# Patient Record
Sex: MALE | Race: WHITE | NOT HISPANIC OR LATINO | Employment: FULL TIME | URBAN - METROPOLITAN AREA
[De-identification: names, ages, dates, MRNs, and addresses within clinical notes are randomized per-mention and may not be internally consistent; named-entity substitution may affect disease eponyms.]

---

## 2018-01-01 ENCOUNTER — APPOINTMENT (OUTPATIENT)
Dept: GENERAL RADIOLOGY | Facility: HOSPITAL | Age: 63
End: 2018-01-01

## 2018-01-01 ENCOUNTER — APPOINTMENT (OUTPATIENT)
Dept: CT IMAGING | Facility: HOSPITAL | Age: 63
End: 2018-01-01

## 2018-01-01 ENCOUNTER — OFFICE VISIT (OUTPATIENT)
Dept: ONCOLOGY | Facility: CLINIC | Age: 63
End: 2018-01-01

## 2018-01-01 ENCOUNTER — APPOINTMENT (OUTPATIENT)
Dept: NUCLEAR MEDICINE | Facility: HOSPITAL | Age: 63
End: 2018-01-01

## 2018-01-01 ENCOUNTER — TELEPHONE (OUTPATIENT)
Dept: ONCOLOGY | Facility: HOSPITAL | Age: 63
End: 2018-01-01

## 2018-01-01 ENCOUNTER — DOCUMENTATION (OUTPATIENT)
Dept: OTHER | Facility: HOSPITAL | Age: 63
End: 2018-01-01

## 2018-01-01 ENCOUNTER — LAB (OUTPATIENT)
Dept: LAB | Facility: HOSPITAL | Age: 63
End: 2018-01-01

## 2018-01-01 ENCOUNTER — TELEPHONE (OUTPATIENT)
Dept: ONCOLOGY | Facility: CLINIC | Age: 63
End: 2018-01-01

## 2018-01-01 ENCOUNTER — HOSPITAL ENCOUNTER (EMERGENCY)
Facility: HOSPITAL | Age: 63
Discharge: HOME | End: 2018-09-21
Attending: EMERGENCY MEDICINE | Admitting: EMERGENCY MEDICINE

## 2018-01-01 ENCOUNTER — APPOINTMENT (OUTPATIENT)
Dept: LAB | Facility: HOSPITAL | Age: 63
End: 2018-01-01

## 2018-01-01 ENCOUNTER — DOCUMENTATION (OUTPATIENT)
Dept: ONCOLOGY | Facility: CLINIC | Age: 63
End: 2018-01-01

## 2018-01-01 ENCOUNTER — DOCUMENTATION (OUTPATIENT)
Dept: RADIATION ONCOLOGY | Facility: HOSPITAL | Age: 63
End: 2018-01-01

## 2018-01-01 ENCOUNTER — NURSE TRIAGE (OUTPATIENT)
Dept: CALL CENTER | Facility: HOSPITAL | Age: 63
End: 2018-01-01

## 2018-01-01 ENCOUNTER — TELEPHONE (OUTPATIENT)
Dept: RADIATION ONCOLOGY | Facility: HOSPITAL | Age: 63
End: 2018-01-01

## 2018-01-01 ENCOUNTER — APPOINTMENT (OUTPATIENT)
Dept: ONCOLOGY | Facility: HOSPITAL | Age: 63
End: 2018-01-01

## 2018-01-01 ENCOUNTER — HOSPITAL ENCOUNTER (EMERGENCY)
Facility: HOSPITAL | Age: 63
Discharge: HOME | End: 2018-09-23
Attending: EMERGENCY MEDICINE | Admitting: EMERGENCY MEDICINE

## 2018-01-01 ENCOUNTER — HOSPITAL ENCOUNTER (INPATIENT)
Facility: HOSPITAL | Age: 63
LOS: 7 days | Discharge: HOSPICE | End: 2018-09-15
Attending: EMERGENCY MEDICINE | Admitting: INTERNAL MEDICINE

## 2018-01-01 ENCOUNTER — TELEPHONE (OUTPATIENT)
Dept: OTHER | Facility: HOSPITAL | Age: 63
End: 2018-01-01

## 2018-01-01 ENCOUNTER — DOCUMENTATION (OUTPATIENT)
Dept: ONCOLOGY | Facility: HOSPITAL | Age: 63
End: 2018-01-01

## 2018-01-01 ENCOUNTER — READMISSION MANAGEMENT (OUTPATIENT)
Dept: CALL CENTER | Facility: HOSPITAL | Age: 63
End: 2018-01-01

## 2018-01-01 ENCOUNTER — APPOINTMENT (OUTPATIENT)
Dept: GENERAL RADIOLOGY | Facility: HOSPITAL | Age: 63
End: 2018-01-01
Attending: RADIOLOGY

## 2018-01-01 ENCOUNTER — RADIATION ONCOLOGY WEEKLY ASSESSMENT (OUTPATIENT)
Dept: RADIATION ONCOLOGY | Facility: HOSPITAL | Age: 63
End: 2018-01-01

## 2018-01-01 ENCOUNTER — APPOINTMENT (OUTPATIENT)
Dept: RESPIRATORY THERAPY | Facility: HOSPITAL | Age: 63
End: 2018-01-01
Attending: INTERNAL MEDICINE

## 2018-01-01 ENCOUNTER — HOSPITAL ENCOUNTER (INPATIENT)
Facility: HOSPITAL | Age: 63
LOS: 4 days | End: 2018-10-03
Attending: EMERGENCY MEDICINE | Admitting: INTERNAL MEDICINE

## 2018-01-01 ENCOUNTER — APPOINTMENT (OUTPATIENT)
Dept: RADIATION ONCOLOGY | Facility: HOSPITAL | Age: 63
End: 2018-01-01

## 2018-01-01 ENCOUNTER — APPOINTMENT (OUTPATIENT)
Dept: ONCOLOGY | Facility: CLINIC | Age: 63
End: 2018-01-01

## 2018-01-01 ENCOUNTER — APPOINTMENT (OUTPATIENT)
Dept: CARDIOLOGY | Facility: HOSPITAL | Age: 63
End: 2018-01-01
Attending: INTERNAL MEDICINE

## 2018-01-01 ENCOUNTER — HOSPITAL ENCOUNTER (INPATIENT)
Facility: HOSPITAL | Age: 63
LOS: 1 days | Discharge: HOME | End: 2018-09-01
Attending: EMERGENCY MEDICINE | Admitting: INTERNAL MEDICINE

## 2018-01-01 ENCOUNTER — APPOINTMENT (OUTPATIENT)
Dept: MRI IMAGING | Facility: HOSPITAL | Age: 63
End: 2018-01-01

## 2018-01-01 VITALS — WEIGHT: 143.96 LBS | BODY MASS INDEX: 23.24 KG/M2

## 2018-01-01 VITALS
DIASTOLIC BLOOD PRESSURE: 70 MMHG | HEIGHT: 66 IN | TEMPERATURE: 98.6 F | BODY MASS INDEX: 21.34 KG/M2 | SYSTOLIC BLOOD PRESSURE: 112 MMHG | WEIGHT: 132.8 LBS | HEART RATE: 106 BPM | OXYGEN SATURATION: 98 % | RESPIRATION RATE: 16 BRPM

## 2018-01-01 VITALS
HEIGHT: 66 IN | WEIGHT: 124 LBS | HEART RATE: 59 BPM | BODY MASS INDEX: 19.93 KG/M2 | TEMPERATURE: 98.7 F | SYSTOLIC BLOOD PRESSURE: 100 MMHG | RESPIRATION RATE: 16 BRPM | DIASTOLIC BLOOD PRESSURE: 60 MMHG | OXYGEN SATURATION: 97 %

## 2018-01-01 VITALS
RESPIRATION RATE: 16 BRPM | DIASTOLIC BLOOD PRESSURE: 60 MMHG | HEART RATE: 101 BPM | BODY MASS INDEX: 21.21 KG/M2 | TEMPERATURE: 97.1 F | HEIGHT: 66 IN | SYSTOLIC BLOOD PRESSURE: 101 MMHG | OXYGEN SATURATION: 97 % | WEIGHT: 132 LBS

## 2018-01-01 VITALS
DIASTOLIC BLOOD PRESSURE: 84 MMHG | WEIGHT: 135 LBS | SYSTOLIC BLOOD PRESSURE: 105 MMHG | TEMPERATURE: 97.4 F | HEIGHT: 67 IN | BODY MASS INDEX: 21.19 KG/M2 | RESPIRATION RATE: 18 BRPM | OXYGEN SATURATION: 97 % | HEART RATE: 84 BPM

## 2018-01-01 VITALS
DIASTOLIC BLOOD PRESSURE: 79 MMHG | HEART RATE: 100 BPM | SYSTOLIC BLOOD PRESSURE: 118 MMHG | TEMPERATURE: 98.6 F | RESPIRATION RATE: 16 BRPM | WEIGHT: 123.4 LBS | HEIGHT: 63 IN | OXYGEN SATURATION: 93 % | BODY MASS INDEX: 21.86 KG/M2

## 2018-01-01 VITALS
WEIGHT: 143.96 LBS | HEIGHT: 66 IN | TEMPERATURE: 97.8 F | SYSTOLIC BLOOD PRESSURE: 139 MMHG | BODY MASS INDEX: 23.14 KG/M2 | RESPIRATION RATE: 18 BRPM | DIASTOLIC BLOOD PRESSURE: 97 MMHG | HEART RATE: 71 BPM | OXYGEN SATURATION: 97 %

## 2018-01-01 VITALS
RESPIRATION RATE: 16 BRPM | HEART RATE: 71 BPM | TEMPERATURE: 100 F | DIASTOLIC BLOOD PRESSURE: 67 MMHG | HEIGHT: 66 IN | SYSTOLIC BLOOD PRESSURE: 107 MMHG | BODY MASS INDEX: 22.31 KG/M2 | OXYGEN SATURATION: 95 % | WEIGHT: 138.8 LBS

## 2018-01-01 DIAGNOSIS — C79.31 SECONDARY CANCER OF BRAIN (HCC): ICD-10-CM

## 2018-01-01 DIAGNOSIS — M25.552 LEFT HIP PAIN: Primary | ICD-10-CM

## 2018-01-01 DIAGNOSIS — I48.3 TYPICAL ATRIAL FLUTTER (HCC): Primary | ICD-10-CM

## 2018-01-01 DIAGNOSIS — C79.51 METASTASIS TO BONE (HCC): ICD-10-CM

## 2018-01-01 DIAGNOSIS — R91.1 LUNG NODULE: Primary | ICD-10-CM

## 2018-01-01 DIAGNOSIS — J95.811 POSTPROCEDURAL PNEUMOTHORAX: ICD-10-CM

## 2018-01-01 DIAGNOSIS — C34.91 NON-SMALL CELL CANCER OF RIGHT LUNG (HCC): Primary | ICD-10-CM

## 2018-01-01 DIAGNOSIS — R31.9 HEMATURIA, UNSPECIFIED TYPE: Primary | ICD-10-CM

## 2018-01-01 DIAGNOSIS — C79.51 LUNG CANCER METASTATIC TO BONE (HCC): Primary | ICD-10-CM

## 2018-01-01 DIAGNOSIS — C79.51 METASTASIS TO BONE (HCC): Primary | ICD-10-CM

## 2018-01-01 DIAGNOSIS — S22.32XA CLOSED FRACTURE OF ONE RIB OF LEFT SIDE, INITIAL ENCOUNTER: ICD-10-CM

## 2018-01-01 DIAGNOSIS — C34.91 NON-SMALL CELL CANCER OF RIGHT LUNG (HCC): ICD-10-CM

## 2018-01-01 DIAGNOSIS — K59.03 DRUG-INDUCED CONSTIPATION: Primary | ICD-10-CM

## 2018-01-01 DIAGNOSIS — J93.9 PNEUMOTHORAX ON RIGHT: Primary | ICD-10-CM

## 2018-01-01 DIAGNOSIS — C34.90 LUNG CANCER METASTATIC TO BONE (HCC): Primary | ICD-10-CM

## 2018-01-01 DIAGNOSIS — I95.9 HYPOTENSION, UNSPECIFIED HYPOTENSION TYPE: ICD-10-CM

## 2018-01-01 DIAGNOSIS — I48.91 ATRIAL FIBRILLATION WITH RVR (HCC): ICD-10-CM

## 2018-01-01 DIAGNOSIS — R10.84 GENERALIZED ABDOMINAL PAIN: Primary | ICD-10-CM

## 2018-01-01 DIAGNOSIS — S22.39XA CLOSED FRACTURE OF ONE RIB, UNSPECIFIED LATERALITY, INITIAL ENCOUNTER: ICD-10-CM

## 2018-01-01 DIAGNOSIS — R93.5 ABNORMAL CT OF THE ABDOMEN: ICD-10-CM

## 2018-01-01 DIAGNOSIS — R91.1 LUNG NODULE: ICD-10-CM

## 2018-01-01 DIAGNOSIS — R91.8 LUNG MASS: ICD-10-CM

## 2018-01-01 LAB
ABO GROUP BLD: NORMAL
ALBUMIN SERPL-MCNC: 2.5 G/DL (ref 3.5–5.2)
ALBUMIN SERPL-MCNC: 2.9 G/DL (ref 3.5–5.2)
ALBUMIN SERPL-MCNC: 3.5 G/DL (ref 3.5–5.2)
ALBUMIN SERPL-MCNC: 3.6 G/DL (ref 3.5–5.2)
ALBUMIN SERPL-MCNC: 4 G/DL (ref 3.5–5.2)
ALBUMIN SERPL-MCNC: 4.2 G/DL (ref 3.5–5.2)
ALBUMIN/GLOB SERPL: 1 G/DL
ALBUMIN/GLOB SERPL: 1 G/DL
ALBUMIN/GLOB SERPL: 1 G/DL (ref 1.1–2.4)
ALBUMIN/GLOB SERPL: 1.1 G/DL
ALP SERPL-CCNC: 106 U/L (ref 39–117)
ALP SERPL-CCNC: 107 U/L (ref 38–116)
ALP SERPL-CCNC: 112 U/L (ref 39–117)
ALP SERPL-CCNC: 113 U/L (ref 39–117)
ALP SERPL-CCNC: 116 U/L (ref 39–117)
ALP SERPL-CCNC: 72 U/L (ref 39–117)
ALT SERPL W P-5'-P-CCNC: 14 U/L (ref 1–41)
ALT SERPL W P-5'-P-CCNC: 19 U/L (ref 1–41)
ALT SERPL W P-5'-P-CCNC: 24 U/L (ref 1–41)
ALT SERPL W P-5'-P-CCNC: 25 U/L (ref 0–41)
ALT SERPL W P-5'-P-CCNC: 25 U/L (ref 1–41)
ALT SERPL W P-5'-P-CCNC: 8 U/L (ref 1–41)
AMPHET+METHAMPHET UR QL: NEGATIVE
ANION GAP SERPL CALCULATED.3IONS-SCNC: 11.5 MMOL/L
ANION GAP SERPL CALCULATED.3IONS-SCNC: 11.9 MMOL/L
ANION GAP SERPL CALCULATED.3IONS-SCNC: 12.1 MMOL/L
ANION GAP SERPL CALCULATED.3IONS-SCNC: 12.4 MMOL/L
ANION GAP SERPL CALCULATED.3IONS-SCNC: 12.4 MMOL/L
ANION GAP SERPL CALCULATED.3IONS-SCNC: 13.2 MMOL/L
ANION GAP SERPL CALCULATED.3IONS-SCNC: 13.4 MMOL/L
ANION GAP SERPL CALCULATED.3IONS-SCNC: 13.6 MMOL/L
ANION GAP SERPL CALCULATED.3IONS-SCNC: 14.4 MMOL/L
ANION GAP SERPL CALCULATED.3IONS-SCNC: 14.9 MMOL/L
ANION GAP SERPL CALCULATED.3IONS-SCNC: 15.2 MMOL/L
ANION GAP SERPL CALCULATED.3IONS-SCNC: 15.8 MMOL/L
ANION GAP SERPL CALCULATED.3IONS-SCNC: 16.3 MMOL/L
ANION GAP SERPL CALCULATED.3IONS-SCNC: 16.5 MMOL/L
ANION GAP SERPL CALCULATED.3IONS-SCNC: 16.9 MMOL/L
ANION GAP SERPL CALCULATED.3IONS-SCNC: 9.3 MMOL/L
APTT PPP: 24.9 SECONDS (ref 22.7–35.4)
APTT PPP: 31.1 SECONDS (ref 22.7–35.4)
APTT PPP: 33.5 SECONDS (ref 22.7–35.4)
AST SERPL-CCNC: 13 U/L (ref 1–40)
AST SERPL-CCNC: 14 U/L (ref 1–40)
AST SERPL-CCNC: 15 U/L (ref 0–40)
AST SERPL-CCNC: 17 U/L (ref 1–40)
AST SERPL-CCNC: 18 U/L (ref 1–40)
AST SERPL-CCNC: 20 U/L (ref 1–40)
BACTERIA UR QL AUTO: ABNORMAL /HPF
BACTERIA UR QL AUTO: ABNORMAL /HPF
BARBITURATES UR QL SCN: NEGATIVE
BASOPHILS # BLD AUTO: 0 10*3/MM3 (ref 0–0.2)
BASOPHILS # BLD AUTO: 0.01 10*3/MM3 (ref 0–0.1)
BASOPHILS # BLD AUTO: 0.01 10*3/MM3 (ref 0–0.2)
BASOPHILS # BLD AUTO: 0.02 10*3/MM3 (ref 0–0.2)
BASOPHILS # BLD AUTO: 0.03 10*3/MM3 (ref 0–0.2)
BASOPHILS # BLD AUTO: 0.03 10*3/MM3 (ref 0–0.2)
BASOPHILS # BLD AUTO: 0.09 10*3/MM3 (ref 0–0.1)
BASOPHILS NFR BLD AUTO: 0 % (ref 0–1.1)
BASOPHILS NFR BLD AUTO: 0 % (ref 0–1.5)
BASOPHILS NFR BLD AUTO: 0.1 % (ref 0–1.5)
BASOPHILS NFR BLD AUTO: 0.2 % (ref 0–1.5)
BASOPHILS NFR BLD AUTO: 0.3 % (ref 0–1.1)
BENZODIAZ UR QL SCN: NEGATIVE
BH CV ECHO MEAS - ACS: 2.4 CM
BH CV ECHO MEAS - AO MAX PG (FULL): 1.1 MMHG
BH CV ECHO MEAS - AO MAX PG: 3.6 MMHG
BH CV ECHO MEAS - AO MEAN PG (FULL): 1 MMHG
BH CV ECHO MEAS - AO MEAN PG: 2 MMHG
BH CV ECHO MEAS - AO ROOT AREA (BSA CORRECTED): 2
BH CV ECHO MEAS - AO ROOT AREA: 9.1 CM^2
BH CV ECHO MEAS - AO ROOT DIAM: 3.4 CM
BH CV ECHO MEAS - AO V2 MAX: 94.7 CM/SEC
BH CV ECHO MEAS - AO V2 MEAN: 61.7 CM/SEC
BH CV ECHO MEAS - AO V2 VTI: 16.1 CM
BH CV ECHO MEAS - AVA(I,A): 2.5 CM^2
BH CV ECHO MEAS - AVA(I,D): 2.5 CM^2
BH CV ECHO MEAS - AVA(V,A): 2.6 CM^2
BH CV ECHO MEAS - AVA(V,D): 2.6 CM^2
BH CV ECHO MEAS - BSA(HAYCOCK): 1.7 M^2
BH CV ECHO MEAS - BSA: 1.7 M^2
BH CV ECHO MEAS - BZI_BMI: 23.1 KILOGRAMS/M^2
BH CV ECHO MEAS - BZI_METRIC_HEIGHT: 167.6 CM
BH CV ECHO MEAS - BZI_METRIC_WEIGHT: 64.9 KG
BH CV ECHO MEAS - EDV(CUBED): 117.6 ML
BH CV ECHO MEAS - EDV(MOD-SP2): 59 ML
BH CV ECHO MEAS - EDV(MOD-SP4): 62 ML
BH CV ECHO MEAS - EDV(TEICH): 112.8 ML
BH CV ECHO MEAS - EF(CUBED): 63.6 %
BH CV ECHO MEAS - EF(MOD-BP): 64 %
BH CV ECHO MEAS - EF(MOD-SP2): 62.7 %
BH CV ECHO MEAS - EF(MOD-SP4): 61.3 %
BH CV ECHO MEAS - EF(TEICH): 54.9 %
BH CV ECHO MEAS - ESV(CUBED): 42.9 ML
BH CV ECHO MEAS - ESV(MOD-SP2): 22 ML
BH CV ECHO MEAS - ESV(MOD-SP4): 24 ML
BH CV ECHO MEAS - ESV(TEICH): 50.9 ML
BH CV ECHO MEAS - FS: 28.6 %
BH CV ECHO MEAS - IVS/LVPW: 1
BH CV ECHO MEAS - IVSD: 0.8 CM
BH CV ECHO MEAS - LAT PEAK E' VEL: 9 CM/SEC
BH CV ECHO MEAS - LV DIASTOLIC VOL/BSA (35-75): 35.7 ML/M^2
BH CV ECHO MEAS - LV MASS(C)D: 131.2 GRAMS
BH CV ECHO MEAS - LV MASS(C)DI: 75.6 GRAMS/M^2
BH CV ECHO MEAS - LV MAX PG: 2.5 MMHG
BH CV ECHO MEAS - LV MEAN PG: 1 MMHG
BH CV ECHO MEAS - LV SYSTOLIC VOL/BSA (12-30): 13.8 ML/M^2
BH CV ECHO MEAS - LV V1 MAX: 79.2 CM/SEC
BH CV ECHO MEAS - LV V1 MEAN: 49.9 CM/SEC
BH CV ECHO MEAS - LV V1 VTI: 13 CM
BH CV ECHO MEAS - LVIDD: 4.9 CM
BH CV ECHO MEAS - LVIDS: 3.5 CM
BH CV ECHO MEAS - LVLD AP2: 8.7 CM
BH CV ECHO MEAS - LVLD AP4: 7.7 CM
BH CV ECHO MEAS - LVLS AP2: 7.3 CM
BH CV ECHO MEAS - LVLS AP4: 6.9 CM
BH CV ECHO MEAS - LVOT AREA (M): 3.1 CM^2
BH CV ECHO MEAS - LVOT AREA: 3.1 CM^2
BH CV ECHO MEAS - LVOT DIAM: 2 CM
BH CV ECHO MEAS - LVPWD: 0.8 CM
BH CV ECHO MEAS - MED PEAK E' VEL: 9 CM/SEC
BH CV ECHO MEAS - MV A DUR: 0.14 SEC
BH CV ECHO MEAS - MV A MAX VEL: 75.6 CM/SEC
BH CV ECHO MEAS - MV DEC SLOPE: 226.5 CM/SEC^2
BH CV ECHO MEAS - MV DEC TIME: 0.26 SEC
BH CV ECHO MEAS - MV E MAX VEL: 55.3 CM/SEC
BH CV ECHO MEAS - MV E/A: 0.73
BH CV ECHO MEAS - MV MAX PG: 4.1 MMHG
BH CV ECHO MEAS - MV MEAN PG: 2 MMHG
BH CV ECHO MEAS - MV P1/2T MAX VEL: 98.5 CM/SEC
BH CV ECHO MEAS - MV P1/2T: 127.4 MSEC
BH CV ECHO MEAS - MV V2 MAX: 101 CM/SEC
BH CV ECHO MEAS - MV V2 MEAN: 63.8 CM/SEC
BH CV ECHO MEAS - MV V2 VTI: 30.4 CM
BH CV ECHO MEAS - MVA P1/2T LCG: 2.2 CM^2
BH CV ECHO MEAS - MVA(P1/2T): 1.7 CM^2
BH CV ECHO MEAS - MVA(VTI): 1.3 CM^2
BH CV ECHO MEAS - PA ACC TIME: 0.1 SEC
BH CV ECHO MEAS - PA MAX PG (FULL): 2.6 MMHG
BH CV ECHO MEAS - PA MAX PG: 4.8 MMHG
BH CV ECHO MEAS - PA PR(ACCEL): 34.5 MMHG
BH CV ECHO MEAS - PA V2 MAX: 109 CM/SEC
BH CV ECHO MEAS - RAP SYSTOLE: 3 MMHG
BH CV ECHO MEAS - RV MAX PG: 2.1 MMHG
BH CV ECHO MEAS - RV MEAN PG: 1 MMHG
BH CV ECHO MEAS - RV V1 MAX: 72.5 CM/SEC
BH CV ECHO MEAS - RV V1 MEAN: 47.2 CM/SEC
BH CV ECHO MEAS - RV V1 VTI: 12.4 CM
BH CV ECHO MEAS - RVSP: 35 MMHG
BH CV ECHO MEAS - SI(AO): 84.3 ML/M^2
BH CV ECHO MEAS - SI(CUBED): 43.1 ML/M^2
BH CV ECHO MEAS - SI(LVOT): 23.5 ML/M^2
BH CV ECHO MEAS - SI(MOD-SP2): 21.3 ML/M^2
BH CV ECHO MEAS - SI(MOD-SP4): 21.9 ML/M^2
BH CV ECHO MEAS - SI(TEICH): 35.7 ML/M^2
BH CV ECHO MEAS - SV(AO): 146.2 ML
BH CV ECHO MEAS - SV(CUBED): 74.8 ML
BH CV ECHO MEAS - SV(LVOT): 40.8 ML
BH CV ECHO MEAS - SV(MOD-SP2): 37 ML
BH CV ECHO MEAS - SV(MOD-SP4): 38 ML
BH CV ECHO MEAS - SV(TEICH): 61.9 ML
BH CV ECHO MEAS - TAPSE (>1.6): 2.5 CM2
BH CV ECHO MEAS - TR MAX VEL: 281 CM/SEC
BH CV ECHO MEASUREMENTS AVERAGE E/E' RATIO: 6.14
BH CV VAS BP RIGHT ARM: NORMAL MMHG
BH CV XLRA - RV BASE: 2.5 CM
BH CV XLRA - TDI S': 10 CM/SEC
BILIRUB SERPL-MCNC: 0.5 MG/DL (ref 0.1–1.2)
BILIRUB SERPL-MCNC: 0.7 MG/DL (ref 0.1–1.2)
BILIRUB SERPL-MCNC: 0.9 MG/DL (ref 0.1–1.2)
BILIRUB SERPL-MCNC: 1.7 MG/DL (ref 0.1–1.2)
BILIRUB UR QL STRIP: NEGATIVE
BILIRUB UR QL STRIP: NEGATIVE
BLD GP AB SCN SERPL QL: NEGATIVE
BUN BLD-MCNC: 20 MG/DL (ref 8–23)
BUN BLD-MCNC: 22 MG/DL (ref 8–23)
BUN BLD-MCNC: 23 MG/DL (ref 8–23)
BUN BLD-MCNC: 24 MG/DL (ref 8–23)
BUN BLD-MCNC: 26 MG/DL (ref 8–23)
BUN BLD-MCNC: 27 MG/DL (ref 8–23)
BUN BLD-MCNC: 27 MG/DL (ref 8–23)
BUN BLD-MCNC: 31 MG/DL (ref 8–23)
BUN BLD-MCNC: 31 MG/DL (ref 8–23)
BUN BLD-MCNC: 35 MG/DL (ref 6–20)
BUN BLD-MCNC: 37 MG/DL (ref 8–23)
BUN BLD-MCNC: 39 MG/DL (ref 8–23)
BUN/CREAT SERPL: 21.1 (ref 7–25)
BUN/CREAT SERPL: 26.1 (ref 7–25)
BUN/CREAT SERPL: 26.2 (ref 7–25)
BUN/CREAT SERPL: 29.7 (ref 7–25)
BUN/CREAT SERPL: 30.7 (ref 7–25)
BUN/CREAT SERPL: 31.4 (ref 7–25)
BUN/CREAT SERPL: 31.9 (ref 7–25)
BUN/CREAT SERPL: 35.2 (ref 7–25)
BUN/CREAT SERPL: 36.1 (ref 7–25)
BUN/CREAT SERPL: 36.4 (ref 7–25)
BUN/CREAT SERPL: 37.9 (ref 7–25)
BUN/CREAT SERPL: 45.6 (ref 7–25)
BUN/CREAT SERPL: 48.8 (ref 7–25)
BUN/CREAT SERPL: 56.9 (ref 7–25)
BUN/CREAT SERPL: 57.5 (ref 7–25)
BUN/CREAT SERPL: 59.3 (ref 7.3–30)
CALCIUM SPEC-SCNC: 10.1 MG/DL (ref 8.6–10.5)
CALCIUM SPEC-SCNC: 10.2 MG/DL (ref 8.6–10.5)
CALCIUM SPEC-SCNC: 7.8 MG/DL (ref 8.6–10.5)
CALCIUM SPEC-SCNC: 8.2 MG/DL (ref 8.6–10.5)
CALCIUM SPEC-SCNC: 8.3 MG/DL (ref 8.6–10.5)
CALCIUM SPEC-SCNC: 8.5 MG/DL (ref 8.6–10.5)
CALCIUM SPEC-SCNC: 8.6 MG/DL (ref 8.6–10.5)
CALCIUM SPEC-SCNC: 8.7 MG/DL (ref 8.6–10.5)
CALCIUM SPEC-SCNC: 8.8 MG/DL (ref 8.5–10.2)
CALCIUM SPEC-SCNC: 8.9 MG/DL (ref 8.6–10.5)
CALCIUM SPEC-SCNC: 9.2 MG/DL (ref 8.6–10.5)
CALCIUM SPEC-SCNC: 9.6 MG/DL (ref 8.6–10.5)
CALCIUM SPEC-SCNC: 9.7 MG/DL (ref 8.6–10.5)
CANNABINOIDS SERPL QL: NEGATIVE
CHLORIDE SERPL-SCNC: 101 MMOL/L (ref 98–107)
CHLORIDE SERPL-SCNC: 103 MMOL/L (ref 98–107)
CHLORIDE SERPL-SCNC: 103 MMOL/L (ref 98–107)
CHLORIDE SERPL-SCNC: 91 MMOL/L (ref 98–107)
CHLORIDE SERPL-SCNC: 92 MMOL/L (ref 98–107)
CHLORIDE SERPL-SCNC: 93 MMOL/L (ref 98–107)
CHLORIDE SERPL-SCNC: 95 MMOL/L (ref 98–107)
CHLORIDE SERPL-SCNC: 96 MMOL/L (ref 98–107)
CHLORIDE SERPL-SCNC: 97 MMOL/L (ref 98–107)
CHLORIDE SERPL-SCNC: 97 MMOL/L (ref 98–107)
CHLORIDE SERPL-SCNC: 99 MMOL/L (ref 98–107)
CLARITY UR: ABNORMAL
CLARITY UR: CLEAR
CO2 SERPL-SCNC: 19.8 MMOL/L (ref 22–29)
CO2 SERPL-SCNC: 20.8 MMOL/L (ref 22–29)
CO2 SERPL-SCNC: 20.9 MMOL/L (ref 22–29)
CO2 SERPL-SCNC: 23.4 MMOL/L (ref 22–29)
CO2 SERPL-SCNC: 23.6 MMOL/L (ref 22–29)
CO2 SERPL-SCNC: 23.7 MMOL/L (ref 22–29)
CO2 SERPL-SCNC: 24.1 MMOL/L (ref 22–29)
CO2 SERPL-SCNC: 24.5 MMOL/L (ref 22–29)
CO2 SERPL-SCNC: 25.1 MMOL/L (ref 22–29)
CO2 SERPL-SCNC: 25.5 MMOL/L (ref 22–29)
CO2 SERPL-SCNC: 26.1 MMOL/L (ref 22–29)
CO2 SERPL-SCNC: 26.6 MMOL/L (ref 22–29)
CO2 SERPL-SCNC: 26.6 MMOL/L (ref 22–29)
CO2 SERPL-SCNC: 26.7 MMOL/L (ref 22–29)
CO2 SERPL-SCNC: 27.2 MMOL/L (ref 22–29)
CO2 SERPL-SCNC: 27.6 MMOL/L (ref 22–29)
COCAINE UR QL: NEGATIVE
COLOR UR: ABNORMAL
COLOR UR: ABNORMAL
CREAT BLD-MCNC: 0.4 MG/DL (ref 0.76–1.27)
CREAT BLD-MCNC: 0.55 MG/DL (ref 0.76–1.27)
CREAT BLD-MCNC: 0.58 MG/DL (ref 0.76–1.27)
CREAT BLD-MCNC: 0.59 MG/DL (ref 0.7–1.3)
CREAT BLD-MCNC: 0.65 MG/DL (ref 0.76–1.27)
CREAT BLD-MCNC: 0.68 MG/DL (ref 0.76–1.27)
CREAT BLD-MCNC: 0.7 MG/DL (ref 0.76–1.27)
CREAT BLD-MCNC: 0.72 MG/DL (ref 0.76–1.27)
CREAT BLD-MCNC: 0.72 MG/DL (ref 0.76–1.27)
CREAT BLD-MCNC: 0.8 MG/DL (ref 0.76–1.27)
CREAT BLD-MCNC: 0.84 MG/DL (ref 0.76–1.27)
CREAT BLD-MCNC: 0.88 MG/DL (ref 0.76–1.27)
CREAT BLD-MCNC: 0.91 MG/DL (ref 0.76–1.27)
CREAT BLD-MCNC: 1.14 MG/DL (ref 0.76–1.27)
CYTO UR: NORMAL
D-LACTATE SERPL-SCNC: 0.7 MMOL/L (ref 0.5–2)
D-LACTATE SERPL-SCNC: 2.2 MMOL/L (ref 0.5–2)
DEPRECATED RDW RBC AUTO: 37.5 FL (ref 37–49)
DEPRECATED RDW RBC AUTO: 38 FL (ref 37–49)
DEPRECATED RDW RBC AUTO: 38.9 FL (ref 37–54)
DEPRECATED RDW RBC AUTO: 39.4 FL (ref 37–54)
DEPRECATED RDW RBC AUTO: 39.5 FL (ref 37–54)
DEPRECATED RDW RBC AUTO: 39.5 FL (ref 37–54)
DEPRECATED RDW RBC AUTO: 39.6 FL (ref 37–54)
DEPRECATED RDW RBC AUTO: 39.8 FL (ref 37–54)
DEPRECATED RDW RBC AUTO: 39.8 FL (ref 37–54)
DEPRECATED RDW RBC AUTO: 40.1 FL (ref 37–54)
DEPRECATED RDW RBC AUTO: 40.6 FL (ref 37–54)
DEPRECATED RDW RBC AUTO: 41.2 FL (ref 37–54)
DEPRECATED RDW RBC AUTO: 41.2 FL (ref 37–54)
DEPRECATED RDW RBC AUTO: 41.9 FL (ref 37–54)
DEPRECATED RDW RBC AUTO: 42.6 FL (ref 37–54)
DEPRECATED RDW RBC AUTO: 44.8 FL (ref 37–54)
EOSINOPHIL # BLD AUTO: 0 10*3/MM3 (ref 0–0.7)
EOSINOPHIL # BLD AUTO: 0.01 10*3/MM3 (ref 0–0.36)
EOSINOPHIL # BLD AUTO: 0.01 10*3/MM3 (ref 0–0.7)
EOSINOPHIL # BLD AUTO: 0.02 10*3/MM3 (ref 0–0.7)
EOSINOPHIL # BLD AUTO: 0.02 10*3/MM3 (ref 0–0.7)
EOSINOPHIL # BLD AUTO: 0.03 10*3/MM3 (ref 0–0.7)
EOSINOPHIL # BLD AUTO: 0.04 10*3/MM3 (ref 0–0.7)
EOSINOPHIL # BLD AUTO: 0.14 10*3/MM3 (ref 0–0.36)
EOSINOPHIL NFR BLD AUTO: 0 % (ref 0.3–6.2)
EOSINOPHIL NFR BLD AUTO: 0 % (ref 1–5)
EOSINOPHIL NFR BLD AUTO: 0.1 % (ref 0.3–6.2)
EOSINOPHIL NFR BLD AUTO: 0.1 % (ref 0.3–6.2)
EOSINOPHIL NFR BLD AUTO: 0.2 % (ref 0.3–6.2)
EOSINOPHIL NFR BLD AUTO: 0.2 % (ref 0.3–6.2)
EOSINOPHIL NFR BLD AUTO: 0.4 % (ref 0.3–6.2)
EOSINOPHIL NFR BLD AUTO: 0.5 % (ref 1–5)
ERYTHROCYTE [DISTWIDTH] IN BLOOD BY AUTOMATED COUNT: 11.6 % (ref 11.5–14.5)
ERYTHROCYTE [DISTWIDTH] IN BLOOD BY AUTOMATED COUNT: 11.6 % (ref 11.5–14.5)
ERYTHROCYTE [DISTWIDTH] IN BLOOD BY AUTOMATED COUNT: 11.7 % (ref 11.5–14.5)
ERYTHROCYTE [DISTWIDTH] IN BLOOD BY AUTOMATED COUNT: 11.7 % (ref 11.7–14.5)
ERYTHROCYTE [DISTWIDTH] IN BLOOD BY AUTOMATED COUNT: 11.8 % (ref 11.5–14.5)
ERYTHROCYTE [DISTWIDTH] IN BLOOD BY AUTOMATED COUNT: 11.8 % (ref 11.5–14.5)
ERYTHROCYTE [DISTWIDTH] IN BLOOD BY AUTOMATED COUNT: 11.9 % (ref 11.5–14.5)
ERYTHROCYTE [DISTWIDTH] IN BLOOD BY AUTOMATED COUNT: 11.9 % (ref 11.5–14.5)
ERYTHROCYTE [DISTWIDTH] IN BLOOD BY AUTOMATED COUNT: 12 % (ref 11.5–14.5)
ERYTHROCYTE [DISTWIDTH] IN BLOOD BY AUTOMATED COUNT: 12 % (ref 11.7–14.5)
ERYTHROCYTE [DISTWIDTH] IN BLOOD BY AUTOMATED COUNT: 12.3 % (ref 11.5–14.5)
ERYTHROCYTE [DISTWIDTH] IN BLOOD BY AUTOMATED COUNT: 12.7 % (ref 11.5–14.5)
ERYTHROCYTE [DISTWIDTH] IN BLOOD BY AUTOMATED COUNT: 13 % (ref 11.5–14.5)
ERYTHROCYTE [DISTWIDTH] IN BLOOD BY AUTOMATED COUNT: 13.3 % (ref 11.5–14.5)
ETHANOL BLD-MCNC: <10 MG/DL (ref 0–10)
ETHANOL UR QL: <0.01 %
FERRITIN SERPL-MCNC: 430.7 NG/ML (ref 30–400)
FOLATE SERPL-MCNC: 7.34 NG/ML (ref 4.78–24.2)
GFR SERPL CREATININE-BSD FRML MDRD: 110 ML/MIN/1.73
GFR SERPL CREATININE-BSD FRML MDRD: 110 ML/MIN/1.73
GFR SERPL CREATININE-BSD FRML MDRD: 114 ML/MIN/1.73
GFR SERPL CREATININE-BSD FRML MDRD: 118 ML/MIN/1.73
GFR SERPL CREATININE-BSD FRML MDRD: 124 ML/MIN/1.73
GFR SERPL CREATININE-BSD FRML MDRD: 139 ML/MIN/1.73
GFR SERPL CREATININE-BSD FRML MDRD: 142 ML/MIN/1.73
GFR SERPL CREATININE-BSD FRML MDRD: 150 ML/MIN/1.73
GFR SERPL CREATININE-BSD FRML MDRD: 65 ML/MIN/1.73
GFR SERPL CREATININE-BSD FRML MDRD: 84 ML/MIN/1.73
GFR SERPL CREATININE-BSD FRML MDRD: 87 ML/MIN/1.73
GFR SERPL CREATININE-BSD FRML MDRD: 92 ML/MIN/1.73
GFR SERPL CREATININE-BSD FRML MDRD: 98 ML/MIN/1.73
GFR SERPL CREATININE-BSD FRML MDRD: >150 ML/MIN/1.73
GLOBULIN UR ELPH-MCNC: 2.6 GM/DL
GLOBULIN UR ELPH-MCNC: 2.8 GM/DL
GLOBULIN UR ELPH-MCNC: 3.3 GM/DL
GLOBULIN UR ELPH-MCNC: 3.4 GM/DL (ref 1.8–3.5)
GLOBULIN UR ELPH-MCNC: 3.7 GM/DL
GLOBULIN UR ELPH-MCNC: 3.9 GM/DL
GLUCOSE BLD-MCNC: 103 MG/DL (ref 65–99)
GLUCOSE BLD-MCNC: 106 MG/DL (ref 65–99)
GLUCOSE BLD-MCNC: 107 MG/DL (ref 65–99)
GLUCOSE BLD-MCNC: 107 MG/DL (ref 65–99)
GLUCOSE BLD-MCNC: 110 MG/DL (ref 65–99)
GLUCOSE BLD-MCNC: 120 MG/DL (ref 65–99)
GLUCOSE BLD-MCNC: 121 MG/DL (ref 65–99)
GLUCOSE BLD-MCNC: 122 MG/DL (ref 65–99)
GLUCOSE BLD-MCNC: 131 MG/DL (ref 65–99)
GLUCOSE BLD-MCNC: 131 MG/DL (ref 74–124)
GLUCOSE BLD-MCNC: 134 MG/DL (ref 65–99)
GLUCOSE BLD-MCNC: 135 MG/DL (ref 65–99)
GLUCOSE BLD-MCNC: 137 MG/DL (ref 65–99)
GLUCOSE BLD-MCNC: 144 MG/DL (ref 65–99)
GLUCOSE BLD-MCNC: 148 MG/DL (ref 65–99)
GLUCOSE BLD-MCNC: 92 MG/DL (ref 65–99)
GLUCOSE BLDC GLUCOMTR-MCNC: 128 MG/DL (ref 70–130)
GLUCOSE BLDC GLUCOMTR-MCNC: 132 MG/DL (ref 70–130)
GLUCOSE BLDC GLUCOMTR-MCNC: 134 MG/DL (ref 70–130)
GLUCOSE BLDC GLUCOMTR-MCNC: 134 MG/DL (ref 70–130)
GLUCOSE BLDC GLUCOMTR-MCNC: 136 MG/DL (ref 70–130)
GLUCOSE BLDC GLUCOMTR-MCNC: 139 MG/DL (ref 70–130)
GLUCOSE BLDC GLUCOMTR-MCNC: 141 MG/DL (ref 70–130)
GLUCOSE BLDC GLUCOMTR-MCNC: 142 MG/DL (ref 70–130)
GLUCOSE BLDC GLUCOMTR-MCNC: 142 MG/DL (ref 70–130)
GLUCOSE BLDC GLUCOMTR-MCNC: 143 MG/DL (ref 70–130)
GLUCOSE BLDC GLUCOMTR-MCNC: 144 MG/DL (ref 70–130)
GLUCOSE BLDC GLUCOMTR-MCNC: 146 MG/DL (ref 70–130)
GLUCOSE BLDC GLUCOMTR-MCNC: 152 MG/DL (ref 70–130)
GLUCOSE BLDC GLUCOMTR-MCNC: 158 MG/DL (ref 70–130)
GLUCOSE BLDC GLUCOMTR-MCNC: 161 MG/DL (ref 70–130)
GLUCOSE BLDC GLUCOMTR-MCNC: 172 MG/DL (ref 70–130)
GLUCOSE BLDC GLUCOMTR-MCNC: 195 MG/DL (ref 70–130)
GLUCOSE UR STRIP-MCNC: NEGATIVE MG/DL
GLUCOSE UR STRIP-MCNC: NEGATIVE MG/DL
HCT VFR BLD AUTO: 37.7 % (ref 40.4–52.2)
HCT VFR BLD AUTO: 37.8 % (ref 40.4–52.2)
HCT VFR BLD AUTO: 37.8 % (ref 40.4–52.2)
HCT VFR BLD AUTO: 38.3 % (ref 40.4–52.2)
HCT VFR BLD AUTO: 40.1 % (ref 40.4–52.2)
HCT VFR BLD AUTO: 40.3 % (ref 40.4–52.2)
HCT VFR BLD AUTO: 40.4 % (ref 40.4–52.2)
HCT VFR BLD AUTO: 40.5 % (ref 40.4–52.2)
HCT VFR BLD AUTO: 41.1 % (ref 40.4–52.2)
HCT VFR BLD AUTO: 41.2 % (ref 40.4–52.2)
HCT VFR BLD AUTO: 41.2 % (ref 40.4–52.2)
HCT VFR BLD AUTO: 41.3 % (ref 40.4–52.2)
HCT VFR BLD AUTO: 41.5 % (ref 40–49)
HCT VFR BLD AUTO: 42.8 % (ref 40.4–52.2)
HCT VFR BLD AUTO: 42.9 % (ref 40–49)
HCT VFR BLD AUTO: 47.8 % (ref 40.4–52.2)
HGB BLD-MCNC: 12 G/DL (ref 13.7–17.6)
HGB BLD-MCNC: 12.1 G/DL (ref 13.7–17.6)
HGB BLD-MCNC: 12.4 G/DL (ref 13.7–17.6)
HGB BLD-MCNC: 12.6 G/DL (ref 13.7–17.6)
HGB BLD-MCNC: 13 G/DL (ref 13.7–17.6)
HGB BLD-MCNC: 13.1 G/DL (ref 13.7–17.6)
HGB BLD-MCNC: 13.4 G/DL (ref 13.7–17.6)
HGB BLD-MCNC: 13.5 G/DL (ref 13.7–17.6)
HGB BLD-MCNC: 13.5 G/DL (ref 13.7–17.6)
HGB BLD-MCNC: 13.6 G/DL (ref 13.7–17.6)
HGB BLD-MCNC: 14.2 G/DL (ref 13.7–17.6)
HGB BLD-MCNC: 14.6 G/DL (ref 13.5–16.5)
HGB BLD-MCNC: 14.8 G/DL (ref 13.5–16.5)
HGB BLD-MCNC: 15.5 G/DL (ref 13.7–17.6)
HGB UR QL STRIP.AUTO: ABNORMAL
HGB UR QL STRIP.AUTO: NEGATIVE
HOLD SPECIMEN: NORMAL
HYALINE CASTS UR QL AUTO: ABNORMAL /LPF
HYALINE CASTS UR QL AUTO: ABNORMAL /LPF
IMM GRANULOCYTES # BLD: 0 10*3/MM3 (ref 0–0.03)
IMM GRANULOCYTES # BLD: 0.02 10*3/MM3 (ref 0–0.03)
IMM GRANULOCYTES # BLD: 0.02 10*3/MM3 (ref 0–0.03)
IMM GRANULOCYTES # BLD: 0.03 10*3/MM3 (ref 0–0.03)
IMM GRANULOCYTES # BLD: 0.05 10*3/MM3 (ref 0–0.03)
IMM GRANULOCYTES # BLD: 0.06 10*3/MM3 (ref 0–0.03)
IMM GRANULOCYTES # BLD: 0.06 10*3/MM3 (ref 0–0.03)
IMM GRANULOCYTES # BLD: 0.07 10*3/MM3 (ref 0–0.03)
IMM GRANULOCYTES # BLD: 0.08 10*3/MM3 (ref 0–0.03)
IMM GRANULOCYTES # BLD: 0.08 10*3/MM3 (ref 0–0.03)
IMM GRANULOCYTES # BLD: 0.22 10*3/MM3 (ref 0–0.03)
IMM GRANULOCYTES # BLD: 0.22 10*3/MM3 (ref 0–0.03)
IMM GRANULOCYTES # BLD: 1.35 10*3/MM3 (ref 0–0.03)
IMM GRANULOCYTES NFR BLD: 0 % (ref 0–0.5)
IMM GRANULOCYTES NFR BLD: 0.2 % (ref 0–0.5)
IMM GRANULOCYTES NFR BLD: 0.3 % (ref 0–0.5)
IMM GRANULOCYTES NFR BLD: 0.4 % (ref 0–0.5)
IMM GRANULOCYTES NFR BLD: 0.4 % (ref 0–0.5)
IMM GRANULOCYTES NFR BLD: 0.9 % (ref 0–0.5)
IMM GRANULOCYTES NFR BLD: 1 % (ref 0–0.5)
IMM GRANULOCYTES NFR BLD: 5 % (ref 0–0.5)
INR PPP: 1.07 (ref 0.9–1.1)
INR PPP: 1.08 (ref 0.9–1.1)
INR PPP: 1.1 (ref 0.9–1.1)
INR PPP: 1.11 (ref 0.9–1.1)
INR PPP: 1.17 (ref 0.9–1.1)
IRON 24H UR-MRATE: 53 MCG/DL (ref 59–158)
IRON SATN MFR SERPL: 20 % (ref 20–50)
KETONES UR QL STRIP: ABNORMAL
KETONES UR QL STRIP: NEGATIVE
LAB AP CASE REPORT: NORMAL
LAB AP CLINICAL INFORMATION: NORMAL
LAB AP INTRADEPARTMENTAL CONSULT: NORMAL
LEFT ATRIUM VOLUME INDEX: 20 ML/M2
LEUKOCYTE ESTERASE UR QL STRIP.AUTO: ABNORMAL
LEUKOCYTE ESTERASE UR QL STRIP.AUTO: ABNORMAL
LIPASE SERPL-CCNC: 100 U/L (ref 13–60)
LIPASE SERPL-CCNC: 182 U/L (ref 13–60)
LYMPHOCYTES # BLD AUTO: 0.32 10*3/MM3 (ref 0.9–4.8)
LYMPHOCYTES # BLD AUTO: 0.42 10*3/MM3 (ref 0.9–4.8)
LYMPHOCYTES # BLD AUTO: 0.56 10*3/MM3 (ref 1–3.5)
LYMPHOCYTES # BLD AUTO: 0.58 10*3/MM3 (ref 0.9–4.8)
LYMPHOCYTES # BLD AUTO: 0.58 10*3/MM3 (ref 0.9–4.8)
LYMPHOCYTES # BLD AUTO: 0.69 10*3/MM3 (ref 0.9–4.8)
LYMPHOCYTES # BLD AUTO: 0.94 10*3/MM3 (ref 0.9–4.8)
LYMPHOCYTES # BLD AUTO: 0.99 10*3/MM3 (ref 1–3.5)
LYMPHOCYTES # BLD AUTO: 1.09 10*3/MM3 (ref 0.9–4.8)
LYMPHOCYTES # BLD AUTO: 1.12 10*3/MM3 (ref 0.9–4.8)
LYMPHOCYTES # BLD AUTO: 1.19 10*3/MM3 (ref 0.9–4.8)
LYMPHOCYTES # BLD AUTO: 1.68 10*3/MM3 (ref 0.9–4.8)
LYMPHOCYTES # BLD AUTO: 2.11 10*3/MM3 (ref 0.9–4.8)
LYMPHOCYTES # BLD AUTO: 2.45 10*3/MM3 (ref 0.9–4.8)
LYMPHOCYTES # BLD AUTO: 2.7 10*3/MM3 (ref 0.9–4.8)
LYMPHOCYTES NFR BLD AUTO: 1.8 % (ref 19.6–45.3)
LYMPHOCYTES NFR BLD AUTO: 10.1 % (ref 19.6–45.3)
LYMPHOCYTES NFR BLD AUTO: 10.8 % (ref 19.6–45.3)
LYMPHOCYTES NFR BLD AUTO: 12.5 % (ref 19.6–45.3)
LYMPHOCYTES NFR BLD AUTO: 16.4 % (ref 19.6–45.3)
LYMPHOCYTES NFR BLD AUTO: 2 % (ref 19.6–45.3)
LYMPHOCYTES NFR BLD AUTO: 2.1 % (ref 20–49)
LYMPHOCYTES NFR BLD AUTO: 2.3 % (ref 19.6–45.3)
LYMPHOCYTES NFR BLD AUTO: 21.7 % (ref 19.6–45.3)
LYMPHOCYTES NFR BLD AUTO: 3.8 % (ref 19.6–45.3)
LYMPHOCYTES NFR BLD AUTO: 4.5 % (ref 20–49)
LYMPHOCYTES NFR BLD AUTO: 5.4 % (ref 19.6–45.3)
LYMPHOCYTES NFR BLD AUTO: 5.6 % (ref 19.6–45.3)
LYMPHOCYTES NFR BLD AUTO: 6.6 % (ref 19.6–45.3)
LYMPHOCYTES NFR BLD AUTO: 9.9 % (ref 19.6–45.3)
Lab: NORMAL
Lab: NORMAL
MAGNESIUM SERPL-MCNC: 2.1 MG/DL (ref 1.6–2.4)
MAXIMAL PREDICTED HEART RATE: 157 BPM
MCH RBC QN AUTO: 29.4 PG (ref 27–32.7)
MCH RBC QN AUTO: 29.7 PG (ref 27–32.7)
MCH RBC QN AUTO: 29.9 PG (ref 27–32.7)
MCH RBC QN AUTO: 30 PG (ref 27–32.7)
MCH RBC QN AUTO: 30 PG (ref 27–32.7)
MCH RBC QN AUTO: 30.2 PG (ref 27–32.7)
MCH RBC QN AUTO: 30.3 PG (ref 27–32.7)
MCH RBC QN AUTO: 30.3 PG (ref 27–33)
MCH RBC QN AUTO: 30.5 PG (ref 27–32.7)
MCH RBC QN AUTO: 30.7 PG (ref 27–32.7)
MCH RBC QN AUTO: 30.8 PG (ref 27–32.7)
MCH RBC QN AUTO: 30.9 PG (ref 27–32.7)
MCH RBC QN AUTO: 31.2 PG (ref 27–33)
MCH RBC QN AUTO: 31.5 PG (ref 27–32.7)
MCHC RBC AUTO-ENTMCNC: 31.7 G/DL (ref 32.6–36.4)
MCHC RBC AUTO-ENTMCNC: 31.7 G/DL (ref 32.6–36.4)
MCHC RBC AUTO-ENTMCNC: 32 G/DL (ref 32.6–36.4)
MCHC RBC AUTO-ENTMCNC: 32.1 G/DL (ref 32.6–36.4)
MCHC RBC AUTO-ENTMCNC: 32.4 G/DL (ref 32.6–36.4)
MCHC RBC AUTO-ENTMCNC: 32.6 G/DL (ref 32.6–36.4)
MCHC RBC AUTO-ENTMCNC: 32.7 G/DL (ref 32.6–36.4)
MCHC RBC AUTO-ENTMCNC: 32.8 G/DL (ref 32.6–36.4)
MCHC RBC AUTO-ENTMCNC: 33 G/DL (ref 32.6–36.4)
MCHC RBC AUTO-ENTMCNC: 33.2 G/DL (ref 32.6–36.4)
MCHC RBC AUTO-ENTMCNC: 33.4 G/DL (ref 32.6–36.4)
MCHC RBC AUTO-ENTMCNC: 33.5 G/DL (ref 32.6–36.4)
MCHC RBC AUTO-ENTMCNC: 34 G/DL (ref 32–35)
MCHC RBC AUTO-ENTMCNC: 35.7 G/DL (ref 32–35)
MCV RBC AUTO: 87.4 FL (ref 83–97)
MCV RBC AUTO: 89 FL (ref 83–97)
MCV RBC AUTO: 90.5 FL (ref 79.8–96.2)
MCV RBC AUTO: 90.9 FL (ref 79.8–96.2)
MCV RBC AUTO: 91.4 FL (ref 79.8–96.2)
MCV RBC AUTO: 92.2 FL (ref 79.8–96.2)
MCV RBC AUTO: 92.2 FL (ref 79.8–96.2)
MCV RBC AUTO: 92.3 FL (ref 79.8–96.2)
MCV RBC AUTO: 92.6 FL (ref 79.8–96.2)
MCV RBC AUTO: 93.2 FL (ref 79.8–96.2)
MCV RBC AUTO: 93.3 FL (ref 79.8–96.2)
MCV RBC AUTO: 94 FL (ref 79.8–96.2)
MCV RBC AUTO: 94.1 FL (ref 79.8–96.2)
MCV RBC AUTO: 94.2 FL (ref 79.8–96.2)
MCV RBC AUTO: 94.7 FL (ref 79.8–96.2)
MCV RBC AUTO: 95.9 FL (ref 79.8–96.2)
METHADONE UR QL SCN: NEGATIVE
MONOCYTES # BLD AUTO: 0.23 10*3/MM3 (ref 0.2–1.2)
MONOCYTES # BLD AUTO: 0.29 10*3/MM3 (ref 0.2–1.2)
MONOCYTES # BLD AUTO: 0.35 10*3/MM3 (ref 0.2–1.2)
MONOCYTES # BLD AUTO: 0.38 10*3/MM3 (ref 0.2–1.2)
MONOCYTES # BLD AUTO: 0.45 10*3/MM3 (ref 0.2–1.2)
MONOCYTES # BLD AUTO: 0.46 10*3/MM3 (ref 0.2–1.2)
MONOCYTES # BLD AUTO: 0.58 10*3/MM3 (ref 0.2–1.2)
MONOCYTES # BLD AUTO: 0.58 10*3/MM3 (ref 0.2–1.2)
MONOCYTES # BLD AUTO: 0.63 10*3/MM3 (ref 0.2–1.2)
MONOCYTES # BLD AUTO: 0.8 10*3/MM3 (ref 0.2–1.2)
MONOCYTES # BLD AUTO: 0.83 10*3/MM3 (ref 0.2–1.2)
MONOCYTES # BLD AUTO: 0.85 10*3/MM3 (ref 0.2–1.2)
MONOCYTES # BLD AUTO: 0.94 10*3/MM3 (ref 0.25–0.8)
MONOCYTES # BLD AUTO: 0.94 10*3/MM3 (ref 0.2–1.2)
MONOCYTES # BLD AUTO: 1.65 10*3/MM3 (ref 0.25–0.8)
MONOCYTES NFR BLD AUTO: 1.2 % (ref 5–12)
MONOCYTES NFR BLD AUTO: 2 % (ref 5–12)
MONOCYTES NFR BLD AUTO: 2.2 % (ref 5–12)
MONOCYTES NFR BLD AUTO: 2.3 % (ref 5–12)
MONOCYTES NFR BLD AUTO: 2.3 % (ref 5–12)
MONOCYTES NFR BLD AUTO: 2.5 % (ref 5–12)
MONOCYTES NFR BLD AUTO: 2.5 % (ref 5–12)
MONOCYTES NFR BLD AUTO: 2.8 % (ref 5–12)
MONOCYTES NFR BLD AUTO: 3.5 % (ref 4–12)
MONOCYTES NFR BLD AUTO: 6 % (ref 5–12)
MONOCYTES NFR BLD AUTO: 6.3 % (ref 5–12)
MONOCYTES NFR BLD AUTO: 6.5 % (ref 5–12)
MONOCYTES NFR BLD AUTO: 7.1 % (ref 5–12)
MONOCYTES NFR BLD AUTO: 7.5 % (ref 5–12)
MONOCYTES NFR BLD AUTO: 7.6 % (ref 4–12)
NEUTROPHILS # BLD AUTO: 10.86 10*3/MM3 (ref 1.9–8.1)
NEUTROPHILS # BLD AUTO: 11.52 10*3/MM3 (ref 1.9–8.1)
NEUTROPHILS # BLD AUTO: 14.13 10*3/MM3 (ref 1.9–8.1)
NEUTROPHILS # BLD AUTO: 15.9 10*3/MM3 (ref 1.9–8.1)
NEUTROPHILS # BLD AUTO: 16.91 10*3/MM3 (ref 1.9–8.1)
NEUTROPHILS # BLD AUTO: 18.12 10*3/MM3 (ref 1.9–8.1)
NEUTROPHILS # BLD AUTO: 18.94 10*3/MM3 (ref 1.5–7)
NEUTROPHILS # BLD AUTO: 19.89 10*3/MM3 (ref 1.9–8.1)
NEUTROPHILS # BLD AUTO: 21.48 10*3/MM3 (ref 1.9–8.1)
NEUTROPHILS # BLD AUTO: 23.92 10*3/MM3 (ref 1.5–7)
NEUTROPHILS # BLD AUTO: 23.97 10*3/MM3 (ref 1.9–8.1)
NEUTROPHILS # BLD AUTO: 6.92 10*3/MM3 (ref 1.9–8.1)
NEUTROPHILS # BLD AUTO: 9.29 10*3/MM3 (ref 1.9–8.1)
NEUTROPHILS # BLD AUTO: 9.48 10*3/MM3 (ref 1.9–8.1)
NEUTROPHILS # BLD AUTO: 9.66 10*3/MM3 (ref 1.9–8.1)
NEUTROPHILS NFR BLD AUTO: 71.3 % (ref 42.7–76)
NEUTROPHILS NFR BLD AUTO: 76.9 % (ref 42.7–76)
NEUTROPHILS NFR BLD AUTO: 81.1 % (ref 42.7–76)
NEUTROPHILS NFR BLD AUTO: 82.2 % (ref 42.7–76)
NEUTROPHILS NFR BLD AUTO: 82.3 % (ref 42.7–76)
NEUTROPHILS NFR BLD AUTO: 85.9 % (ref 42.7–76)
NEUTROPHILS NFR BLD AUTO: 86.9 % (ref 39–75)
NEUTROPHILS NFR BLD AUTO: 88.6 % (ref 39–75)
NEUTROPHILS NFR BLD AUTO: 90.4 % (ref 42.7–76)
NEUTROPHILS NFR BLD AUTO: 92.2 % (ref 42.7–76)
NEUTROPHILS NFR BLD AUTO: 92.9 % (ref 42.7–76)
NEUTROPHILS NFR BLD AUTO: 93.7 % (ref 42.7–76)
NEUTROPHILS NFR BLD AUTO: 95.1 % (ref 42.7–76)
NEUTROPHILS NFR BLD AUTO: 95.3 % (ref 42.7–76)
NEUTROPHILS NFR BLD AUTO: 95.4 % (ref 42.7–76)
NITRITE UR QL STRIP: NEGATIVE
NITRITE UR QL STRIP: POSITIVE
NRBC BLD MANUAL-RTO: 0 /100 WBC (ref 0–0)
NRBC BLD MANUAL-RTO: 0.9 /100 WBC (ref 0–0)
NT-PROBNP SERPL-MCNC: 628.6 PG/ML (ref 5–900)
OPIATES UR QL: POSITIVE
OSMOLALITY UR: 783 MOSM/KG
OXYCODONE UR QL SCN: NEGATIVE
PATH REPORT.ADDENDUM SPEC: NORMAL
PATH REPORT.FINAL DX SPEC: NORMAL
PATH REPORT.GROSS SPEC: NORMAL
PH UR STRIP.AUTO: <=5 [PH] (ref 5–8)
PH UR STRIP.AUTO: <=5 [PH] (ref 5–8)
PLAT MORPH BLD: NORMAL
PLATELET # BLD AUTO: 160 10*3/MM3 (ref 140–500)
PLATELET # BLD AUTO: 204 10*3/MM3 (ref 140–500)
PLATELET # BLD AUTO: 233 10*3/MM3 (ref 140–500)
PLATELET # BLD AUTO: 327 10*3/MM3 (ref 140–500)
PLATELET # BLD AUTO: 328 10*3/MM3 (ref 140–500)
PLATELET # BLD AUTO: 345 10*3/MM3 (ref 150–375)
PLATELET # BLD AUTO: 369 10*3/MM3 (ref 140–500)
PLATELET # BLD AUTO: 374 10*3/MM3 (ref 140–500)
PLATELET # BLD AUTO: 381 10*3/MM3 (ref 140–500)
PLATELET # BLD AUTO: 383 10*3/MM3 (ref 140–500)
PLATELET # BLD AUTO: 390 10*3/MM3 (ref 150–375)
PLATELET # BLD AUTO: 401 10*3/MM3 (ref 140–500)
PLATELET # BLD AUTO: 412 10*3/MM3 (ref 140–500)
PLATELET # BLD AUTO: 416 10*3/MM3 (ref 140–500)
PLATELET # BLD AUTO: 461 10*3/MM3 (ref 140–500)
PLATELET # BLD AUTO: 492 10*3/MM3 (ref 140–500)
PMV BLD AUTO: 10 FL (ref 8.9–12.1)
PMV BLD AUTO: 10.3 FL (ref 6–12)
PMV BLD AUTO: 10.4 FL (ref 6–12)
PMV BLD AUTO: 10.5 FL (ref 6–12)
PMV BLD AUTO: 10.6 FL (ref 6–12)
PMV BLD AUTO: 10.7 FL (ref 6–12)
PMV BLD AUTO: 10.8 FL (ref 6–12)
PMV BLD AUTO: 10.9 FL (ref 6–12)
PMV BLD AUTO: 10.9 FL (ref 6–12)
PMV BLD AUTO: 10.9 FL (ref 8.9–12.1)
PMV BLD AUTO: 11 FL (ref 6–12)
PMV BLD AUTO: 11 FL (ref 6–12)
PMV BLD AUTO: 11.1 FL (ref 6–12)
PMV BLD AUTO: 11.2 FL (ref 6–12)
PMV BLD AUTO: 11.2 FL (ref 6–12)
PMV BLD AUTO: 11.3 FL (ref 6–12)
POTASSIUM BLD-SCNC: 3.7 MMOL/L (ref 3.5–5.2)
POTASSIUM BLD-SCNC: 3.9 MMOL/L (ref 3.5–5.2)
POTASSIUM BLD-SCNC: 3.9 MMOL/L (ref 3.5–5.2)
POTASSIUM BLD-SCNC: 4 MMOL/L (ref 3.5–5.2)
POTASSIUM BLD-SCNC: 4.1 MMOL/L (ref 3.5–5.2)
POTASSIUM BLD-SCNC: 4.4 MMOL/L (ref 3.5–5.2)
POTASSIUM BLD-SCNC: 4.4 MMOL/L (ref 3.5–5.2)
POTASSIUM BLD-SCNC: 4.5 MMOL/L (ref 3.5–5.2)
POTASSIUM BLD-SCNC: 4.6 MMOL/L (ref 3.5–5.2)
POTASSIUM BLD-SCNC: 4.7 MMOL/L (ref 3.5–5.2)
POTASSIUM BLD-SCNC: 4.7 MMOL/L (ref 3.5–5.2)
POTASSIUM BLD-SCNC: 4.8 MMOL/L (ref 3.5–5.2)
POTASSIUM BLD-SCNC: 4.9 MMOL/L (ref 3.5–4.7)
POTASSIUM BLD-SCNC: 4.9 MMOL/L (ref 3.5–5.2)
PROT SERPL-MCNC: 5.1 G/DL (ref 6–8.5)
PROT SERPL-MCNC: 5.7 G/DL (ref 6–8.5)
PROT SERPL-MCNC: 6.9 G/DL (ref 6.3–8)
PROT SERPL-MCNC: 6.9 G/DL (ref 6–8.5)
PROT SERPL-MCNC: 7.7 G/DL (ref 6–8.5)
PROT SERPL-MCNC: 8.1 G/DL (ref 6–8.5)
PROT UR QL STRIP: ABNORMAL
PROT UR QL STRIP: ABNORMAL
PROTHROMBIN TIME: 13.7 SECONDS (ref 11.7–14.2)
PROTHROMBIN TIME: 13.8 SECONDS (ref 11.7–14.2)
PROTHROMBIN TIME: 14 SECONDS (ref 11.7–14.2)
PROTHROMBIN TIME: 14.1 SECONDS (ref 11.7–14.2)
PROTHROMBIN TIME: 14.7 SECONDS (ref 11.7–14.2)
RBC # BLD AUTO: 3.94 10*6/MM3 (ref 4.6–6)
RBC # BLD AUTO: 4.08 10*6/MM3 (ref 4.6–6)
RBC # BLD AUTO: 4.09 10*6/MM3 (ref 4.6–6)
RBC # BLD AUTO: 4.15 10*6/MM3 (ref 4.6–6)
RBC # BLD AUTO: 4.28 10*6/MM3 (ref 4.6–6)
RBC # BLD AUTO: 4.31 10*6/MM3 (ref 4.6–6)
RBC # BLD AUTO: 4.33 10*6/MM3 (ref 4.6–6)
RBC # BLD AUTO: 4.36 10*6/MM3 (ref 4.6–6)
RBC # BLD AUTO: 4.41 10*6/MM3 (ref 4.6–6)
RBC # BLD AUTO: 4.46 10*6/MM3 (ref 4.6–6)
RBC # BLD AUTO: 4.51 10*6/MM3 (ref 4.6–6)
RBC # BLD AUTO: 4.55 10*6/MM3 (ref 4.6–6)
RBC # BLD AUTO: 4.59 10*6/MM3 (ref 4.6–6)
RBC # BLD AUTO: 4.75 10*6/MM3 (ref 4.3–5.5)
RBC # BLD AUTO: 4.82 10*6/MM3 (ref 4.3–5.5)
RBC # BLD AUTO: 5.08 10*6/MM3 (ref 4.6–6)
RBC # UR: ABNORMAL /HPF
RBC # UR: ABNORMAL /HPF
RBC MORPH BLD: NORMAL
REF LAB TEST METHOD: ABNORMAL
REF LAB TEST METHOD: ABNORMAL
REF LAB TEST METHOD: NORMAL
RH BLD: POSITIVE
SODIUM BLD-SCNC: 129 MMOL/L (ref 136–145)
SODIUM BLD-SCNC: 130 MMOL/L (ref 134–145)
SODIUM BLD-SCNC: 130 MMOL/L (ref 136–145)
SODIUM BLD-SCNC: 132 MMOL/L (ref 136–145)
SODIUM BLD-SCNC: 132 MMOL/L (ref 136–145)
SODIUM BLD-SCNC: 133 MMOL/L (ref 136–145)
SODIUM BLD-SCNC: 134 MMOL/L (ref 136–145)
SODIUM BLD-SCNC: 135 MMOL/L (ref 136–145)
SODIUM BLD-SCNC: 135 MMOL/L (ref 136–145)
SODIUM BLD-SCNC: 139 MMOL/L (ref 136–145)
SODIUM BLD-SCNC: 144 MMOL/L (ref 136–145)
SODIUM UR-SCNC: 31 MMOL/L
SP GR UR STRIP: 1.03 (ref 1–1.03)
SP GR UR STRIP: >=1.03 (ref 1–1.03)
SQUAMOUS #/AREA URNS HPF: ABNORMAL /HPF
SQUAMOUS #/AREA URNS HPF: ABNORMAL /HPF
STRESS TARGET HR: 133 BPM
T&S EXPIRATION DATE: NORMAL
T4 FREE SERPL-MCNC: 1.43 NG/DL (ref 0.93–1.7)
TIBC SERPL-MCNC: 264 MCG/DL
TRANSFERRIN SERPL-MCNC: 177 MG/DL (ref 200–360)
TROPONIN T SERPL-MCNC: <0.01 NG/ML (ref 0–0.03)
TSH SERPL DL<=0.05 MIU/L-ACNC: 3.13 MIU/ML (ref 0.27–4.2)
UROBILINOGEN UR QL STRIP: ABNORMAL
UROBILINOGEN UR QL STRIP: ABNORMAL
VIT B12 BLD-MCNC: 357 PG/ML (ref 211–946)
WBC MORPH BLD: NORMAL
WBC NRBC COR # BLD: 10.48 10*3/MM3 (ref 4.5–10.7)
WBC NRBC COR # BLD: 11.29 10*3/MM3 (ref 4.5–10.7)
WBC NRBC COR # BLD: 11.33 10*3/MM3 (ref 4.5–10.7)
WBC NRBC COR # BLD: 11.74 10*3/MM3 (ref 4.5–10.7)
WBC NRBC COR # BLD: 13.41 10*3/MM3 (ref 4.5–10.7)
WBC NRBC COR # BLD: 14.97 10*3/MM3 (ref 4.5–10.7)
WBC NRBC COR # BLD: 15.09 10*3/MM3 (ref 4.5–10.7)
WBC NRBC COR # BLD: 17.26 10*3/MM3 (ref 4.5–10.7)
WBC NRBC COR # BLD: 17.75 10*3/MM3 (ref 4.5–10.7)
WBC NRBC COR # BLD: 19.5 10*3/MM3 (ref 4.5–10.7)
WBC NRBC COR # BLD: 20.85 10*3/MM3 (ref 4.5–10.7)
WBC NRBC COR # BLD: 21.82 10*3/MM3 (ref 4–10)
WBC NRBC COR # BLD: 25.01 10*3/MM3 (ref 4.5–10.7)
WBC NRBC COR # BLD: 25.22 10*3/MM3 (ref 4.5–10.7)
WBC NRBC COR # BLD: 27 10*3/MM3 (ref 4–10)
WBC NRBC COR # BLD: 9.71 10*3/MM3 (ref 4.5–10.7)
WBC UR QL AUTO: ABNORMAL /HPF
WBC UR QL AUTO: ABNORMAL /HPF
WHOLE BLOOD HOLD SPECIMEN: NORMAL
WHOLE BLOOD HOLD SPECIMEN: NORMAL

## 2018-01-01 PROCEDURE — 99232 SBSQ HOSP IP/OBS MODERATE 35: CPT | Performed by: INTERNAL MEDICINE

## 2018-01-01 PROCEDURE — 77412 RADIATION TX DELIVERY LVL 3: CPT | Performed by: RADIOLOGY

## 2018-01-01 PROCEDURE — 85025 COMPLETE CBC W/AUTO DIFF WBC: CPT | Performed by: HOSPITALIST

## 2018-01-01 PROCEDURE — 84300 ASSAY OF URINE SODIUM: CPT | Performed by: INTERNAL MEDICINE

## 2018-01-01 PROCEDURE — 63710000001 DEXAMETHASONE PER 0.25 MG: Performed by: INTERNAL MEDICINE

## 2018-01-01 PROCEDURE — 77300 RADIATION THERAPY DOSE PLAN: CPT | Performed by: RADIOLOGY

## 2018-01-01 PROCEDURE — 81001 URINALYSIS AUTO W/SCOPE: CPT | Performed by: EMERGENCY MEDICINE

## 2018-01-01 PROCEDURE — 80307 DRUG TEST PRSMV CHEM ANLYZR: CPT | Performed by: EMERGENCY MEDICINE

## 2018-01-01 PROCEDURE — 25010000002 LORAZEPAM PER 2 MG: Performed by: INTERNAL MEDICINE

## 2018-01-01 PROCEDURE — 84484 ASSAY OF TROPONIN QUANT: CPT | Performed by: EMERGENCY MEDICINE

## 2018-01-01 PROCEDURE — 88300 SURGICAL PATH GROSS: CPT | Performed by: NURSE PRACTITIONER

## 2018-01-01 PROCEDURE — 0 TECHNETIUM MEDRONATE KIT: Performed by: INTERNAL MEDICINE

## 2018-01-01 PROCEDURE — 71046 X-RAY EXAM CHEST 2 VIEWS: CPT

## 2018-01-01 PROCEDURE — G0378 HOSPITAL OBSERVATION PER HR: HCPCS

## 2018-01-01 PROCEDURE — 25010000002 MORPHINE PER 10 MG: Performed by: EMERGENCY MEDICINE

## 2018-01-01 PROCEDURE — 63710000001 INSULIN ASPART PER 5 UNITS: Performed by: NURSE PRACTITIONER

## 2018-01-01 PROCEDURE — 82962 GLUCOSE BLOOD TEST: CPT

## 2018-01-01 PROCEDURE — 94799 UNLISTED PULMONARY SVC/PX: CPT

## 2018-01-01 PROCEDURE — 99233 SBSQ HOSP IP/OBS HIGH 50: CPT | Performed by: INTERNAL MEDICINE

## 2018-01-01 PROCEDURE — 93010 ELECTROCARDIOGRAM REPORT: CPT | Performed by: INTERNAL MEDICINE

## 2018-01-01 PROCEDURE — 85730 THROMBOPLASTIN TIME PARTIAL: CPT | Performed by: EMERGENCY MEDICINE

## 2018-01-01 PROCEDURE — 85610 PROTHROMBIN TIME: CPT | Performed by: INTERNAL MEDICINE

## 2018-01-01 PROCEDURE — 80048 BASIC METABOLIC PNL TOTAL CA: CPT | Performed by: HOSPITALIST

## 2018-01-01 PROCEDURE — 77280 THER RAD SIMULAJ FIELD SMPL: CPT | Performed by: RADIOLOGY

## 2018-01-01 PROCEDURE — 36415 COLL VENOUS BLD VENIPUNCTURE: CPT | Performed by: NURSE PRACTITIONER

## 2018-01-01 PROCEDURE — 77334 RADIATION TREATMENT AID(S): CPT | Performed by: RADIOLOGY

## 2018-01-01 PROCEDURE — 85025 COMPLETE CBC W/AUTO DIFF WBC: CPT | Performed by: INTERNAL MEDICINE

## 2018-01-01 PROCEDURE — 25010000002 HYDROMORPHONE PER 4 MG: Performed by: INTERNAL MEDICINE

## 2018-01-01 PROCEDURE — 85025 COMPLETE CBC W/AUTO DIFF WBC: CPT | Performed by: NURSE PRACTITIONER

## 2018-01-01 PROCEDURE — 84466 ASSAY OF TRANSFERRIN: CPT | Performed by: INTERNAL MEDICINE

## 2018-01-01 PROCEDURE — 0 GADOBENATE DIMEGLUMINE 529 MG/ML SOLUTION: Performed by: INTERNAL MEDICINE

## 2018-01-01 PROCEDURE — 80053 COMPREHEN METABOLIC PANEL: CPT | Performed by: EMERGENCY MEDICINE

## 2018-01-01 PROCEDURE — 80048 BASIC METABOLIC PNL TOTAL CA: CPT | Performed by: INTERNAL MEDICINE

## 2018-01-01 PROCEDURE — 85610 PROTHROMBIN TIME: CPT | Performed by: EMERGENCY MEDICINE

## 2018-01-01 PROCEDURE — 80048 BASIC METABOLIC PNL TOTAL CA: CPT | Performed by: NURSE PRACTITIONER

## 2018-01-01 PROCEDURE — 0BBF3ZX EXCISION OF RIGHT LOWER LUNG LOBE, PERCUTANEOUS APPROACH, DIAGNOSTIC: ICD-10-PCS | Performed by: RADIOLOGY

## 2018-01-01 PROCEDURE — 25010000002 HYDROMORPHONE PER 4 MG: Performed by: HOSPITALIST

## 2018-01-01 PROCEDURE — 93005 ELECTROCARDIOGRAM TRACING: CPT | Performed by: EMERGENCY MEDICINE

## 2018-01-01 PROCEDURE — 25010000002 DEXAMETHASONE PER 1 MG: Performed by: INTERNAL MEDICINE

## 2018-01-01 PROCEDURE — 99254 IP/OBS CNSLTJ NEW/EST MOD 60: CPT | Performed by: INTERNAL MEDICINE

## 2018-01-01 PROCEDURE — 36415 COLL VENOUS BLD VENIPUNCTURE: CPT

## 2018-01-01 PROCEDURE — 84443 ASSAY THYROID STIM HORMONE: CPT | Performed by: EMERGENCY MEDICINE

## 2018-01-01 PROCEDURE — A9577 INJ MULTIHANCE: HCPCS | Performed by: INTERNAL MEDICINE

## 2018-01-01 PROCEDURE — 85025 COMPLETE CBC W/AUTO DIFF WBC: CPT | Performed by: EMERGENCY MEDICINE

## 2018-01-01 PROCEDURE — 85007 BL SMEAR W/DIFF WBC COUNT: CPT | Performed by: NURSE PRACTITIONER

## 2018-01-01 PROCEDURE — 99253 IP/OBS CNSLTJ NEW/EST LOW 45: CPT | Performed by: INTERNAL MEDICINE

## 2018-01-01 PROCEDURE — 86850 RBC ANTIBODY SCREEN: CPT | Performed by: EMERGENCY MEDICINE

## 2018-01-01 PROCEDURE — 25010000002 ONDANSETRON PER 1 MG: Performed by: EMERGENCY MEDICINE

## 2018-01-01 PROCEDURE — 82607 VITAMIN B-12: CPT | Performed by: INTERNAL MEDICINE

## 2018-01-01 PROCEDURE — 80053 COMPREHEN METABOLIC PANEL: CPT | Performed by: NURSE PRACTITIONER

## 2018-01-01 PROCEDURE — 99231 SBSQ HOSP IP/OBS SF/LOW 25: CPT | Performed by: INTERNAL MEDICINE

## 2018-01-01 PROCEDURE — 83605 ASSAY OF LACTIC ACID: CPT | Performed by: EMERGENCY MEDICINE

## 2018-01-01 PROCEDURE — 90791 PSYCH DIAGNOSTIC EVALUATION: CPT | Performed by: SOCIAL WORKER

## 2018-01-01 PROCEDURE — 93306 TTE W/DOPPLER COMPLETE: CPT | Performed by: INTERNAL MEDICINE

## 2018-01-01 PROCEDURE — 77075 RADEX OSSEOUS SURVEY COMPL: CPT

## 2018-01-01 PROCEDURE — 25010000002 ENOXAPARIN PER 10 MG: Performed by: INTERNAL MEDICINE

## 2018-01-01 PROCEDURE — 74176 CT ABD & PELVIS W/O CONTRAST: CPT

## 2018-01-01 PROCEDURE — 88342 IMHCHEM/IMCYTCHM 1ST ANTB: CPT | Performed by: NURSE PRACTITIONER

## 2018-01-01 PROCEDURE — 83735 ASSAY OF MAGNESIUM: CPT | Performed by: EMERGENCY MEDICINE

## 2018-01-01 PROCEDURE — 85730 THROMBOPLASTIN TIME PARTIAL: CPT | Performed by: HOSPITALIST

## 2018-01-01 PROCEDURE — 77290 THER RAD SIMULAJ FIELD CPLX: CPT | Performed by: RADIOLOGY

## 2018-01-01 PROCEDURE — 83880 ASSAY OF NATRIURETIC PEPTIDE: CPT | Performed by: EMERGENCY MEDICINE

## 2018-01-01 PROCEDURE — 71045 X-RAY EXAM CHEST 1 VIEW: CPT

## 2018-01-01 PROCEDURE — 88341 IMHCHEM/IMCYTCHM EA ADD ANTB: CPT | Performed by: NURSE PRACTITIONER

## 2018-01-01 PROCEDURE — 94640 AIRWAY INHALATION TREATMENT: CPT

## 2018-01-01 PROCEDURE — 77012 CT SCAN FOR NEEDLE BIOPSY: CPT

## 2018-01-01 PROCEDURE — D0002ZZ BEAM RADIATION OF BRAIN USING PHOTONS >10 MEV: ICD-10-PCS | Performed by: RADIOLOGY

## 2018-01-01 PROCEDURE — 88313 SPECIAL STAINS GROUP 2: CPT | Performed by: INTERNAL MEDICINE

## 2018-01-01 PROCEDURE — 77427 RADIATION TX MANAGEMENT X5: CPT | Performed by: RADIOLOGY

## 2018-01-01 PROCEDURE — 99284 EMERGENCY DEPT VISIT MOD MDM: CPT

## 2018-01-01 PROCEDURE — 88305 TISSUE EXAM BY PATHOLOGIST: CPT | Performed by: NURSE PRACTITIONER

## 2018-01-01 PROCEDURE — 87040 BLOOD CULTURE FOR BACTERIA: CPT | Performed by: EMERGENCY MEDICINE

## 2018-01-01 PROCEDURE — 70553 MRI BRAIN STEM W/O & W/DYE: CPT

## 2018-01-01 PROCEDURE — 25010000002 IOPAMIDOL 61 % SOLUTION: Performed by: INTERNAL MEDICINE

## 2018-01-01 PROCEDURE — 82728 ASSAY OF FERRITIN: CPT | Performed by: INTERNAL MEDICINE

## 2018-01-01 PROCEDURE — 83540 ASSAY OF IRON: CPT | Performed by: INTERNAL MEDICINE

## 2018-01-01 PROCEDURE — 25010000002 ADENOSINE PER 6 MG: Performed by: EMERGENCY MEDICINE

## 2018-01-01 PROCEDURE — 93005 ELECTROCARDIOGRAM TRACING: CPT | Performed by: INTERNAL MEDICINE

## 2018-01-01 PROCEDURE — 99283 EMERGENCY DEPT VISIT LOW MDM: CPT

## 2018-01-01 PROCEDURE — 99214 OFFICE O/P EST MOD 30 MIN: CPT | Performed by: NURSE PRACTITIONER

## 2018-01-01 PROCEDURE — 25010000002 IOPAMIDOL 61 % SOLUTION: Performed by: EMERGENCY MEDICINE

## 2018-01-01 PROCEDURE — 25010000002 ZOLEDRONIC ACID PER 1 MG: Performed by: INTERNAL MEDICINE

## 2018-01-01 PROCEDURE — 25010000002 ADENOSINE PER 6 MG

## 2018-01-01 PROCEDURE — 83690 ASSAY OF LIPASE: CPT | Performed by: INTERNAL MEDICINE

## 2018-01-01 PROCEDURE — 25010000002 ONDANSETRON PER 1 MG: Performed by: NURSE PRACTITIONER

## 2018-01-01 PROCEDURE — 85730 THROMBOPLASTIN TIME PARTIAL: CPT | Performed by: INTERNAL MEDICINE

## 2018-01-01 PROCEDURE — 78306 BONE IMAGING WHOLE BODY: CPT

## 2018-01-01 PROCEDURE — 77417 THER RADIOLOGY PORT IMAGE(S): CPT | Performed by: RADIOLOGY

## 2018-01-01 PROCEDURE — 99285 EMERGENCY DEPT VISIT HI MDM: CPT

## 2018-01-01 PROCEDURE — 0FB23ZX EXCISION OF LEFT LOBE LIVER, PERCUTANEOUS APPROACH, DIAGNOSTIC: ICD-10-PCS | Performed by: RADIOLOGY

## 2018-01-01 PROCEDURE — 83690 ASSAY OF LIPASE: CPT | Performed by: NURSE PRACTITIONER

## 2018-01-01 PROCEDURE — 25010000002 MORPHINE PER 10 MG

## 2018-01-01 PROCEDURE — 99252 IP/OBS CONSLTJ NEW/EST SF 35: CPT | Performed by: RADIOLOGY

## 2018-01-01 PROCEDURE — 71260 CT THORAX DX C+: CPT

## 2018-01-01 PROCEDURE — 77295 3-D RADIOTHERAPY PLAN: CPT | Performed by: RADIOLOGY

## 2018-01-01 PROCEDURE — 88305 TISSUE EXAM BY PATHOLOGIST: CPT | Performed by: INTERNAL MEDICINE

## 2018-01-01 PROCEDURE — 36416 COLLJ CAPILLARY BLOOD SPEC: CPT | Performed by: INTERNAL MEDICINE

## 2018-01-01 PROCEDURE — 85027 COMPLETE CBC AUTOMATED: CPT | Performed by: NURSE PRACTITIONER

## 2018-01-01 PROCEDURE — 74177 CT ABD & PELVIS W/CONTRAST: CPT

## 2018-01-01 PROCEDURE — 84439 ASSAY OF FREE THYROXINE: CPT | Performed by: EMERGENCY MEDICINE

## 2018-01-01 PROCEDURE — 77263 THER RADIOLOGY TX PLNG CPLX: CPT | Performed by: RADIOLOGY

## 2018-01-01 PROCEDURE — 83935 ASSAY OF URINE OSMOLALITY: CPT | Performed by: INTERNAL MEDICINE

## 2018-01-01 PROCEDURE — A9503 TC99M MEDRONATE: HCPCS | Performed by: INTERNAL MEDICINE

## 2018-01-01 PROCEDURE — 88307 TISSUE EXAM BY PATHOLOGIST: CPT | Performed by: INTERNAL MEDICINE

## 2018-01-01 PROCEDURE — 86900 BLOOD TYPING SEROLOGIC ABO: CPT | Performed by: EMERGENCY MEDICINE

## 2018-01-01 PROCEDURE — 99255 IP/OBS CONSLTJ NEW/EST HI 80: CPT | Performed by: INTERNAL MEDICINE

## 2018-01-01 PROCEDURE — 73502 X-RAY EXAM HIP UNI 2-3 VIEWS: CPT

## 2018-01-01 PROCEDURE — 82746 ASSAY OF FOLIC ACID SERUM: CPT | Performed by: INTERNAL MEDICINE

## 2018-01-01 PROCEDURE — 77336 RADIATION PHYSICS CONSULT: CPT | Performed by: RADIOLOGY

## 2018-01-01 PROCEDURE — 86901 BLOOD TYPING SEROLOGIC RH(D): CPT | Performed by: EMERGENCY MEDICINE

## 2018-01-01 PROCEDURE — 93306 TTE W/DOPPLER COMPLETE: CPT

## 2018-01-01 PROCEDURE — 99215 OFFICE O/P EST HI 40 MIN: CPT | Performed by: INTERNAL MEDICINE

## 2018-01-01 PROCEDURE — 74018 RADEX ABDOMEN 1 VIEW: CPT

## 2018-01-01 PROCEDURE — 80053 COMPREHEN METABOLIC PANEL: CPT | Performed by: INTERNAL MEDICINE

## 2018-01-01 RX ORDER — ONDANSETRON 4 MG/1
4 TABLET, FILM COATED ORAL EVERY 8 HOURS PRN
Qty: 30 TABLET | Refills: 0 | Status: SHIPPED | OUTPATIENT
Start: 2018-01-01 | End: 2018-01-01

## 2018-01-01 RX ORDER — ALPRAZOLAM 0.5 MG/1
0.5 TABLET ORAL 2 TIMES DAILY PRN
Status: DISCONTINUED | OUTPATIENT
Start: 2018-01-01 | End: 2018-01-01

## 2018-01-01 RX ORDER — ONDANSETRON 2 MG/ML
4 INJECTION INTRAMUSCULAR; INTRAVENOUS EVERY 6 HOURS PRN
Status: DISCONTINUED | OUTPATIENT
Start: 2018-01-01 | End: 2018-01-01 | Stop reason: HOSPADM

## 2018-01-01 RX ORDER — LEVETIRACETAM 500 MG/1
500 TABLET ORAL EVERY 12 HOURS SCHEDULED
Qty: 60 TABLET | Refills: 0 | Status: SHIPPED | OUTPATIENT
Start: 2018-01-01

## 2018-01-01 RX ORDER — GLYCOPYRROLATE 0.2 MG/ML
0.2 INJECTION INTRAMUSCULAR; INTRAVENOUS EVERY 4 HOURS PRN
Status: CANCELLED | OUTPATIENT
Start: 2018-01-01

## 2018-01-01 RX ORDER — LEVETIRACETAM 500 MG/1
500 TABLET ORAL EVERY 12 HOURS SCHEDULED
Status: DISCONTINUED | OUTPATIENT
Start: 2018-01-01 | End: 2018-01-01 | Stop reason: HOSPADM

## 2018-01-01 RX ORDER — LIDOCAINE HYDROCHLORIDE 10 MG/ML
20 INJECTION, SOLUTION INFILTRATION; PERINEURAL ONCE
Status: COMPLETED | OUTPATIENT
Start: 2018-01-01 | End: 2018-01-01

## 2018-01-01 RX ORDER — METOPROLOL SUCCINATE 25 MG/1
12.5 TABLET, EXTENDED RELEASE ORAL
Status: DISCONTINUED | OUTPATIENT
Start: 2018-01-01 | End: 2018-01-01 | Stop reason: HOSPADM

## 2018-01-01 RX ORDER — PANTOPRAZOLE SODIUM 40 MG/1
40 TABLET, DELAYED RELEASE ORAL DAILY
Status: DISCONTINUED | OUTPATIENT
Start: 2018-01-01 | End: 2018-01-01 | Stop reason: HOSPADM

## 2018-01-01 RX ORDER — FAMOTIDINE 20 MG/1
20 TABLET, FILM COATED ORAL 2 TIMES DAILY
Status: DISCONTINUED | OUTPATIENT
Start: 2018-01-01 | End: 2018-01-01 | Stop reason: HOSPADM

## 2018-01-01 RX ORDER — PROMETHAZINE HYDROCHLORIDE 25 MG/1
25 SUPPOSITORY RECTAL EVERY 6 HOURS PRN
Status: DISCONTINUED | OUTPATIENT
Start: 2018-01-01 | End: 2018-01-01 | Stop reason: HOSPADM

## 2018-01-01 RX ORDER — ONDANSETRON 4 MG/1
4 TABLET, FILM COATED ORAL EVERY 8 HOURS PRN
Qty: 30 TABLET | Refills: 0 | Status: SHIPPED | OUTPATIENT
Start: 2018-01-01

## 2018-01-01 RX ORDER — HYDROMORPHONE HYDROCHLORIDE 1 MG/ML
0.5 INJECTION, SOLUTION INTRAMUSCULAR; INTRAVENOUS; SUBCUTANEOUS
Status: DISCONTINUED | OUTPATIENT
Start: 2018-01-01 | End: 2018-01-01

## 2018-01-01 RX ORDER — PROMETHAZINE HYDROCHLORIDE 12.5 MG/1
12.5 TABLET ORAL EVERY 6 HOURS PRN
Status: DISCONTINUED | OUTPATIENT
Start: 2018-01-01 | End: 2018-01-01 | Stop reason: HOSPADM

## 2018-01-01 RX ORDER — ONDANSETRON 4 MG/1
4 TABLET, FILM COATED ORAL EVERY 6 HOURS PRN
Status: DISCONTINUED | OUTPATIENT
Start: 2018-01-01 | End: 2018-01-01 | Stop reason: HOSPADM

## 2018-01-01 RX ORDER — ACETAMINOPHEN 325 MG/1
650 TABLET ORAL EVERY 4 HOURS PRN
Status: CANCELLED | OUTPATIENT
Start: 2018-01-01

## 2018-01-01 RX ORDER — OXYCODONE AND ACETAMINOPHEN 10; 325 MG/1; MG/1
1-2 TABLET ORAL EVERY 4 HOURS PRN
Qty: 180 TABLET | Refills: 0 | Status: SHIPPED | OUTPATIENT
Start: 2018-01-01 | End: 2018-01-01 | Stop reason: SDUPTHER

## 2018-01-01 RX ORDER — CALCIUM CARBONATE 200(500)MG
2 TABLET,CHEWABLE ORAL 3 TIMES DAILY PRN
Status: DISCONTINUED | OUTPATIENT
Start: 2018-01-01 | End: 2018-01-01 | Stop reason: HOSPADM

## 2018-01-01 RX ORDER — ALPRAZOLAM 0.5 MG/1
0.5 TABLET ORAL 2 TIMES DAILY PRN
Status: SHIPPED | OUTPATIENT
Start: 2018-01-01

## 2018-01-01 RX ORDER — OXYCODONE AND ACETAMINOPHEN 10; 325 MG/1; MG/1
1 TABLET ORAL EVERY 6 HOURS PRN
Qty: 40 TABLET | Refills: 0 | Status: SHIPPED | OUTPATIENT
Start: 2018-01-01 | End: 2018-01-01 | Stop reason: SDUPTHER

## 2018-01-01 RX ORDER — NICOTINE POLACRILEX 4 MG
15 LOZENGE BUCCAL
Status: DISCONTINUED | OUTPATIENT
Start: 2018-01-01 | End: 2018-01-01 | Stop reason: HOSPADM

## 2018-01-01 RX ORDER — IPRATROPIUM BROMIDE AND ALBUTEROL SULFATE 2.5; .5 MG/3ML; MG/3ML
3 SOLUTION RESPIRATORY (INHALATION) EVERY 4 HOURS PRN
Status: DISCONTINUED | OUTPATIENT
Start: 2018-01-01 | End: 2018-01-01 | Stop reason: HOSPADM

## 2018-01-01 RX ORDER — ONDANSETRON 2 MG/ML
4 INJECTION INTRAMUSCULAR; INTRAVENOUS ONCE
Status: COMPLETED | OUTPATIENT
Start: 2018-01-01 | End: 2018-01-01

## 2018-01-01 RX ORDER — DILTIAZEM HYDROCHLORIDE 240 MG/1
240 CAPSULE, COATED, EXTENDED RELEASE ORAL
Status: DISCONTINUED | OUTPATIENT
Start: 2018-01-01 | End: 2018-01-01 | Stop reason: HOSPADM

## 2018-01-01 RX ORDER — ALBUTEROL SULFATE 2.5 MG/3ML
2.5 SOLUTION RESPIRATORY (INHALATION) ONCE AS NEEDED
Status: DISCONTINUED | OUTPATIENT
Start: 2018-01-01 | End: 2018-01-01

## 2018-01-01 RX ORDER — NITROGLYCERIN 0.4 MG/1
0.4 TABLET SUBLINGUAL
Status: DISCONTINUED | OUTPATIENT
Start: 2018-01-01 | End: 2018-01-01 | Stop reason: HOSPADM

## 2018-01-01 RX ORDER — HYDROCODONE BITARTRATE AND ACETAMINOPHEN 7.5; 325 MG/1; MG/1
2 TABLET ORAL EVERY 4 HOURS PRN
Status: DISCONTINUED | OUTPATIENT
Start: 2018-01-01 | End: 2018-01-01

## 2018-01-01 RX ORDER — OXYCODONE HCL 20 MG/1
20 TABLET, FILM COATED, EXTENDED RELEASE ORAL EVERY 12 HOURS
Qty: 60 TABLET | Refills: 0 | Status: SHIPPED | OUTPATIENT
Start: 2018-01-01 | End: 2018-01-01 | Stop reason: SDUPTHER

## 2018-01-01 RX ORDER — HYDROCODONE BITARTRATE AND ACETAMINOPHEN 7.5; 325 MG/1; MG/1
1 TABLET ORAL EVERY 4 HOURS PRN
Status: DISCONTINUED | OUTPATIENT
Start: 2018-01-01 | End: 2018-01-01 | Stop reason: HOSPADM

## 2018-01-01 RX ORDER — NAPROXEN 500 MG/1
500 TABLET ORAL 2 TIMES DAILY WITH MEALS
Qty: 60 TABLET | Refills: 1 | Status: SHIPPED | OUTPATIENT
Start: 2018-01-01

## 2018-01-01 RX ORDER — BISACODYL 10 MG
10 SUPPOSITORY, RECTAL RECTAL DAILY PRN
Status: DISCONTINUED | OUTPATIENT
Start: 2018-01-01 | End: 2018-01-01 | Stop reason: HOSPADM

## 2018-01-01 RX ORDER — OXYCODONE HCL 10 MG/1
10 TABLET, FILM COATED, EXTENDED RELEASE ORAL EVERY 12 HOURS
Qty: 20 TABLET | Refills: 0 | Status: SHIPPED | OUTPATIENT
Start: 2018-01-01 | End: 2018-01-01

## 2018-01-01 RX ORDER — SODIUM CHLORIDE 0.9 % (FLUSH) 0.9 %
1-10 SYRINGE (ML) INJECTION AS NEEDED
Status: DISCONTINUED | OUTPATIENT
Start: 2018-01-01 | End: 2018-01-01 | Stop reason: HOSPADM

## 2018-01-01 RX ORDER — PROMETHAZINE HYDROCHLORIDE 25 MG/ML
12.5 INJECTION, SOLUTION INTRAMUSCULAR; INTRAVENOUS EVERY 6 HOURS PRN
Status: DISCONTINUED | OUTPATIENT
Start: 2018-01-01 | End: 2018-01-01 | Stop reason: HOSPADM

## 2018-01-01 RX ORDER — DEXAMETHASONE 4 MG/1
4 TABLET ORAL EVERY 6 HOURS SCHEDULED
Qty: 120 TABLET | Refills: 0 | Status: SHIPPED | OUTPATIENT
Start: 2018-01-01

## 2018-01-01 RX ORDER — IPRATROPIUM BROMIDE AND ALBUTEROL SULFATE 2.5; .5 MG/3ML; MG/3ML
3 SOLUTION RESPIRATORY (INHALATION)
Status: DISCONTINUED | OUTPATIENT
Start: 2018-01-01 | End: 2018-01-01

## 2018-01-01 RX ORDER — DEXAMETHASONE SODIUM PHOSPHATE 4 MG/ML
4 INJECTION, SOLUTION INTRA-ARTICULAR; INTRALESIONAL; INTRAMUSCULAR; INTRAVENOUS; SOFT TISSUE EVERY 8 HOURS
Status: CANCELLED | OUTPATIENT
Start: 2018-01-01

## 2018-01-01 RX ORDER — ACETAMINOPHEN 325 MG/1
650 TABLET ORAL EVERY 4 HOURS PRN
Status: DISCONTINUED | OUTPATIENT
Start: 2018-01-01 | End: 2018-01-01 | Stop reason: HOSPADM

## 2018-01-01 RX ORDER — BISACODYL 10 MG
10 SUPPOSITORY, RECTAL RECTAL DAILY
Status: DISCONTINUED | OUTPATIENT
Start: 2018-01-01 | End: 2018-01-01

## 2018-01-01 RX ORDER — DEXAMETHASONE 4 MG/1
4 TABLET ORAL EVERY 6 HOURS SCHEDULED
Status: DISCONTINUED | OUTPATIENT
Start: 2018-01-01 | End: 2018-01-01

## 2018-01-01 RX ORDER — NICOTINE 21 MG/24HR
1 PATCH, TRANSDERMAL 24 HOURS TRANSDERMAL
Status: DISCONTINUED | OUTPATIENT
Start: 2018-01-01 | End: 2018-01-01 | Stop reason: HOSPADM

## 2018-01-01 RX ORDER — HYDROCODONE BITARTRATE AND ACETAMINOPHEN 10; 325 MG/1; MG/1
1 TABLET ORAL EVERY 4 HOURS PRN
Status: DISCONTINUED | OUTPATIENT
Start: 2018-01-01 | End: 2018-01-01 | Stop reason: HOSPADM

## 2018-01-01 RX ORDER — HYDROCODONE BITARTRATE AND ACETAMINOPHEN 10; 325 MG/1; MG/1
2 TABLET ORAL EVERY 4 HOURS PRN
Status: DISCONTINUED | OUTPATIENT
Start: 2018-01-01 | End: 2018-01-01 | Stop reason: HOSPADM

## 2018-01-01 RX ORDER — PANTOPRAZOLE SODIUM 40 MG/1
40 TABLET, DELAYED RELEASE ORAL DAILY
Qty: 30 TABLET | Refills: 0 | Status: SHIPPED | OUTPATIENT
Start: 2018-01-01

## 2018-01-01 RX ORDER — ONDANSETRON 2 MG/ML
4 INJECTION INTRAMUSCULAR; INTRAVENOUS EVERY 6 HOURS PRN
Status: DISCONTINUED | OUTPATIENT
Start: 2018-01-01 | End: 2018-01-01 | Stop reason: SDUPTHER

## 2018-01-01 RX ORDER — ONDANSETRON 2 MG/ML
4 INJECTION INTRAMUSCULAR; INTRAVENOUS EVERY 6 HOURS PRN
Status: CANCELLED | OUTPATIENT
Start: 2018-01-01

## 2018-01-01 RX ORDER — HYDROCODONE BITARTRATE AND ACETAMINOPHEN 7.5; 325 MG/1; MG/1
1 TABLET ORAL EVERY 4 HOURS PRN
Qty: 108 TABLET | Refills: 0 | Status: SHIPPED | OUTPATIENT
Start: 2018-01-01 | End: 2018-01-01 | Stop reason: HOSPADM

## 2018-01-01 RX ORDER — HYDROCODONE BITARTRATE AND ACETAMINOPHEN 7.5; 325 MG/1; MG/1
2 TABLET ORAL EVERY 4 HOURS PRN
Status: DISCONTINUED | OUTPATIENT
Start: 2018-01-01 | End: 2018-01-01 | Stop reason: HOSPADM

## 2018-01-01 RX ORDER — OXYCODONE AND ACETAMINOPHEN 10; 325 MG/1; MG/1
1 TABLET ORAL EVERY 6 HOURS PRN
Qty: 40 TABLET | Refills: 0 | Status: CANCELLED | OUTPATIENT
Start: 2018-01-01

## 2018-01-01 RX ORDER — PROMETHAZINE HYDROCHLORIDE 25 MG/1
25 SUPPOSITORY RECTAL EVERY 6 HOURS PRN
Qty: 12 EACH | Refills: 0 | Status: SHIPPED | OUTPATIENT
Start: 2018-01-01

## 2018-01-01 RX ORDER — OXYCODONE HCL 20 MG/1
20 TABLET, FILM COATED, EXTENDED RELEASE ORAL EVERY 12 HOURS
Status: DISCONTINUED | OUTPATIENT
Start: 2018-01-01 | End: 2018-01-01 | Stop reason: HOSPADM

## 2018-01-01 RX ORDER — LORAZEPAM 2 MG/ML
1 INJECTION INTRAMUSCULAR
Status: DISCONTINUED | OUTPATIENT
Start: 2018-01-01 | End: 2018-01-01 | Stop reason: HOSPADM

## 2018-01-01 RX ORDER — OXYCODONE HCL 20 MG/1
20 TABLET, FILM COATED, EXTENDED RELEASE ORAL EVERY 12 HOURS
Qty: 20 TABLET | Refills: 0 | Status: SHIPPED | OUTPATIENT
Start: 2018-01-01 | End: 2018-01-01 | Stop reason: SDUPTHER

## 2018-01-01 RX ORDER — SENNA AND DOCUSATE SODIUM 50; 8.6 MG/1; MG/1
2 TABLET, FILM COATED ORAL 2 TIMES DAILY
Status: DISCONTINUED | OUTPATIENT
Start: 2018-01-01 | End: 2018-01-01 | Stop reason: HOSPADM

## 2018-01-01 RX ORDER — DILTIAZEM HYDROCHLORIDE 240 MG/1
240 CAPSULE, COATED, EXTENDED RELEASE ORAL DAILY
Qty: 30 CAPSULE | Refills: 0 | Status: SHIPPED | OUTPATIENT
Start: 2018-01-01

## 2018-01-01 RX ORDER — ONDANSETRON 2 MG/ML
4 INJECTION INTRAMUSCULAR; INTRAVENOUS EVERY 6 HOURS PRN
Status: DISCONTINUED | OUTPATIENT
Start: 2018-01-01 | End: 2018-01-01

## 2018-01-01 RX ORDER — SODIUM CHLORIDE 9 MG/ML
75 INJECTION, SOLUTION INTRAVENOUS CONTINUOUS
Status: DISCONTINUED | OUTPATIENT
Start: 2018-01-01 | End: 2018-01-01

## 2018-01-01 RX ORDER — OXYCODONE HCL 20 MG/1
20 TABLET, FILM COATED, EXTENDED RELEASE ORAL EVERY 12 HOURS
Status: CANCELLED | OUTPATIENT
Start: 2018-01-01

## 2018-01-01 RX ORDER — ADENOSINE 3 MG/ML
6 INJECTION, SOLUTION INTRAVENOUS ONCE
Status: COMPLETED | OUTPATIENT
Start: 2018-01-01 | End: 2018-01-01

## 2018-01-01 RX ORDER — GLYCOPYRROLATE 0.2 MG/ML
0.2 INJECTION INTRAMUSCULAR; INTRAVENOUS EVERY 4 HOURS PRN
Status: DISCONTINUED | OUTPATIENT
Start: 2018-01-01 | End: 2018-01-01 | Stop reason: HOSPADM

## 2018-01-01 RX ORDER — SENNA AND DOCUSATE SODIUM 50; 8.6 MG/1; MG/1
2 TABLET, FILM COATED ORAL 2 TIMES DAILY PRN
Status: DISCONTINUED | OUTPATIENT
Start: 2018-01-01 | End: 2018-01-01 | Stop reason: HOSPADM

## 2018-01-01 RX ORDER — ONDANSETRON 4 MG/1
4 TABLET, ORALLY DISINTEGRATING ORAL EVERY 6 HOURS PRN
Status: DISCONTINUED | OUTPATIENT
Start: 2018-01-01 | End: 2018-01-01 | Stop reason: HOSPADM

## 2018-01-01 RX ORDER — LORAZEPAM 2 MG/ML
1 INJECTION INTRAMUSCULAR
Status: CANCELLED | OUTPATIENT
Start: 2018-01-01

## 2018-01-01 RX ORDER — FLUOXETINE HYDROCHLORIDE 20 MG/1
20 CAPSULE ORAL DAILY
Qty: 30 CAPSULE | Refills: 0 | Status: SHIPPED | OUTPATIENT
Start: 2018-01-01

## 2018-01-01 RX ORDER — LACTULOSE 10 G/15ML
30 SOLUTION ORAL ONCE
Status: COMPLETED | OUTPATIENT
Start: 2018-01-01 | End: 2018-01-01

## 2018-01-01 RX ORDER — SENNA AND DOCUSATE SODIUM 50; 8.6 MG/1; MG/1
2 TABLET, FILM COATED ORAL 2 TIMES DAILY PRN
Qty: 60 TABLET | Refills: 0 | Status: SHIPPED | OUTPATIENT
Start: 2018-01-01

## 2018-01-01 RX ORDER — HYDROCODONE BITARTRATE AND ACETAMINOPHEN 7.5; 325 MG/1; MG/1
1-2 TABLET ORAL EVERY 4 HOURS PRN
COMMUNITY

## 2018-01-01 RX ORDER — SENNA AND DOCUSATE SODIUM 50; 8.6 MG/1; MG/1
2 TABLET, FILM COATED ORAL 2 TIMES DAILY
Status: CANCELLED | OUTPATIENT
Start: 2018-01-01

## 2018-01-01 RX ORDER — SODIUM CHLORIDE 0.9 % (FLUSH) 0.9 %
10 SYRINGE (ML) INJECTION AS NEEDED
Status: DISCONTINUED | OUTPATIENT
Start: 2018-01-01 | End: 2018-01-01 | Stop reason: HOSPADM

## 2018-01-01 RX ORDER — NALOXONE HYDROCHLORIDE 1 MG/ML
0.4 INJECTION INTRAMUSCULAR; INTRAVENOUS; SUBCUTANEOUS AS NEEDED
Status: DISCONTINUED | OUTPATIENT
Start: 2018-01-01 | End: 2018-01-01 | Stop reason: HOSPADM

## 2018-01-01 RX ORDER — DEXAMETHASONE SODIUM PHOSPHATE 4 MG/ML
4 INJECTION, SOLUTION INTRA-ARTICULAR; INTRALESIONAL; INTRAMUSCULAR; INTRAVENOUS; SOFT TISSUE EVERY 8 HOURS
Status: DISCONTINUED | OUTPATIENT
Start: 2018-01-01 | End: 2018-01-01 | Stop reason: HOSPADM

## 2018-01-01 RX ORDER — DILTIAZEM HYDROCHLORIDE 240 MG/1
240 CAPSULE, COATED, EXTENDED RELEASE ORAL DAILY
Status: DISCONTINUED | OUTPATIENT
Start: 2018-01-01 | End: 2018-01-01

## 2018-01-01 RX ORDER — HYDROCODONE BITARTRATE AND ACETAMINOPHEN 5; 325 MG/1; MG/1
1 TABLET ORAL EVERY 4 HOURS PRN
Status: DISCONTINUED | OUTPATIENT
Start: 2018-01-01 | End: 2018-01-01

## 2018-01-01 RX ORDER — HYDROMORPHONE HCL 110MG/55ML
2 PATIENT CONTROLLED ANALGESIA SYRINGE INTRAVENOUS
Status: DISCONTINUED | OUTPATIENT
Start: 2018-01-01 | End: 2018-01-01 | Stop reason: HOSPADM

## 2018-01-01 RX ORDER — CHOLECALCIFEROL (VITAMIN D3) 125 MCG
1000 CAPSULE ORAL DAILY
Status: DISCONTINUED | OUTPATIENT
Start: 2018-01-01 | End: 2018-01-01 | Stop reason: HOSPADM

## 2018-01-01 RX ORDER — ADENOSINE 3 MG/ML
INJECTION, SOLUTION INTRAVENOUS
Status: COMPLETED
Start: 2018-01-01 | End: 2018-01-01

## 2018-01-01 RX ORDER — ONDANSETRON 4 MG/1
4 TABLET, ORALLY DISINTEGRATING ORAL EVERY 6 HOURS PRN
Status: CANCELLED | OUTPATIENT
Start: 2018-01-01

## 2018-01-01 RX ORDER — NALOXONE HCL 0.4 MG/ML
0.4 VIAL (ML) INJECTION
Status: DISCONTINUED | OUTPATIENT
Start: 2018-01-01 | End: 2018-01-01 | Stop reason: HOSPADM

## 2018-01-01 RX ORDER — LIDOCAINE HYDROCHLORIDE 10 MG/ML
10 INJECTION, SOLUTION INFILTRATION; PERINEURAL ONCE
Status: DISCONTINUED | OUTPATIENT
Start: 2018-01-01 | End: 2018-01-01 | Stop reason: HOSPADM

## 2018-01-01 RX ORDER — NALOXONE HYDROCHLORIDE 1 MG/ML
0.4 INJECTION INTRAMUSCULAR; INTRAVENOUS; SUBCUTANEOUS AS NEEDED
Status: CANCELLED | OUTPATIENT
Start: 2018-01-01

## 2018-01-01 RX ORDER — PANTOPRAZOLE SODIUM 40 MG/1
40 TABLET, DELAYED RELEASE ORAL DAILY
Status: CANCELLED | OUTPATIENT
Start: 2018-01-01

## 2018-01-01 RX ORDER — METOPROLOL SUCCINATE 25 MG/1
12.5 TABLET, EXTENDED RELEASE ORAL DAILY
Qty: 15 TABLET | Refills: 0 | Status: SHIPPED | OUTPATIENT
Start: 2018-01-01

## 2018-01-01 RX ORDER — HYDROMORPHONE HCL 110MG/55ML
2 PATIENT CONTROLLED ANALGESIA SYRINGE INTRAVENOUS
Status: CANCELLED | OUTPATIENT
Start: 2018-01-01

## 2018-01-01 RX ORDER — DEXTROSE MONOHYDRATE 25 G/50ML
25 INJECTION, SOLUTION INTRAVENOUS
Status: DISCONTINUED | OUTPATIENT
Start: 2018-01-01 | End: 2018-01-01 | Stop reason: HOSPADM

## 2018-01-01 RX ORDER — CHOLECALCIFEROL (VITAMIN D3) 125 MCG
1000 CAPSULE ORAL DAILY
Status: DISCONTINUED | OUTPATIENT
Start: 2018-01-01 | End: 2018-01-01

## 2018-01-01 RX ORDER — FAMOTIDINE 20 MG/1
20 TABLET, FILM COATED ORAL 2 TIMES DAILY
Qty: 60 TABLET | Refills: 0 | Status: SHIPPED | OUTPATIENT
Start: 2018-01-01

## 2018-01-01 RX ORDER — LORAZEPAM 2 MG/ML
1 INJECTION INTRAMUSCULAR EVERY 4 HOURS PRN
Status: DISCONTINUED | OUTPATIENT
Start: 2018-01-01 | End: 2018-01-01

## 2018-01-01 RX ORDER — OXYCODONE HCL 20 MG/1
20 TABLET, FILM COATED, EXTENDED RELEASE ORAL EVERY 12 HOURS
Qty: 60 TABLET | Refills: 0 | Status: SHIPPED | OUTPATIENT
Start: 2018-01-01

## 2018-01-01 RX ORDER — LORAZEPAM 2 MG/ML
1 INJECTION INTRAMUSCULAR EVERY 6 HOURS PRN
Status: DISCONTINUED | OUTPATIENT
Start: 2018-01-01 | End: 2018-01-01 | Stop reason: HOSPADM

## 2018-01-01 RX ORDER — HYDROCODONE BITARTRATE AND ACETAMINOPHEN 10; 325 MG/1; MG/1
1-2 TABLET ORAL EVERY 4 HOURS PRN
Qty: 36 TABLET | Refills: 0 | Status: ON HOLD | OUTPATIENT
Start: 2018-01-01 | End: 2018-01-01

## 2018-01-01 RX ORDER — TC 99M MEDRONATE 20 MG/10ML
21.3 INJECTION, POWDER, LYOPHILIZED, FOR SOLUTION INTRAVENOUS
Status: COMPLETED | OUTPATIENT
Start: 2018-01-01 | End: 2018-01-01

## 2018-01-01 RX ORDER — METOPROLOL SUCCINATE 25 MG/1
12.5 TABLET, EXTENDED RELEASE ORAL DAILY
Qty: 15 TABLET | Refills: 0 | Status: SHIPPED | OUTPATIENT
Start: 2018-01-01 | End: 2018-01-01

## 2018-01-01 RX ORDER — MORPHINE SULFATE 2 MG/ML
2 INJECTION, SOLUTION INTRAMUSCULAR; INTRAVENOUS ONCE
Status: COMPLETED | OUTPATIENT
Start: 2018-01-01 | End: 2018-01-01

## 2018-01-01 RX ORDER — ALPRAZOLAM 0.5 MG/1
0.5 TABLET ORAL 2 TIMES DAILY PRN
Qty: 40 TABLET | Refills: 0 | Status: SHIPPED | OUTPATIENT
Start: 2018-01-01

## 2018-01-01 RX ORDER — FAMOTIDINE 20 MG/1
20 TABLET, FILM COATED ORAL 2 TIMES DAILY
Status: CANCELLED | OUTPATIENT
Start: 2018-01-01

## 2018-01-01 RX ORDER — SENNA AND DOCUSATE SODIUM 50; 8.6 MG/1; MG/1
2 TABLET, FILM COATED ORAL 2 TIMES DAILY PRN
Status: DISCONTINUED | OUTPATIENT
Start: 2018-01-01 | End: 2018-01-01

## 2018-01-01 RX ORDER — LEVETIRACETAM 500 MG/1
500 TABLET ORAL EVERY 12 HOURS SCHEDULED
Status: CANCELLED | OUTPATIENT
Start: 2018-01-01

## 2018-01-01 RX ORDER — HYDROCODONE BITARTRATE AND ACETAMINOPHEN 7.5; 325 MG/1; MG/1
1 TABLET ORAL EVERY 4 HOURS PRN
Qty: 20 TABLET | Refills: 0 | Status: SHIPPED | OUTPATIENT
Start: 2018-01-01 | End: 2018-01-01 | Stop reason: SDUPTHER

## 2018-01-01 RX ORDER — MORPHINE SULFATE 2 MG/ML
INJECTION, SOLUTION INTRAMUSCULAR; INTRAVENOUS
Status: COMPLETED
Start: 2018-01-01 | End: 2018-01-01

## 2018-01-01 RX ORDER — HYDROMORPHONE HCL 110MG/55ML
2 PATIENT CONTROLLED ANALGESIA SYRINGE INTRAVENOUS
Status: DISCONTINUED | OUTPATIENT
Start: 2018-01-01 | End: 2018-01-01

## 2018-01-01 RX ORDER — METOPROLOL SUCCINATE 25 MG/1
12.5 TABLET, EXTENDED RELEASE ORAL DAILY
Status: DISCONTINUED | OUTPATIENT
Start: 2018-01-01 | End: 2018-01-01

## 2018-01-01 RX ORDER — PROMETHAZINE HYDROCHLORIDE 6.25 MG/5ML
12.5 SYRUP ORAL EVERY 6 HOURS PRN
Status: DISCONTINUED | OUTPATIENT
Start: 2018-01-01 | End: 2018-01-01 | Stop reason: HOSPADM

## 2018-01-01 RX ORDER — ACETAMINOPHEN 325 MG/1
650 TABLET ORAL EVERY 6 HOURS PRN
Status: DISCONTINUED | OUTPATIENT
Start: 2018-01-01 | End: 2018-01-01 | Stop reason: HOSPADM

## 2018-01-01 RX ORDER — ONDANSETRON 4 MG/1
4 TABLET, FILM COATED ORAL EVERY 6 HOURS PRN
Status: CANCELLED | OUTPATIENT
Start: 2018-01-01

## 2018-01-01 RX ORDER — OXYCODONE HYDROCHLORIDE AND ACETAMINOPHEN 5; 325 MG/1; MG/1
2 TABLET ORAL ONCE
Status: COMPLETED | OUTPATIENT
Start: 2018-01-01 | End: 2018-01-01

## 2018-01-01 RX ORDER — FLUOXETINE HYDROCHLORIDE 20 MG/1
20 CAPSULE ORAL DAILY
Status: DISCONTINUED | OUTPATIENT
Start: 2018-01-01 | End: 2018-01-01

## 2018-01-01 RX ORDER — SODIUM CHLORIDE 0.9 % (FLUSH) 0.9 %
1-10 SYRINGE (ML) INJECTION AS NEEDED
Status: CANCELLED | OUTPATIENT
Start: 2018-01-01

## 2018-01-01 RX ORDER — PANTOPRAZOLE SODIUM 40 MG/1
40 TABLET, DELAYED RELEASE ORAL
Status: DISCONTINUED | OUTPATIENT
Start: 2018-01-01 | End: 2018-01-01 | Stop reason: HOSPADM

## 2018-01-01 RX ORDER — OXYCODONE AND ACETAMINOPHEN 10; 325 MG/1; MG/1
1-2 TABLET ORAL EVERY 4 HOURS PRN
Qty: 180 TABLET | Refills: 0 | Status: SHIPPED | OUTPATIENT
Start: 2018-01-01

## 2018-01-01 RX ORDER — HYDROCODONE BITARTRATE AND ACETAMINOPHEN 10; 325 MG/1; MG/1
1 TABLET ORAL EVERY 4 HOURS PRN
Status: CANCELLED | OUTPATIENT
Start: 2018-01-01 | End: 2018-10-09

## 2018-01-01 RX ORDER — BISACODYL 10 MG
10 SUPPOSITORY, RECTAL RECTAL DAILY PRN
Status: CANCELLED | OUTPATIENT
Start: 2018-01-01

## 2018-01-01 RX ORDER — DOCUSATE SODIUM 100 MG/1
100 CAPSULE, LIQUID FILLED ORAL 2 TIMES DAILY
Status: DISCONTINUED | OUTPATIENT
Start: 2018-01-01 | End: 2018-01-01

## 2018-01-01 RX ORDER — DEXAMETHASONE 4 MG/1
4 TABLET ORAL EVERY 6 HOURS SCHEDULED
Status: DISCONTINUED | OUTPATIENT
Start: 2018-01-01 | End: 2018-01-01 | Stop reason: HOSPADM

## 2018-01-01 RX ORDER — SODIUM CHLORIDE 9 MG/ML
100 INJECTION, SOLUTION INTRAVENOUS CONTINUOUS
Status: DISCONTINUED | OUTPATIENT
Start: 2018-01-01 | End: 2018-01-01 | Stop reason: HOSPADM

## 2018-01-01 RX ORDER — DILTIAZEM HYDROCHLORIDE 5 MG/ML
20 INJECTION INTRAVENOUS ONCE
Status: COMPLETED | OUTPATIENT
Start: 2018-01-01 | End: 2018-01-01

## 2018-01-01 RX ADMIN — INSULIN ASPART 2 UNITS: 100 INJECTION, SOLUTION INTRAVENOUS; SUBCUTANEOUS at 21:48

## 2018-01-01 RX ADMIN — DEXAMETHASONE 4 MG: 4 TABLET ORAL at 18:10

## 2018-01-01 RX ADMIN — HYDROMORPHONE HYDROCHLORIDE 2 MG: 2 INJECTION INTRAMUSCULAR; INTRAVENOUS; SUBCUTANEOUS at 12:43

## 2018-01-01 RX ADMIN — HYDROCODONE BITARTRATE AND ACETAMINOPHEN 2 TABLET: 10; 325 TABLET ORAL at 02:57

## 2018-01-01 RX ADMIN — NICOTINE 1 PATCH: 14 PATCH, EXTENDED RELEASE TRANSDERMAL at 16:00

## 2018-01-01 RX ADMIN — HYDROMORPHONE HYDROCHLORIDE 1 MG: 1 INJECTION, SOLUTION INTRAMUSCULAR; INTRAVENOUS; SUBCUTANEOUS at 09:45

## 2018-01-01 RX ADMIN — HYDROCODONE BITARTRATE AND ACETAMINOPHEN 2 TABLET: 7.5; 325 TABLET ORAL at 20:56

## 2018-01-01 RX ADMIN — IPRATROPIUM BROMIDE AND ALBUTEROL SULFATE 3 ML: .5; 3 SOLUTION RESPIRATORY (INHALATION) at 07:47

## 2018-01-01 RX ADMIN — IPRATROPIUM BROMIDE AND ALBUTEROL SULFATE 3 ML: .5; 3 SOLUTION RESPIRATORY (INHALATION) at 04:18

## 2018-01-01 RX ADMIN — HYDROCODONE BITARTRATE AND ACETAMINOPHEN 2 TABLET: 10; 325 TABLET ORAL at 12:27

## 2018-01-01 RX ADMIN — ENOXAPARIN SODIUM 40 MG: 40 INJECTION SUBCUTANEOUS at 09:31

## 2018-01-01 RX ADMIN — HYDROMORPHONE HYDROCHLORIDE 2 MG: 1 INJECTION, SOLUTION INTRAMUSCULAR; INTRAVENOUS; SUBCUTANEOUS at 20:44

## 2018-01-01 RX ADMIN — PANTOPRAZOLE SODIUM 40 MG: 40 TABLET, DELAYED RELEASE ORAL at 05:21

## 2018-01-01 RX ADMIN — DOCUSATE SODIUM -SENNOSIDES 2 TABLET: 50; 8.6 TABLET, COATED ORAL at 13:30

## 2018-01-01 RX ADMIN — SODIUM CHLORIDE 100 ML/HR: 9 INJECTION, SOLUTION INTRAVENOUS at 08:54

## 2018-01-01 RX ADMIN — PANTOPRAZOLE SODIUM 40 MG: 40 TABLET, DELAYED RELEASE ORAL at 05:26

## 2018-01-01 RX ADMIN — FAMOTIDINE 20 MG: 20 TABLET, FILM COATED ORAL at 11:46

## 2018-01-01 RX ADMIN — HYDROMORPHONE HYDROCHLORIDE 0.5 MG: 1 INJECTION, SOLUTION INTRAMUSCULAR; INTRAVENOUS; SUBCUTANEOUS at 23:43

## 2018-01-01 RX ADMIN — LEVETIRACETAM 500 MG: 500 TABLET, FILM COATED ORAL at 20:42

## 2018-01-01 RX ADMIN — HYDROCODONE BITARTRATE AND ACETAMINOPHEN 1 TABLET: 10; 325 TABLET ORAL at 02:38

## 2018-01-01 RX ADMIN — DEXAMETHASONE 4 MG: 4 TABLET ORAL at 00:13

## 2018-01-01 RX ADMIN — FLUOXETINE HYDROCHLORIDE 20 MG: 20 CAPSULE ORAL at 11:46

## 2018-01-01 RX ADMIN — HYDROMORPHONE HYDROCHLORIDE 0.5 MG: 1 INJECTION, SOLUTION INTRAMUSCULAR; INTRAVENOUS; SUBCUTANEOUS at 00:33

## 2018-01-01 RX ADMIN — DEXAMETHASONE 4 MG: 4 TABLET ORAL at 06:47

## 2018-01-01 RX ADMIN — LORAZEPAM 1 MG: 2 INJECTION INTRAMUSCULAR; INTRAVENOUS at 17:24

## 2018-01-01 RX ADMIN — HYDROMORPHONE HYDROCHLORIDE 0.5 MG: 1 INJECTION, SOLUTION INTRAMUSCULAR; INTRAVENOUS; SUBCUTANEOUS at 15:14

## 2018-01-01 RX ADMIN — DILTIAZEM HYDROCHLORIDE 240 MG: 240 CAPSULE, COATED, EXTENDED RELEASE ORAL at 08:50

## 2018-01-01 RX ADMIN — HYDROCODONE BITARTRATE AND ACETAMINOPHEN 2 TABLET: 10; 325 TABLET ORAL at 10:56

## 2018-01-01 RX ADMIN — HYDROCODONE BITARTRATE AND ACETAMINOPHEN 2 TABLET: 7.5; 325 TABLET ORAL at 17:58

## 2018-01-01 RX ADMIN — HYDROMORPHONE HYDROCHLORIDE 0.5 MG: 1 INJECTION, SOLUTION INTRAMUSCULAR; INTRAVENOUS; SUBCUTANEOUS at 13:28

## 2018-01-01 RX ADMIN — HYDROMORPHONE HYDROCHLORIDE 2 MG: 1 INJECTION, SOLUTION INTRAMUSCULAR; INTRAVENOUS; SUBCUTANEOUS at 04:59

## 2018-01-01 RX ADMIN — FAMOTIDINE 20 MG: 20 TABLET, FILM COATED ORAL at 11:44

## 2018-01-01 RX ADMIN — DEXAMETHASONE 4 MG: 4 TABLET ORAL at 11:46

## 2018-01-01 RX ADMIN — DILTIAZEM HYDROCHLORIDE 240 MG: 240 CAPSULE, COATED, EXTENDED RELEASE ORAL at 08:05

## 2018-01-01 RX ADMIN — IPRATROPIUM BROMIDE AND ALBUTEROL SULFATE 3 ML: .5; 3 SOLUTION RESPIRATORY (INHALATION) at 20:33

## 2018-01-01 RX ADMIN — LEVETIRACETAM 500 MG: 500 TABLET, FILM COATED ORAL at 20:28

## 2018-01-01 RX ADMIN — MORPHINE SULFATE 2 MG: 2 INJECTION, SOLUTION INTRAMUSCULAR; INTRAVENOUS at 01:53

## 2018-01-01 RX ADMIN — FAMOTIDINE 20 MG: 20 TABLET, FILM COATED ORAL at 10:27

## 2018-01-01 RX ADMIN — HYDROCODONE BITARTRATE AND ACETAMINOPHEN 2 TABLET: 7.5; 325 TABLET ORAL at 13:55

## 2018-01-01 RX ADMIN — LORAZEPAM 1 MG: 2 INJECTION INTRAMUSCULAR; INTRAVENOUS at 14:48

## 2018-01-01 RX ADMIN — DEXAMETHASONE 4 MG: 4 TABLET ORAL at 23:09

## 2018-01-01 RX ADMIN — HYDROMORPHONE HYDROCHLORIDE 0.5 MG: 1 INJECTION, SOLUTION INTRAMUSCULAR; INTRAVENOUS; SUBCUTANEOUS at 22:39

## 2018-01-01 RX ADMIN — PANTOPRAZOLE SODIUM 40 MG: 40 TABLET, DELAYED RELEASE ORAL at 10:44

## 2018-01-01 RX ADMIN — HYDROCODONE BITARTRATE AND ACETAMINOPHEN 2 TABLET: 10; 325 TABLET ORAL at 15:41

## 2018-01-01 RX ADMIN — SODIUM CHLORIDE 100 ML/HR: 9 INJECTION, SOLUTION INTRAVENOUS at 21:01

## 2018-01-01 RX ADMIN — DEXAMETHASONE 4 MG: 4 TABLET ORAL at 18:50

## 2018-01-01 RX ADMIN — IPRATROPIUM BROMIDE AND ALBUTEROL SULFATE 3 ML: .5; 3 SOLUTION RESPIRATORY (INHALATION) at 07:26

## 2018-01-01 RX ADMIN — Medication 21.3 MILLICURIE: at 06:25

## 2018-01-01 RX ADMIN — OXYCODONE HYDROCHLORIDE 20 MG: 20 TABLET, FILM COATED, EXTENDED RELEASE ORAL at 00:02

## 2018-01-01 RX ADMIN — SODIUM CHLORIDE 100 ML/HR: 9 INJECTION, SOLUTION INTRAVENOUS at 18:11

## 2018-01-01 RX ADMIN — DEXAMETHASONE SODIUM PHOSPHATE 4 MG: 4 INJECTION, SOLUTION INTRAMUSCULAR; INTRAVENOUS at 02:36

## 2018-01-01 RX ADMIN — MORPHINE SULFATE 2 MG: 2 INJECTION, SOLUTION INTRAMUSCULAR; INTRAVENOUS at 00:11

## 2018-01-01 RX ADMIN — FAMOTIDINE 20 MG: 20 TABLET, FILM COATED ORAL at 10:09

## 2018-01-01 RX ADMIN — PANTOPRAZOLE SODIUM 40 MG: 40 TABLET, DELAYED RELEASE ORAL at 06:49

## 2018-01-01 RX ADMIN — HYDROMORPHONE HYDROCHLORIDE 0.5 MG: 1 INJECTION, SOLUTION INTRAMUSCULAR; INTRAVENOUS; SUBCUTANEOUS at 16:51

## 2018-01-01 RX ADMIN — FAMOTIDINE 20 MG: 20 TABLET, FILM COATED ORAL at 20:28

## 2018-01-01 RX ADMIN — HYDROCODONE BITARTRATE AND ACETAMINOPHEN 1 TABLET: 10; 325 TABLET ORAL at 20:46

## 2018-01-01 RX ADMIN — IPRATROPIUM BROMIDE AND ALBUTEROL SULFATE 3 ML: .5; 3 SOLUTION RESPIRATORY (INHALATION) at 23:53

## 2018-01-01 RX ADMIN — SODIUM CHLORIDE 100 ML/HR: 9 INJECTION, SOLUTION INTRAVENOUS at 05:06

## 2018-01-01 RX ADMIN — HYDROCODONE BITARTRATE AND ACETAMINOPHEN 2 TABLET: 10; 325 TABLET ORAL at 02:45

## 2018-01-01 RX ADMIN — FAMOTIDINE 20 MG: 20 TABLET, FILM COATED ORAL at 21:48

## 2018-01-01 RX ADMIN — ALPRAZOLAM 0.5 MG: 0.5 TABLET ORAL at 13:28

## 2018-01-01 RX ADMIN — DILTIAZEM HYDROCHLORIDE 240 MG: 240 CAPSULE, COATED, EXTENDED RELEASE ORAL at 08:24

## 2018-01-01 RX ADMIN — DEXAMETHASONE 4 MG: 4 TABLET ORAL at 18:24

## 2018-01-01 RX ADMIN — HYDROCODONE BITARTRATE AND ACETAMINOPHEN 2 TABLET: 10; 325 TABLET ORAL at 09:20

## 2018-01-01 RX ADMIN — LEVETIRACETAM 500 MG: 500 TABLET, FILM COATED ORAL at 10:28

## 2018-01-01 RX ADMIN — HYDROCODONE BITARTRATE AND ACETAMINOPHEN 2 TABLET: 7.5; 325 TABLET ORAL at 12:26

## 2018-01-01 RX ADMIN — HYDROCODONE BITARTRATE AND ACETAMINOPHEN 2 TABLET: 10; 325 TABLET ORAL at 16:47

## 2018-01-01 RX ADMIN — HYDROMORPHONE HYDROCHLORIDE 2 MG: 1 INJECTION, SOLUTION INTRAMUSCULAR; INTRAVENOUS; SUBCUTANEOUS at 12:17

## 2018-01-01 RX ADMIN — NICOTINE 1 PATCH: 14 PATCH, EXTENDED RELEASE TRANSDERMAL at 15:21

## 2018-01-01 RX ADMIN — HYDROMORPHONE HYDROCHLORIDE 2 MG: 2 INJECTION INTRAMUSCULAR; INTRAVENOUS; SUBCUTANEOUS at 22:48

## 2018-01-01 RX ADMIN — DEXAMETHASONE 4 MG: 4 TABLET ORAL at 11:43

## 2018-01-01 RX ADMIN — HYDROMORPHONE HYDROCHLORIDE 0.5 MG: 1 INJECTION, SOLUTION INTRAMUSCULAR; INTRAVENOUS; SUBCUTANEOUS at 02:38

## 2018-01-01 RX ADMIN — DEXAMETHASONE SODIUM PHOSPHATE 4 MG: 4 INJECTION, SOLUTION INTRAMUSCULAR; INTRAVENOUS at 19:27

## 2018-01-01 RX ADMIN — HYDROCODONE BITARTRATE AND ACETAMINOPHEN 2 TABLET: 10; 325 TABLET ORAL at 15:04

## 2018-01-01 RX ADMIN — GADOBENATE DIMEGLUMINE 13 ML: 529 INJECTION, SOLUTION INTRAVENOUS at 23:00

## 2018-01-01 RX ADMIN — DEXAMETHASONE SODIUM PHOSPHATE 4 MG: 4 INJECTION, SOLUTION INTRAMUSCULAR; INTRAVENOUS at 12:43

## 2018-01-01 RX ADMIN — ONDANSETRON 4 MG: 2 INJECTION INTRAMUSCULAR; INTRAVENOUS at 09:12

## 2018-01-01 RX ADMIN — DEXAMETHASONE SODIUM PHOSPHATE 4 MG: 4 INJECTION, SOLUTION INTRAMUSCULAR; INTRAVENOUS at 10:12

## 2018-01-01 RX ADMIN — DEXAMETHASONE 4 MG: 4 TABLET ORAL at 06:37

## 2018-01-01 RX ADMIN — FAMOTIDINE 20 MG: 20 TABLET, FILM COATED ORAL at 08:51

## 2018-01-01 RX ADMIN — INSULIN ASPART 2 UNITS: 100 INJECTION, SOLUTION INTRAVENOUS; SUBCUTANEOUS at 21:00

## 2018-01-01 RX ADMIN — IPRATROPIUM BROMIDE AND ALBUTEROL SULFATE 3 ML: .5; 3 SOLUTION RESPIRATORY (INHALATION) at 20:09

## 2018-01-01 RX ADMIN — PANTOPRAZOLE SODIUM 40 MG: 40 TABLET, DELAYED RELEASE ORAL at 15:40

## 2018-01-01 RX ADMIN — HYDROMORPHONE HYDROCHLORIDE 0.5 MG: 1 INJECTION, SOLUTION INTRAMUSCULAR; INTRAVENOUS; SUBCUTANEOUS at 04:34

## 2018-01-01 RX ADMIN — LEVETIRACETAM 500 MG: 500 TABLET, FILM COATED ORAL at 08:51

## 2018-01-01 RX ADMIN — ZOLEDRONIC ACID 4 MG: 4 INJECTION, SOLUTION, CONCENTRATE INTRAVENOUS at 23:14

## 2018-01-01 RX ADMIN — LEVETIRACETAM 500 MG: 500 TABLET, FILM COATED ORAL at 08:50

## 2018-01-01 RX ADMIN — LORAZEPAM 1 MG: 2 INJECTION INTRAMUSCULAR; INTRAVENOUS at 12:18

## 2018-01-01 RX ADMIN — LORAZEPAM 1 MG: 2 INJECTION INTRAMUSCULAR; INTRAVENOUS at 22:48

## 2018-01-01 RX ADMIN — HYDROCODONE BITARTRATE AND ACETAMINOPHEN 1 TABLET: 10; 325 TABLET ORAL at 21:29

## 2018-01-01 RX ADMIN — DEXAMETHASONE 4 MG: 4 TABLET ORAL at 12:15

## 2018-01-01 RX ADMIN — Medication 2 TABLET: at 08:21

## 2018-01-01 RX ADMIN — POLYETHYLENE GLYCOL 3350 17 G: 17 POWDER, FOR SOLUTION ORAL at 09:32

## 2018-01-01 RX ADMIN — HYDROCODONE BITARTRATE AND ACETAMINOPHEN 1 TABLET: 10; 325 TABLET ORAL at 12:13

## 2018-01-01 RX ADMIN — Medication 1000 MCG: at 10:28

## 2018-01-01 RX ADMIN — HYDROMORPHONE HYDROCHLORIDE 2 MG: 1 INJECTION, SOLUTION INTRAMUSCULAR; INTRAVENOUS; SUBCUTANEOUS at 14:48

## 2018-01-01 RX ADMIN — HYDROCODONE BITARTRATE AND ACETAMINOPHEN 1 TABLET: 10; 325 TABLET ORAL at 16:52

## 2018-01-01 RX ADMIN — NICOTINE 1 PATCH: 14 PATCH, EXTENDED RELEASE TRANSDERMAL at 15:05

## 2018-01-01 RX ADMIN — OXYCODONE HYDROCHLORIDE 20 MG: 20 TABLET, FILM COATED, EXTENDED RELEASE ORAL at 10:41

## 2018-01-01 RX ADMIN — LORAZEPAM 1 MG: 2 INJECTION INTRAMUSCULAR; INTRAVENOUS at 19:25

## 2018-01-01 RX ADMIN — SODIUM CHLORIDE 125 ML/HR: 9 INJECTION, SOLUTION INTRAVENOUS at 00:15

## 2018-01-01 RX ADMIN — DEXAMETHASONE 4 MG: 4 TABLET ORAL at 12:27

## 2018-01-01 RX ADMIN — IPRATROPIUM BROMIDE AND ALBUTEROL SULFATE 3 ML: .5; 3 SOLUTION RESPIRATORY (INHALATION) at 03:44

## 2018-01-01 RX ADMIN — HYDROCODONE BITARTRATE AND ACETAMINOPHEN 2 TABLET: 7.5; 325 TABLET ORAL at 02:18

## 2018-01-01 RX ADMIN — DILTIAZEM HYDROCHLORIDE 240 MG: 240 CAPSULE, COATED, EXTENDED RELEASE ORAL at 14:47

## 2018-01-01 RX ADMIN — IPRATROPIUM BROMIDE AND ALBUTEROL SULFATE 3 ML: .5; 3 SOLUTION RESPIRATORY (INHALATION) at 15:40

## 2018-01-01 RX ADMIN — IOPAMIDOL 85 ML: 612 INJECTION, SOLUTION INTRAVENOUS at 09:11

## 2018-01-01 RX ADMIN — LEVETIRACETAM 500 MG: 500 TABLET, FILM COATED ORAL at 21:48

## 2018-01-01 RX ADMIN — SODIUM CHLORIDE 10 MG/HR: 900 INJECTION, SOLUTION INTRAVENOUS at 02:24

## 2018-01-01 RX ADMIN — Medication 1000 MCG: at 08:50

## 2018-01-01 RX ADMIN — DEXAMETHASONE 4 MG: 4 TABLET ORAL at 12:18

## 2018-01-01 RX ADMIN — HYDROCODONE BITARTRATE AND ACETAMINOPHEN 2 TABLET: 10; 325 TABLET ORAL at 23:33

## 2018-01-01 RX ADMIN — LIDOCAINE HYDROCHLORIDE 20 ML: 10 INJECTION, SOLUTION INFILTRATION; PERINEURAL at 14:15

## 2018-01-01 RX ADMIN — LEVETIRACETAM 500 MG: 500 TABLET, FILM COATED ORAL at 10:42

## 2018-01-01 RX ADMIN — METOPROLOL SUCCINATE 12.5 MG: 25 TABLET, FILM COATED, EXTENDED RELEASE ORAL at 16:00

## 2018-01-01 RX ADMIN — METOPROLOL TARTRATE 5 MG: 5 INJECTION, SOLUTION INTRAVENOUS at 23:25

## 2018-01-01 RX ADMIN — DEXAMETHASONE 4 MG: 4 TABLET ORAL at 17:17

## 2018-01-01 RX ADMIN — DEXAMETHASONE 4 MG: 4 TABLET ORAL at 12:13

## 2018-01-01 RX ADMIN — HYDROCODONE BITARTRATE AND ACETAMINOPHEN 1 TABLET: 7.5; 325 TABLET ORAL at 20:43

## 2018-01-01 RX ADMIN — ADENOSINE 6 MG: 3 INJECTION, SOLUTION INTRAVENOUS at 04:49

## 2018-01-01 RX ADMIN — HYDROCODONE BITARTRATE AND ACETAMINOPHEN 1 TABLET: 10; 325 TABLET ORAL at 01:29

## 2018-01-01 RX ADMIN — PANTOPRAZOLE SODIUM 40 MG: 40 TABLET, DELAYED RELEASE ORAL at 09:21

## 2018-01-01 RX ADMIN — METOPROLOL SUCCINATE 12.5 MG: 25 TABLET, FILM COATED, EXTENDED RELEASE ORAL at 18:24

## 2018-01-01 RX ADMIN — DILTIAZEM HYDROCHLORIDE 240 MG: 240 CAPSULE, COATED, EXTENDED RELEASE ORAL at 10:08

## 2018-01-01 RX ADMIN — HYDROCODONE BITARTRATE AND ACETAMINOPHEN 2 TABLET: 10; 325 TABLET ORAL at 03:44

## 2018-01-01 RX ADMIN — HYDROCODONE BITARTRATE AND ACETAMINOPHEN 2 TABLET: 7.5; 325 TABLET ORAL at 16:34

## 2018-01-01 RX ADMIN — HYDROMORPHONE HYDROCHLORIDE 2 MG: 2 INJECTION INTRAMUSCULAR; INTRAVENOUS; SUBCUTANEOUS at 17:24

## 2018-01-01 RX ADMIN — HYDROMORPHONE HYDROCHLORIDE 0.5 MG: 1 INJECTION, SOLUTION INTRAMUSCULAR; INTRAVENOUS; SUBCUTANEOUS at 22:19

## 2018-01-01 RX ADMIN — OXYCODONE HYDROCHLORIDE AND ACETAMINOPHEN 2 TABLET: 5; 325 TABLET ORAL at 22:27

## 2018-01-01 RX ADMIN — HYDROCODONE BITARTRATE AND ACETAMINOPHEN 2 TABLET: 10; 325 TABLET ORAL at 09:02

## 2018-01-01 RX ADMIN — SODIUM CHLORIDE 1000 ML: 9 INJECTION, SOLUTION INTRAVENOUS at 20:42

## 2018-01-01 RX ADMIN — SODIUM CHLORIDE 100 ML/HR: 9 INJECTION, SOLUTION INTRAVENOUS at 23:15

## 2018-01-01 RX ADMIN — SODIUM CHLORIDE 125 ML/HR: 9 INJECTION, SOLUTION INTRAVENOUS at 16:32

## 2018-01-01 RX ADMIN — DILTIAZEM HYDROCHLORIDE 240 MG: 240 CAPSULE, COATED, EXTENDED RELEASE ORAL at 08:20

## 2018-01-01 RX ADMIN — LORAZEPAM 1 MG: 2 INJECTION INTRAMUSCULAR; INTRAVENOUS at 17:07

## 2018-01-01 RX ADMIN — LEVETIRACETAM 500 MG: 500 TABLET, FILM COATED ORAL at 21:30

## 2018-01-01 RX ADMIN — METOPROLOL SUCCINATE 12.5 MG: 25 TABLET, FILM COATED, EXTENDED RELEASE ORAL at 10:08

## 2018-01-01 RX ADMIN — LORAZEPAM 1 MG: 2 INJECTION INTRAMUSCULAR; INTRAVENOUS at 02:32

## 2018-01-01 RX ADMIN — GLYCOPYRROLATE 0.2 MG: 0.2 INJECTION, SOLUTION INTRAMUSCULAR; INTRAVENOUS at 00:42

## 2018-01-01 RX ADMIN — HYDROCODONE BITARTRATE AND ACETAMINOPHEN 2 TABLET: 7.5; 325 TABLET ORAL at 05:01

## 2018-01-01 RX ADMIN — POLYETHYLENE GLYCOL 3350 17 G: 17 POWDER, FOR SOLUTION ORAL at 08:51

## 2018-01-01 RX ADMIN — HYDROCODONE BITARTRATE AND ACETAMINOPHEN 2 TABLET: 7.5; 325 TABLET ORAL at 00:55

## 2018-01-01 RX ADMIN — PANTOPRAZOLE SODIUM 40 MG: 40 TABLET, DELAYED RELEASE ORAL at 11:46

## 2018-01-01 RX ADMIN — SODIUM CHLORIDE 100 ML/HR: 9 INJECTION, SOLUTION INTRAVENOUS at 01:30

## 2018-01-01 RX ADMIN — FAMOTIDINE 20 MG: 20 TABLET, FILM COATED ORAL at 08:50

## 2018-01-01 RX ADMIN — DEXAMETHASONE 4 MG: 4 TABLET ORAL at 05:21

## 2018-01-01 RX ADMIN — HYDROCODONE BITARTRATE AND ACETAMINOPHEN 2 TABLET: 10; 325 TABLET ORAL at 19:09

## 2018-01-01 RX ADMIN — LIDOCAINE HYDROCHLORIDE 20 ML: 10 INJECTION, SOLUTION INFILTRATION; PERINEURAL at 11:15

## 2018-01-01 RX ADMIN — HYDROCODONE BITARTRATE AND ACETAMINOPHEN 2 TABLET: 10; 325 TABLET ORAL at 11:18

## 2018-01-01 RX ADMIN — DEXAMETHASONE SODIUM PHOSPHATE 4 MG: 4 INJECTION, SOLUTION INTRAMUSCULAR; INTRAVENOUS at 18:39

## 2018-01-01 RX ADMIN — HYDROMORPHONE HYDROCHLORIDE 0.5 MG: 1 INJECTION, SOLUTION INTRAMUSCULAR; INTRAVENOUS; SUBCUTANEOUS at 08:45

## 2018-01-01 RX ADMIN — HYDROMORPHONE HYDROCHLORIDE 2 MG: 2 INJECTION INTRAMUSCULAR; INTRAVENOUS; SUBCUTANEOUS at 09:23

## 2018-01-01 RX ADMIN — LEVETIRACETAM 500 MG: 500 TABLET, FILM COATED ORAL at 10:09

## 2018-01-01 RX ADMIN — ADENOSINE 6 MG: 3 INJECTION, SOLUTION INTRAVENOUS at 21:17

## 2018-01-01 RX ADMIN — LORAZEPAM 1 MG: 2 INJECTION INTRAMUSCULAR; INTRAVENOUS at 09:23

## 2018-01-01 RX ADMIN — SODIUM CHLORIDE 5 MG/HR: 900 INJECTION, SOLUTION INTRAVENOUS at 21:24

## 2018-01-01 RX ADMIN — IOPAMIDOL 85 ML: 612 INJECTION, SOLUTION INTRAVENOUS at 21:56

## 2018-01-01 RX ADMIN — HYDROCODONE BITARTRATE AND ACETAMINOPHEN 2 TABLET: 10; 325 TABLET ORAL at 07:18

## 2018-01-01 RX ADMIN — HYDROCODONE BITARTRATE AND ACETAMINOPHEN 2 TABLET: 7.5; 325 TABLET ORAL at 08:29

## 2018-01-01 RX ADMIN — DILTIAZEM HYDROCHLORIDE 20 MG: 5 INJECTION, SOLUTION INTRAVENOUS at 22:24

## 2018-01-01 RX ADMIN — HYDROCODONE BITARTRATE AND ACETAMINOPHEN 2 TABLET: 7.5; 325 TABLET ORAL at 09:55

## 2018-01-01 RX ADMIN — ONDANSETRON 4 MG: 2 INJECTION INTRAMUSCULAR; INTRAVENOUS at 00:11

## 2018-01-01 RX ADMIN — HYDROMORPHONE HYDROCHLORIDE 0.5 MG: 1 INJECTION, SOLUTION INTRAMUSCULAR; INTRAVENOUS; SUBCUTANEOUS at 11:47

## 2018-01-01 RX ADMIN — OXYCODONE HYDROCHLORIDE 20 MG: 20 TABLET, FILM COATED, EXTENDED RELEASE ORAL at 11:46

## 2018-01-01 RX ADMIN — FAMOTIDINE 20 MG: 20 TABLET, FILM COATED ORAL at 20:42

## 2018-01-01 RX ADMIN — HYDROMORPHONE HYDROCHLORIDE 2 MG: 1 INJECTION, SOLUTION INTRAMUSCULAR; INTRAVENOUS; SUBCUTANEOUS at 00:42

## 2018-01-01 RX ADMIN — IPRATROPIUM BROMIDE AND ALBUTEROL SULFATE 3 ML: .5; 3 SOLUTION RESPIRATORY (INHALATION) at 23:47

## 2018-01-01 RX ADMIN — HYDROCODONE BITARTRATE AND ACETAMINOPHEN 2 TABLET: 10; 325 TABLET ORAL at 15:17

## 2018-01-01 RX ADMIN — HYDROMORPHONE HYDROCHLORIDE 2 MG: 2 INJECTION INTRAMUSCULAR; INTRAVENOUS; SUBCUTANEOUS at 19:25

## 2018-01-01 RX ADMIN — FLUOXETINE HYDROCHLORIDE 20 MG: 20 CAPSULE ORAL at 10:44

## 2018-01-01 RX ADMIN — DEXAMETHASONE 4 MG: 4 TABLET ORAL at 05:49

## 2018-01-01 RX ADMIN — DEXAMETHASONE 4 MG: 4 TABLET ORAL at 18:31

## 2018-01-01 RX ADMIN — OXYCODONE HYDROCHLORIDE 20 MG: 20 TABLET, FILM COATED, EXTENDED RELEASE ORAL at 22:59

## 2018-01-01 RX ADMIN — FAMOTIDINE 20 MG: 20 TABLET, FILM COATED ORAL at 20:14

## 2018-01-01 RX ADMIN — HYDROCODONE BITARTRATE AND ACETAMINOPHEN 2 TABLET: 10; 325 TABLET ORAL at 03:43

## 2018-01-01 RX ADMIN — PANTOPRAZOLE SODIUM 40 MG: 40 TABLET, DELAYED RELEASE ORAL at 06:45

## 2018-01-01 RX ADMIN — LEVETIRACETAM 500 MG: 500 TABLET, FILM COATED ORAL at 21:11

## 2018-01-01 RX ADMIN — MAGNESIUM HYDROXIDE 10 ML: 2400 SUSPENSION ORAL at 17:22

## 2018-01-01 RX ADMIN — DEXAMETHASONE SODIUM PHOSPHATE 4 MG: 4 INJECTION, SOLUTION INTRAMUSCULAR; INTRAVENOUS at 03:52

## 2018-01-01 RX ADMIN — HYDROCODONE BITARTRATE AND ACETAMINOPHEN 2 TABLET: 10; 325 TABLET ORAL at 09:53

## 2018-01-01 RX ADMIN — Medication 1000 MCG: at 11:49

## 2018-01-01 RX ADMIN — DEXAMETHASONE 4 MG: 4 TABLET ORAL at 23:33

## 2018-01-01 RX ADMIN — HYDROCODONE BITARTRATE AND ACETAMINOPHEN 2 TABLET: 10; 325 TABLET ORAL at 23:38

## 2018-01-01 RX ADMIN — ENOXAPARIN SODIUM 40 MG: 40 INJECTION SUBCUTANEOUS at 10:42

## 2018-01-01 RX ADMIN — LEVETIRACETAM 500 MG: 500 TABLET, FILM COATED ORAL at 11:44

## 2018-01-01 RX ADMIN — SODIUM CHLORIDE 125 ML/HR: 9 INJECTION, SOLUTION INTRAVENOUS at 02:38

## 2018-01-01 RX ADMIN — DEXAMETHASONE 4 MG: 4 TABLET ORAL at 16:52

## 2018-01-01 RX ADMIN — BISACODYL 10 MG: 10 SUPPOSITORY RECTAL at 09:31

## 2018-01-01 RX ADMIN — HYDROMORPHONE HYDROCHLORIDE 0.5 MG: 1 INJECTION, SOLUTION INTRAMUSCULAR; INTRAVENOUS; SUBCUTANEOUS at 13:20

## 2018-01-01 RX ADMIN — SODIUM CHLORIDE 100 ML/HR: 9 INJECTION, SOLUTION INTRAVENOUS at 13:11

## 2018-01-01 RX ADMIN — SODIUM CHLORIDE 1000 ML: 9 INJECTION, SOLUTION INTRAVENOUS at 00:51

## 2018-01-01 RX ADMIN — POLYETHYLENE GLYCOL 3350 17 G: 17 POWDER, FOR SOLUTION ORAL at 12:27

## 2018-01-01 RX ADMIN — HYDROCODONE BITARTRATE AND ACETAMINOPHEN 2 TABLET: 10; 325 TABLET ORAL at 20:09

## 2018-01-01 RX ADMIN — Medication 2 TABLET: at 10:09

## 2018-01-01 RX ADMIN — HYDROCODONE BITARTRATE AND ACETAMINOPHEN 2 TABLET: 10; 325 TABLET ORAL at 19:36

## 2018-01-01 RX ADMIN — GLYCOPYRROLATE 0.2 MG: 0.2 INJECTION, SOLUTION INTRAMUSCULAR; INTRAVENOUS at 02:32

## 2018-01-01 RX ADMIN — HYDROCODONE BITARTRATE AND ACETAMINOPHEN 2 TABLET: 10; 325 TABLET ORAL at 21:15

## 2018-01-01 RX ADMIN — Medication 1000 MCG: at 12:28

## 2018-01-01 RX ADMIN — SODIUM CHLORIDE 1000 ML: 9 INJECTION, SOLUTION INTRAVENOUS at 23:51

## 2018-01-01 RX ADMIN — Medication 1000 MCG: at 10:43

## 2018-01-01 RX ADMIN — IPRATROPIUM BROMIDE AND ALBUTEROL SULFATE 3 ML: .5; 3 SOLUTION RESPIRATORY (INHALATION) at 15:31

## 2018-01-01 RX ADMIN — Medication 2 TABLET: at 18:10

## 2018-01-01 RX ADMIN — METOPROLOL SUCCINATE 12.5 MG: 25 TABLET, FILM COATED, EXTENDED RELEASE ORAL at 08:50

## 2018-01-01 RX ADMIN — DILTIAZEM HYDROCHLORIDE 240 MG: 240 CAPSULE, COATED, EXTENDED RELEASE ORAL at 10:28

## 2018-01-01 RX ADMIN — DEXAMETHASONE 4 MG: 4 TABLET ORAL at 00:02

## 2018-01-01 RX ADMIN — HYDROCODONE BITARTRATE AND ACETAMINOPHEN 2 TABLET: 10; 325 TABLET ORAL at 06:45

## 2018-01-01 RX ADMIN — HYDROMORPHONE HYDROCHLORIDE 0.5 MG: 1 INJECTION, SOLUTION INTRAMUSCULAR; INTRAVENOUS; SUBCUTANEOUS at 16:31

## 2018-01-01 RX ADMIN — HYDROMORPHONE HYDROCHLORIDE 0.5 MG: 1 INJECTION, SOLUTION INTRAMUSCULAR; INTRAVENOUS; SUBCUTANEOUS at 21:01

## 2018-01-01 RX ADMIN — FAMOTIDINE 20 MG: 20 TABLET, FILM COATED ORAL at 10:42

## 2018-01-01 RX ADMIN — HYDROMORPHONE HYDROCHLORIDE 2 MG: 2 INJECTION INTRAMUSCULAR; INTRAVENOUS; SUBCUTANEOUS at 02:33

## 2018-01-01 RX ADMIN — IPRATROPIUM BROMIDE AND ALBUTEROL SULFATE 3 ML: .5; 3 SOLUTION RESPIRATORY (INHALATION) at 15:12

## 2018-01-01 RX ADMIN — HYDROCODONE BITARTRATE AND ACETAMINOPHEN 2 TABLET: 10; 325 TABLET ORAL at 00:45

## 2018-01-01 RX ADMIN — HYDROCODONE BITARTRATE AND ACETAMINOPHEN 2 TABLET: 10; 325 TABLET ORAL at 22:47

## 2018-01-01 RX ADMIN — LORAZEPAM 1 MG: 2 INJECTION INTRAMUSCULAR; INTRAVENOUS at 03:52

## 2018-01-01 RX ADMIN — DEXAMETHASONE 4 MG: 4 TABLET ORAL at 23:39

## 2018-01-01 RX ADMIN — HYDROCODONE BITARTRATE AND ACETAMINOPHEN 2 TABLET: 10; 325 TABLET ORAL at 18:51

## 2018-01-01 RX ADMIN — NICOTINE 1 PATCH: 14 PATCH, EXTENDED RELEASE TRANSDERMAL at 23:40

## 2018-01-01 RX ADMIN — SODIUM CHLORIDE 100 ML/HR: 9 INJECTION, SOLUTION INTRAVENOUS at 09:27

## 2018-01-01 RX ADMIN — HYDROCODONE BITARTRATE AND ACETAMINOPHEN 2 TABLET: 10; 325 TABLET ORAL at 05:26

## 2018-01-01 RX ADMIN — POLYETHYLENE GLYCOL 3350 17 G: 17 POWDER, FOR SOLUTION ORAL at 10:42

## 2018-01-01 RX ADMIN — METOPROLOL SUCCINATE 12.5 MG: 25 TABLET, FILM COATED, EXTENDED RELEASE ORAL at 08:51

## 2018-01-01 RX ADMIN — GLYCOPYRROLATE 0.2 MG: 0.2 INJECTION, SOLUTION INTRAMUSCULAR; INTRAVENOUS at 12:43

## 2018-01-01 RX ADMIN — DEXAMETHASONE 4 MG: 4 TABLET ORAL at 23:42

## 2018-01-01 RX ADMIN — DEXAMETHASONE 4 MG: 4 TABLET ORAL at 06:49

## 2018-01-01 RX ADMIN — IPRATROPIUM BROMIDE AND ALBUTEROL SULFATE 3 ML: .5; 3 SOLUTION RESPIRATORY (INHALATION) at 11:41

## 2018-01-01 RX ADMIN — HYDROCODONE BITARTRATE AND ACETAMINOPHEN 2 TABLET: 7.5; 325 TABLET ORAL at 13:40

## 2018-01-01 RX ADMIN — LORAZEPAM 1 MG: 2 INJECTION INTRAMUSCULAR; INTRAVENOUS at 12:43

## 2018-01-01 RX ADMIN — HYDROMORPHONE HYDROCHLORIDE 0.5 MG: 1 INJECTION, SOLUTION INTRAMUSCULAR; INTRAVENOUS; SUBCUTANEOUS at 13:32

## 2018-01-01 RX ADMIN — GLYCOPYRROLATE 0.2 MG: 0.2 INJECTION, SOLUTION INTRAMUSCULAR; INTRAVENOUS at 20:44

## 2018-01-01 RX ADMIN — DEXAMETHASONE 4 MG: 4 TABLET ORAL at 14:07

## 2018-01-01 RX ADMIN — FAMOTIDINE 20 MG: 20 TABLET, FILM COATED ORAL at 21:11

## 2018-01-01 RX ADMIN — LEVETIRACETAM 500 MG: 500 TABLET, FILM COATED ORAL at 20:14

## 2018-01-01 RX ADMIN — Medication 1000 MCG: at 13:28

## 2018-01-01 RX ADMIN — HYDROCODONE BITARTRATE AND ACETAMINOPHEN 2 TABLET: 7.5; 325 TABLET ORAL at 05:35

## 2018-01-01 RX ADMIN — LEVETIRACETAM 500 MG: 500 TABLET, FILM COATED ORAL at 11:46

## 2018-01-01 RX ADMIN — HYDROCODONE BITARTRATE AND ACETAMINOPHEN 1 TABLET: 5; 325 TABLET ORAL at 08:25

## 2018-01-01 RX ADMIN — HYDROCODONE BITARTRATE AND ACETAMINOPHEN 1 TABLET: 10; 325 TABLET ORAL at 15:11

## 2018-01-01 RX ADMIN — ONDANSETRON 4 MG: 4 TABLET, FILM COATED ORAL at 16:48

## 2018-01-01 RX ADMIN — HYDROMORPHONE HYDROCHLORIDE 2 MG: 1 INJECTION, SOLUTION INTRAMUSCULAR; INTRAVENOUS; SUBCUTANEOUS at 17:07

## 2018-01-01 RX ADMIN — HYDROCODONE BITARTRATE AND ACETAMINOPHEN 2 TABLET: 10; 325 TABLET ORAL at 04:48

## 2018-01-01 RX ADMIN — DILTIAZEM HYDROCHLORIDE 240 MG: 240 CAPSULE, COATED, EXTENDED RELEASE ORAL at 11:46

## 2018-01-01 RX ADMIN — Medication 2 TABLET: at 01:14

## 2018-01-01 RX ADMIN — HYDROCODONE BITARTRATE AND ACETAMINOPHEN 1 TABLET: 5; 325 TABLET ORAL at 13:00

## 2018-01-01 RX ADMIN — FAMOTIDINE 20 MG: 20 TABLET, FILM COATED ORAL at 21:30

## 2018-01-01 RX ADMIN — HYDROCODONE BITARTRATE AND ACETAMINOPHEN 2 TABLET: 10; 325 TABLET ORAL at 11:00

## 2018-01-01 RX ADMIN — LACTULOSE 30 G: 10 SOLUTION ORAL at 17:23

## 2018-01-01 RX ADMIN — DEXAMETHASONE 4 MG: 4 TABLET ORAL at 09:20

## 2018-08-31 PROBLEM — M25.552 LEFT HIP PAIN: Status: ACTIVE | Noted: 2018-01-01

## 2018-08-31 PROBLEM — R31.9 HEMATURIA: Status: ACTIVE | Noted: 2018-01-01

## 2018-08-31 PROBLEM — R93.5 ABNORMAL CT OF THE ABDOMEN: Status: ACTIVE | Noted: 2018-01-01

## 2018-08-31 PROBLEM — R91.1 LUNG NODULE: Status: ACTIVE | Noted: 2018-01-01

## 2018-08-31 PROBLEM — S22.39XA CLOSED FRACTURE OF ONE RIB: Status: ACTIVE | Noted: 2018-01-01

## 2018-09-01 NOTE — PLAN OF CARE
Problem: Patient Care Overview  Goal: Plan of Care Review  Outcome: Ongoing (interventions implemented as appropriate)   09/01/18 0542   Coping/Psychosocial   Plan of Care Reviewed With patient   Plan of Care Review   Progress no change   OTHER   Outcome Summary Pt had some c/o right hip and abdominal pain. Norco and i dose of iv dilaudid given. Pt really wants to go home. Up ad heriberto. Continue to monitor.       Problem: Oncology Care (Adult)  Goal: Signs and Symptoms of Listed Potential Problems Will be Absent, Minimized or Managed (Oncology Care)  Outcome: Ongoing (interventions implemented as appropriate)

## 2018-09-01 NOTE — PROGRESS NOTES
[facility] INPATIENT PROGRESS NOTE  Subjective     CC: Apparent metastatic disease    Interval history:         Beltran Leo is a 63 y.o. male who we are asked to see today in consultation for potential metastatic disease.     He has history of tobacco abuse but otherwise has done well but began to have increasing pain in his left hip after lifting an air conditioner.  He presented to the emergency room where chest x-ray revealed a potential lung mass.  Follow-up CT revealed an abnormality along the pleural surface of the right lower lobe with necrotic 1.6 x 2.1 cm nodule, necrotic lymph nodes in the right hilum, mediastinal lymph nodes the largest in the right paratracheal space in partial collapse the right middle lobe.  Finally there is additional evidence of a small nonobstructing right renal Ocular's and fracture left 10th rib and subtle lytic lesion within the left fourth rib.  Reassessment of the abdomen demonstrated multiple low-density liver lesions the largest the left hepatic lobe with all being suspicious for metastatic disease.  There are also numerous low-density renal lesions several which did not appear to reflect cyst and bilateral adrenal enlargement with the 21 mm low-density mass the right adrenal gland suspicious for metastatic disease.  The patient this morning is scheduled for a biopsy of the liver for diagnostic purposes       Medications:  The current medication list was reviewed in the EMR.    Allergies:  No Known Allergies    Objective      Vitals:    09/01/18 0504   BP: 125/83   Pulse: 75   Resp: 18   Temp: 98.8 °F (37.1 °C)   SpO2: 98%        Physical exam:   GENERAL:  Well-developed, well-nourished in no acute distress.   SKIN:  Warm, dry without rashes, purpura or petechiae.  HEAD:  Normocephalic.  EYES:  Pupils equal, round and reactive to light.  EOMs intact.  Conjunctivae normal.  EARS:  Hearing intact.  NOSE:  Septum midline.  No excoriations or nasal  discharge.  MOUTH:  Tongue is well-papillated; no stomatitis or ulcers.  Lips normal.  THROAT:  Oropharynx without lesions or exudates.  NECK:  Supple with good range of motion; no thyromegaly or masses, no JVD.  LYMPHATICS:  No cervical, supraclavicular, axillary or inguinal adenopathy.  CHEST:  Lungs clear to percussion and auscultation. Good airflow.  CARDIAC:  Regular rate and rhythm without murmurs, rubs or gallops. Normal S1,S2.  ABDOMEN:  Soft, nontender with no organomegaly or masses.  EXTREMITIES:  No clubbing, cyanosis or edema.  NEUROLOGICAL:  Cranial Nerves II-XII grossly intact.  No focal neurological deficits.  PSYCHIATRIC:  Normal affect and mood.        RECENT LABS:      Results from last 7 days  Lab Units 08/31/18 0618 08/30/18 2052   WBC 10*3/mm3 9.71 14.97*   HEMOGLOBIN g/dL 12.1* 13.5*   HEMATOCRIT % 37.8* 40.3*   PLATELETS 10*3/mm3 328 381       Results from last 7 days  Lab Units 08/31/18 0618 08/30/18 2052   SODIUM mmol/L 139 144   POTASSIUM mmol/L 4.1 3.9   CHLORIDE mmol/L 103 103   CO2 mmol/L 26.7 24.5   BUN mg/dL 22 24*   CREATININE mg/dL 0.84 1.14   CALCIUM mg/dL 9.2 10.2   BILIRUBIN mg/dL  --  0.5   ALK PHOS U/L  --  72   ALT (SGPT) U/L  --  14   AST (SGOT) U/L  --  14   GLUCOSE mg/dL 92 135*           Assessment/Plan          This is a 63 y.o. male with with relatively little past medical history except for long-term tobacco use.  The patient had begun to have pain and was assessed in the emergency room for this with scans unfortunately showing evidence of likely metastatic disease to the liver and pulmonary lesions.  At this point we'll need diagnostic biopsy and this is currently scheduled/pending.  I have again introduced myself to the patient and discussed that we'll follow and help provide additional treatment options if indeed a malignancy is documented.                 Eliot Arriaga MD  9/1/2018  9:26 AM

## 2018-09-01 NOTE — CONSULTS
Orthopedic Consult      Patient: Beltran Leo  YOB: 1955     Date of Admission: 8/30/2018  8:26 PM    Medical Record Number: [id number]    Attending Physician: Jerica Hou MD    Consulting Physician: Jared Whitley MD    Reason for Consult: LEFT hip pain, popping.    History of Present Illness: 63 y.o. male admitted to [facility] with Lung nodule [R91.1]  Abnormal CT of the abdomen [R93.5]  Closed fracture of one rib, unspecified laterality, initial encounter [S22.39XA]  Hematuria, unspecified type [R31.9].     The patient was evaluated in the emergency room and was diagnosed with a  Suspicious nodule in the lung and abnormal CT of the abdomen.  The patient came to the ER with complaints of pain in the left hip after he was lifting a portable air conditioning unit he felt a pop and it was painful to walk.  Secondary to the age and multiple medical co morbidities the patient was admitted to the hospitalist.   As I was on call for the emergency room I was consulted for further evaluation and treatment.   The patient was in the usual state of health and    .     Denies any history of fevers, chills, gout, other joint pains.   Patient is a  community ambulator. Patient denies walker/cane assistive device.     The patient  lives at home alone , is quite active and independent in activities of daily living.    The patient denies history of dementia.    Patient denies any history of: DVT/PE, MRSA, COPD, CHF, CAD, Diabetes mellitus, Dementia or A-Fib.   The patient has history of :  The patient is not on anticoagulants:       Past medical history, Past surgical history, family history, Social history, current medications, home medications Have been reviewed by me.    Past Medical History:   Diagnosis Date   • Abnormal CT of the abdomen      History reviewed. No pertinent surgical history.  Social History     Occupational History   • Not on file.     Social History Main Topics    • Smoking status: Current Every Day Smoker   • Smokeless tobacco: Not on file   • Alcohol use No   • Drug use: No   • Sexual activity: Not on file    Social History     Social History Narrative   • No narrative on file     Family History   Problem Relation Age of Onset   • Heart attack Mother    • Heart attack Father         No Known Allergies     Home Medications:  No prescriptions prior to admission.       Current Medications:  Scheduled Meds:   Continuous Infusions:   PRN Meds:.•  acetaminophen  •  HYDROcodone-acetaminophen **OR** HYDROcodone-acetaminophen  •  HYDROmorphone **AND** naloxone  •  LORazepam  •  ondansetron **OR** ondansetron ODT **OR** ondansetron  •  sodium chloride    Review of Systems:   A 12 point system review was reviewed with the patient and from the chart  and is negative except as mentioned in history of present illness.      Physical Exam: 63 y.o. male                    Vitals:    08/31/18 0926 08/31/18 1051 08/31/18 1112 08/31/18 1944   BP: 117/69 120/66 128/79 145/92   BP Location: Left arm Right arm Right arm Right arm   Patient Position: Lying Lying Sitting Sitting   Pulse: 87 70 78 97   Resp: 16 16 16 16   Temp:   98.6 °F (37 °C) 98.9 °F (37.2 °C)   TempSrc:   Oral Oral   SpO2: 94% 94% 96% 97%   Weight:       Height:            Gait: Not evaluated.     Mental/HEENT/cardio/skin: The patient's general appearance was well-nourished, well-hydrated, no acute distress.  Orientation was alert and oriented ×3.  The patient's mood was normal.   Pulmonary exam shows normal late exchange, no labored breathing, or shortness of breath.    The  skin exam showed normal temperature and color in the area of examination.    Extremities: the patient is able to ambulate without any antalgic gait.  He is not using any assistive device.  LEFT hip movements are painless and full.  Negative Stinchfield sign. Negative Trendelenburg sign.    Pulses:  Pulses palpable and equal bilaterally    Diagnostic  Tests:      Results from last 7 days  Lab Units 08/31/18 0618 08/30/18 2052   WBC 10*3/mm3 9.71 14.97*   HEMOGLOBIN g/dL 12.1* 13.5*   HEMATOCRIT % 37.8* 40.3*   PLATELETS 10*3/mm3 328 381       Results from last 7 days  Lab Units 08/31/18 0618 08/30/18 2052   SODIUM mmol/L 139 144   POTASSIUM mmol/L 4.1 3.9   CHLORIDE mmol/L 103 103   CO2 mmol/L 26.7 24.5   BUN mg/dL 22 24*   CREATININE mg/dL 0.84 1.14   GLUCOSE mg/dL 92 135*   CALCIUM mg/dL 9.2 10.2       Results from last 7 days  Lab Units 08/31/18 0618 08/30/18 2052   INR   --  1.11*   APTT seconds 31.1  --      No results found for: URICACID  No results found for: CRYSTAL  Microbiology Results (last 10 days)     ** No results found for the last 240 hours. **        Xr Chest 2 View    Result Date: 8/30/2018  Right lung base nodules with suspicion of hilar adenopathy and postobstructive atelectasis all concerning for neoplastic process.  This report was finalized on 8/30/2018 11:00 PM by Karri Shabazz M.D.      Ct Abdomen Pelvis With Contrast    Result Date: 8/30/2018  IMPRESSION : 1. Findings most consistent with metastatic neoplastic process. A pulmonary primary would be a strong diagnostic consideration given the pattern. 2. Low-density renal lesions, several do not reflect simple cysts. 3. Subtle nondisplaced left 10th rib fracture with adjacent muscular thickening   This report was finalized on 8/30/2018 10:21 PM by Karri Shabazz M.D.      Xr Hip With Or Without Pelvis 2 - 3 View Left    Result Date: 8/30/2018  1. No acute osseous abnormality.  This report was finalized on 8/30/2018 9:06 PM by Karri Shabazz M.D.        The labs, X-ray results for preoperative evaluation have been reviewed by me.    Assessment: LEFT hip pain and popping.    Patient Active Problem List   Diagnosis   • Hematuria   • Closed fracture of one rib   • Lung nodule   • Abnormal CT of the abdomen   • Left hip pain       Plan:    Options and alternatives were discussed in  detail with the patient.   The patient is indicated for gentle mobilization as tolerated.  No evidence of fractures on plain x-rays.  CT scan of the pelvis negative  Bone findings.   Advised to follow-up with me in the office In 3 weeks.   I will sign off but will be available.  If he fails to improve, he is likely to require MRI evaluation of the LEFT hip.    Date: 8/31/2018    Jared Whitley MD     CC: Provider, No Known; MD Ameya Johansen Elizabeth Diane, MD

## 2018-09-01 NOTE — DISCHARGE SUMMARY
Date of Discharge:  9/1/2018  Date of Admit: 8/30/2018    Discharge Diagnosis:  Active Problems:    Hematuria    Closed fracture of one rib    Lung nodule    Abnormal CT of the abdomen    Left hip pain      Procedures Performed         Consults     Date and Time Order Name Status Description    8/31/2018 1336 Inpatient Orthopedic Surgery Consult Completed     8/31/2018 3278 Hematology & Oncology Inpatient Consult Completed     8/30/2018 2309 [facility] (on-call MD unless specified) Completed             Hospital Course:   62 yo male who presented with left hip pain that developed after lifting a portable air conditioning.  On evaluation a chest x-ray was obtained which showed a possible lung mass.  CT of the abdomen showed lesions in the liver that were likely metastatic disease.  He was admitted and seen in consultation by oncology.  CT-guided biopsy of the lung mass was ordered however the radiologist felt that the liver would be easier to biopsy.  He said that the lung mass looked like it had some necrotic areas and that he could potentially be a nondiagnostic biopsy.  This was therefore changed to biopsy of one of the liver lesions.  P tolerated this well but was still requiring IV Dilaudid for the hip pain so I consulted orthopedics to see him.  Dr. Whitley saw him and reviewed his x-rays and CT.  He suggested outpatient follow-up if the pain does not resolve, but did not see any fractures present.  Today the patient's hip pain is significantly better and he has been able to ambulate on it.  He is tolerating his diet and this point is stable for discharge to home with outpatient follow-up of the biopsies.      Physical Exam:  WDWN male in NAD. Alert & oriented.  Lungs clear  Heart RRR  Abd soft, NT, BS+  Ext no edema.  Skin warm & dry      Discharge Medications     Discharge Medications      New Medications      Instructions Start Date   HYDROcodone-acetaminophen 7.5-325 MG per tablet  Commonly known as:  NORCO   1  tablet, Oral, Every 4 Hours PRN               Activity at Discharge:     Follow-up Appointments  Additional Instructions for the Follow-ups that You Need to Schedule     Discharge Follow-up with Specified Provider: [facility]; 1 Week    As directed      To:  [facility]    Follow Up:  1 Week           Follow-up Information     Jared Whitley MD. Schedule an appointment as soon as possible for a visit in 3 week(s).    Specialty:  Orthopedic Surgery  Contact information:  [address] [place] [address] [address]  [place] [id number] [phone]             Provider, No Known .    Contact information:  [facility] [address] [phone]                   DISCHARGE DISPOSITION:     Greater than 30 minutes spent on discharge      Jerica Hou MD  09/01/18  5:10 PM

## 2018-09-01 NOTE — PLAN OF CARE
Problem: Patient Care Overview  Goal: Plan of Care Review  Outcome: Ongoing (interventions implemented as appropriate)   09/01/18 0142   Coping/Psychosocial   Plan of Care Reviewed With patient   Plan of Care Review   Progress no change   OTHER   Outcome Summary Pt. up ad heriberto. Pain controled on oral medication X 1. Pt. states he is ready to go home. Waiting for MD visit. Will continue to monitor.      Goal: Individualization and Mutuality  Outcome: Ongoing (interventions implemented as appropriate)      Problem: Pain, Acute (Adult)  Goal: Acceptable Pain Control/Comfort Level  Outcome: Ongoing (interventions implemented as appropriate)      Problem: Oncology Care (Adult)  Goal: Signs and Symptoms of Listed Potential Problems Will be Absent, Minimized or Managed (Oncology Care)  Outcome: Ongoing (interventions implemented as appropriate)

## 2018-09-04 NOTE — TELEPHONE ENCOUNTER
----- Message from Eliot Arriaga MD sent at 9/2/2018 11:29 AM EDT -----  This patient was discharged September 01, 2018.  He needs follow-up appointment– Bart, 2 units, 2- 3 weeks

## 2018-09-07 PROBLEM — I48.3 TYPICAL ATRIAL FLUTTER (HCC): Status: ACTIVE | Noted: 2018-01-01

## 2018-09-07 NOTE — PROGRESS NOTES
Dr Arriaga saw patient in hospital for likely metastatic cancer. Family calling today for pain medication. I reviewed chart and his path was non-diagnostic thus I'd like to try another biopsy before he sees Dr Arriaga back later this month. Will order CT guided biopsy of right pleural based lesion.

## 2018-09-07 NOTE — TELEPHONE ENCOUNTER
----- Message from Sandy Samson RN sent at 9/7/2018 11:01 AM EDT -----  Please call patient and set up Right LL CT guided biopsy. Dr Lombardo is placing order and patient is aware and expecting call. THis to be done this coming week if possible so we can get results before MD appt later this month. THanks

## 2018-09-07 NOTE — TELEPHONE ENCOUNTER
----- Message from Tayla Maldonado sent at 9/7/2018 10:09 AM EDT -----  Contact: [phone]  Eliot brother Asking about biopsy result pt has been in pain.

## 2018-09-07 NOTE — TELEPHONE ENCOUNTER
Spoke with patient's brother. Pt is still experiencing pain and was only given 20 tabs of norco. Pt also wanted to know if biopsy results were back. Reviewed with Dr Lombardo #2. She does not think the biopsy gave us enough information to diagnose the patient. She would like the patient to have a Right LL Ct Guided biopsy before MD appt. Ok to refill [place] with enough pills to get to MD appt later this month. Dr Lombardo to place order. Norco escribed and signed by Dr Lombardo. Inbasket sent to scheduling to set up biopsy. Called and spoke with patient about this and he is in agreement.     ----- Message from Tayla Maldonado sent at 9/7/2018 10:09 AM EDT -----  Contact: [phone]  Eliot brother Asking about biopsy result pt has been in pain.

## 2018-09-08 PROBLEM — R91.8 LUNG MASS: Status: ACTIVE | Noted: 2018-01-01

## 2018-09-08 PROBLEM — S22.32XA LEFT RIB FRACTURE: Status: ACTIVE | Noted: 2018-01-01

## 2018-09-08 PROBLEM — R06.2 WHEEZES: Status: ACTIVE | Noted: 2018-01-01

## 2018-09-08 NOTE — PLAN OF CARE
Problem: Patient Care Overview  Goal: Plan of Care Review  Outcome: Ongoing (interventions implemented as appropriate)   09/08/18 8283   Plan of Care Review   Progress no change   OTHER   Outcome Summary complaints of pain every 3 hours but at one point said he wanted it just to go back to sleep. at that time it was not due. was told about poss lung cancer. Biopsy ordered. will cont to monitor.

## 2018-09-08 NOTE — PLAN OF CARE
Problem: Patient Care Overview  Goal: Plan of Care Review  Outcome: Ongoing (interventions implemented as appropriate)   09/08/18 0404   Coping/Psychosocial   Plan of Care Reviewed With patient   Plan of Care Review   Progress no change   OTHER   Outcome Summary Pt admitted to floor from ER with Afib/flutter with RVR. Pt converted to SR with cardizem drip and PO meds. Pt complains of pain from broken rib and pain meds given. Cardizem gtt cont. Cardiology consulted and nutrition to see for rapid weight loss. VSS. Continue to monitor. Safety maintained.        Problem: Pain, Acute (Adult)  Goal: Identify Related Risk Factors and Signs and Symptoms  Outcome: Outcome(s) achieved Date Met: 09/08/18    Goal: Acceptable Pain Control/Comfort Level  Outcome: Ongoing (interventions implemented as appropriate)

## 2018-09-08 NOTE — ED NOTES
Pt c/o of worsening generalized pain. Pain worse in his left lower back, left posterior thigh, and shoulder blades.    Pt was recently admitted for a fall last Thursday.      Kristie Ruiz RN  09/07/18 5272

## 2018-09-08 NOTE — CONSULTS
Pulmonary Consultation     Patient Name: Beltran Leo  Age/Sex: 63 y.o. male  : 1955  MRN: [id number]    Date of Admission: 2018  Date of Encounter Visit: 18  Encounter Provider: Padma Rossi MD  Referring Provider: Joseph Ramirez MD  Place of Service: [facility]  Patient Care Team:  Provider, No Known as PCP - Eliot Wray MD as Consulting Physician (Hematology and Oncology)      Subjective:     Consulted for: lung mass incidental finding on chest imaging    Chief Complaint: lung mass    History of Present Illness:  Beltran Leo is a 63 y.o. male with only risk factor for lung cancer being smoking one half pack per day for 43 years with nearly 60 pack years committed to smoking history.  He denies any history of COPD he does have some minimal smoker's cough, he denies any recurrent respiratory infection, he denies any pleuritic chest pain except from the recent pain from the pulled muscle on the left side.  Patient denies any unexplained weight loss, he has no family history of lung cancer, he denies any history of asbestos or silica dust.  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  He was discharged and he was supposed to follow-up as an outpatient however he did develop an episode of palpitation with tachycardia and came in to the hospital and was diagnosed with acute new onset atrial fibrillation, patient did convert while at [facility], he is being currently followed by cardiology.  Primary service was consulted for further evaluation on the pulmonary nodules especially that we don't have a tissue diagnosis or an actual diagnosis yet.  Patient is denying any symptoms suspect a respiratory infection like fever or chills or night sweats  Patient denies any underlying  connective tissue disease like rheumatoid arthritis or lupus or any history of skin lesion or abnormal joint swelling or effusion.  Patient denies any personal history of any other type of cancer.  He is physically active and denies any significant dyspnea or limited activity because of underlying lung issue    Pulmonary Functions Testing Results:    No results found for: FEV1, FVC, NTL1BPS, TLC, DLCO    Review of Systems:   Review of Systems   Constitutional: Negative for activity change, appetite change and fever.   HENT: Negative for congestion and sore throat.    Eyes: Negative.    Respiratory: Negative for cough and shortness of breath.    Cardiovascular: Negative for chest pain and leg swelling.        Tachycardia   Gastrointestinal: Negative for abdominal pain, diarrhea and vomiting.   Endocrine: Negative.    Genitourinary: Negative for decreased urine volume and dysuria.   Musculoskeletal: Negative for neck pain.   Skin: Negative for rash and wound.   Allergic/Immunologic: Negative.    Neurological: Negative for weakness, numbness and headaches.   Hematological: Negative.    Psychiatric/Behavioral: Negative.    All other systems reviewed and are negative.  Past Medical History:  Past Medical History:   Diagnosis Date   • Abnormal CT of the abdomen    • H/O Liver lesions    • H/O Lung nodule        Past Surgical History:   Procedure Laterality Date   • LIVER BIOPSY  08/31/2018    CT guided       Home Medications:   Prescriptions Prior to Admission   Medication Sig Dispense Refill Last Dose   • HYDROcodone-acetaminophen (NORCO) 7.5-325 MG per tablet Take 1 tablet by mouth Every 4 (Four) Hours As Needed for Moderate Pain . 108 tablet 0        Inpatient Medications:  Scheduled Meds:  diltiaZEM  mg Oral Q24H   ipratropium-albuterol 3 mL Nebulization Q4H - RT     Continuous Infusions:   PRN Meds:.•  acetaminophen  •  acetaminophen  •  HYDROcodone-acetaminophen  •  nitroglycerin  •  ondansetron **OR**  ondansetron ODT **OR** ondansetron  •  ondansetron  •  sodium chloride  •  Insert peripheral IV **AND** sodium chloride    Allergies:  No Known Allergies    Past Social History:  Social History     Social History   • Marital status: Single     Occupational History   •  [employer]     Social History Main Topics   • Smoking status: Current Every Day Smoker   • Alcohol use No   • Drug use: No     Other Topics Concern   • Not on file       Past Family History:  Family History   Problem Relation Age of Onset   • Heart attack Mother    • Heart attack Father            Objective:   Temp:  [97.6 °F (36.4 °C)-98.6 °F (37 °C)] 97.6 °F (36.4 °C)  Heart Rate:  [] 78  Resp:  [16-18] 16  BP: (100-148)/(72-94) 133/82  SpO2:  [93 %-98 %] 96 %  on    Device (Oxygen Therapy): room air     Intake/Output Summary (Last 24 hours) at 09/08/18 1410  Last data filed at 09/08/18 1245   Gross per 24 hour   Intake               60 ml   Output                0 ml   Net               60 ml     Body mass index is 23.24 kg/m².  1    09/07/18  0100 09/08/18  0101   Weight: 70.9 kg (156 lb 3.2 oz) 65.3 kg (143 lb 15.4 oz)     Weight change: -5.552 kg (-12 lb 3.8 oz)    Physical Exam:   Physical Exam   General:    No acute distress, alert and oriented x4, pleasant                   Head:    Normocephalic, atraumatic.   Eyes:          Conjunctivae and sclerae normal, no icterus, PERRLA   Throat:   No oral lesions, no thrush, oral mucosa moist.    Neck:   Supple, trachea midline. no cervical or supraclavicular lymphadenopathy    Lungs:     Normal chest on inspection, mild and expiratory wheezes especially on forced exhalation. No rhonchi. No crackles. Respirations regular, even and unlabored.  Tender chest pain on the left side to palpation on the lateral aspect     Heart:    Regular rhythm and normal rate.  No murmurs, gallops, or rubs noted.   Abdomen:     Soft, non-tender, non-distended, positive bowel sounds.    Extremities:   No  clubbing, cyanosis, or edema.     Pulses:   Pulses palpable and equal bilaterally.    Skin:   No bleeding or rash.   Neuro:   Non-focal.  Moves all extremities well.    Psychiatric:   Normal mood and affect.     Lab Review:     Results from last 7 days  Lab Units 09/08/18  0529 09/07/18 2126   SODIUM mmol/L 135* 134*   POTASSIUM mmol/L 4.1 4.0   CHLORIDE mmol/L 92* 91*   CO2 mmol/L 27.2 26.1   BUN mg/dL 31* 27*   CREATININE mg/dL 0.88 0.88   GLUCOSE mg/dL 106* 121*   CALCIUM mg/dL 10.1 10.2   AST (SGOT) U/L  --  13   ALT (SGPT) U/L  --  8   ALBUMIN g/dL  --  4.20       Results from last 7 days  Lab Units 09/07/18 2126   TROPONIN T ng/mL <0.010       Results from last 7 days  Lab Units 09/08/18 0529 09/07/18 2126   WBC 10*3/mm3 11.33* 13.41*   HEMOGLOBIN g/dL 13.1* 14.2   HEMATOCRIT % 40.4 42.8   PLATELETS 10*3/mm3 369 412   MCV fL 93.3 93.2   MCH pg 30.3 30.9   MCHC g/dL 32.4* 33.2   RDW % 11.6 11.7   RDW-SD fl 39.5 39.4   MPV fL 10.9 11.0   NEUTROPHIL % %  --  81.1*   LYMPHOCYTE % %  --  12.5*   MONOCYTES % %  --  6.0   EOSINOPHIL % %  --  0.1*   BASOPHIL % %  --  0.1   IMM GRAN % %  --  0.2   NEUTROS ABS 10*3/mm3  --  10.86*   LYMPHS ABS 10*3/mm3  --  1.68   MONOS ABS 10*3/mm3  --  0.80   EOS ABS 10*3/mm3  --  0.02   BASOS ABS 10*3/mm3  --  0.02   IMMATURE GRANS (ABS) 10*3/mm3  --  0.03       Results from last 7 days  Lab Units 09/07/18 2126   INR  1.10   APTT seconds 33.5       Results from last 7 days  Lab Units 09/07/18 2126   MAGNESIUM mg/dL 2.1           Invalid input(s): LDLCALC    Results from last 7 days  Lab Units 09/07/18 2126   PROBNP pg/mL 628.6       Results from last 7 days  Lab Units 09/07/18 2126   TSH mIU/mL 3.130                                         Imaging:  Imaging Results (most recent)     Procedure Component Value Units Date/Time    XR Chest 1 View [[id number]] Collected:  09/07/18 2137     Updated:  09/07/18 2137    Narrative:       CHEST PA AND LATERAL.     HISTORY: Tachycardia.      COMPARISON: 8/20/2018.     FINDINGS:  Cardiomediastinal silhouette is within normal limits.         There is no consolidation or effusion. 2.5 cm soft tissue nodule at the  right lung base is unchanged. A few smaller densities are not as well  visualized.          Impression:       No acute findings. Nodular opacities of the right lung base measuring up  to 2.5 cm, no significant change.                           I personally viewed and interpreted the patient's imaging studies.    Assessment:   Multiple pulmonary nodules with mediastinal lymphadenopathy and distant lesion suspicious for lung cancer with metastasis  Suspected COPD  Rib fracture and torn muscle from trying to lift a heavy object  New onset atrial fibrillation back in sinus rhythm          Plan:   Bao to do get a tissue biopsy  Malignancy is still the likely diagnosis, possibly but less likely is infection/Sarcoidosis or other auto immune diseases  Liver biopsy was negative, we should have a good yield from a CT guided guided biopsy of the largest RLL nodule, hat also happened to have a very convenient location for CT biopsy  We can consider bronchoscopy with EBUS, however if the lung biopsy is positive for CA, this is definitely a metastatic disease and the lymph node biopsy should not add much to the final staging.  Patient is to have a full PFT for further evaluation of the underlying lung function, COPD suspected on clinical ground and would consider treatment depending on the degree of symptoms.  Management of his arrhythmia per cardiology  Discussed with the patient and with the nursing staff, questions were answered, will order a CT-guided biopsy and follow  Patient is cleared for discharge from the pulmonary standpoint, any of the above workup can be done and completed as an outpatient  Patient has when necessary bronchodilators added, I will hold on any LAMA/LABA until we do the PFT        Thank you for allowing me to participate in the  care of Beltran Leo. Feel free to contact me directly with any further questions or concerns.    Padma Rossi MD  [facility]   09/08/18  2:10 PM    Dictated utilizing Dragon dictation

## 2018-09-08 NOTE — ED PROVIDER NOTES
EMERGENCY DEPARTMENT ENCOUNTER    CHIEF COMPLAINT  Chief Complaint: Tachycardia  History given by: pt  History limited by: none  Room Number: 30/30  PMD: Provider, No Known      HPI:  Pt is a 63 y.o. male who presents complaining of tachycardia. Pt reports he was admitted last week after breaking a hip and some ribs and admitted to some internal bleeding for about 5 days. Pt denies any dizziness, LOC. Pt reports he has never seen a cardiologist. Pt HR is currently 160 in ER.    Duration:  PTA  Onset: gradual  Timing: constant  Quality: tachycardia  Intensity/Severity: severe  Progression: unchanged  Associated Symptoms: none  Aggravating Factors: none  Alleviating Factors: none  Previous Episodes: none  Treatment before arrival: none    PAST MEDICAL HISTORY  Active Ambulatory Problems     Diagnosis Date Noted   • Hematuria 08/31/2018   • Closed fracture of one rib 08/31/2018   • Lung nodule 08/31/2018   • Abnormal CT of the abdomen 08/31/2018   • Left hip pain 08/31/2018     Resolved Ambulatory Problems     Diagnosis Date Noted   • No Resolved Ambulatory Problems     Past Medical History:   Diagnosis Date   • Abnormal CT of the abdomen    • H/O Liver lesions    • H/O Lung nodule        PAST SURGICAL HISTORY  Past Surgical History:   Procedure Laterality Date   • LIVER BIOPSY  08/31/2018    CT guided       FAMILY HISTORY  Family History   Problem Relation Age of Onset   • Heart attack Mother    • Heart attack Father        SOCIAL HISTORY  Social History     Social History   • Marital status: Single     Spouse name: N/A   • Number of children: N/A   • Years of education: N/A     Occupational History   •  [employer]     Social History Main Topics   • Smoking status: Current Every Day Smoker   • Smokeless tobacco: Not on file   • Alcohol use No   • Drug use: No   • Sexual activity: Not on file     Other Topics Concern   • Not on file     Social History Narrative   • No narrative on file       ALLERGIES  Patient  has no known allergies.    REVIEW OF SYSTEMS  Review of Systems   Constitutional: Negative for activity change, appetite change and fever.   HENT: Negative for congestion and sore throat.    Eyes: Negative.    Respiratory: Negative for cough and shortness of breath.    Cardiovascular: Negative for chest pain and leg swelling.        Tachycardia   Gastrointestinal: Negative for abdominal pain, diarrhea and vomiting.   Endocrine: Negative.    Genitourinary: Negative for decreased urine volume and dysuria.   Musculoskeletal: Negative for neck pain.   Skin: Negative for rash and wound.   Allergic/Immunologic: Negative.    Neurological: Negative for weakness, numbness and headaches.   Hematological: Negative.    Psychiatric/Behavioral: Negative.    All other systems reviewed and are negative.      PHYSICAL EXAM  ED Triage Vitals [09/07/18 2058]   Temp Heart Rate Resp BP SpO2   98.5 °F (36.9 °C) 91 18 106/80 98 %      Temp src Heart Rate Source Patient Position BP Location FiO2 (%)   Tympanic Monitor Sitting -- --       Physical Exam   Constitutional: He is oriented to person, place, and time. No distress.   HENT:   Head: Normocephalic and atraumatic.   Eyes: Pupils are equal, round, and reactive to light. EOM are normal.   Neck: Normal range of motion. Neck supple.   Cardiovascular: Regular rhythm and normal heart sounds.  Tachycardia present.    Pulmonary/Chest: Effort normal and breath sounds normal. No respiratory distress.   Abdominal: Soft. There is no tenderness. There is no rebound and no guarding.   Musculoskeletal: Normal range of motion. He exhibits no edema.   Neurological: He is alert and oriented to person, place, and time. He has normal sensation and normal strength.   Skin: Skin is warm and dry.   Psychiatric: Mood and affect normal.   Nursing note and vitals reviewed.      LAB RESULTS  Lab Results (last 24 hours)     Procedure Component Value Units Date/Time    CBC & Differential [[id number]] Collected:   09/07/18 2126    Specimen:  Blood Updated:  09/07/18 2136    Narrative:       The following orders were created for panel order CBC & Differential.  Procedure                               Abnormality         Status                     ---------                               -----------         ------                     CBC Auto Differential[[id number]]        Abnormal            Final result                 Please view results for these tests on the individual orders.    Comprehensive Metabolic Panel [[id number]]  (Abnormal) Collected:  09/07/18 2126    Specimen:  Blood Updated:  09/07/18 2213     Glucose 121 (H) mg/dL      BUN 27 (H) mg/dL      Creatinine 0.88 mg/dL      Sodium 134 (L) mmol/L      Potassium 4.0 mmol/L      Chloride 91 (L) mmol/L      CO2 26.1 mmol/L      Calcium 10.2 mg/dL      Total Protein 8.1 g/dL      Albumin 4.20 g/dL      ALT (SGPT) 8 U/L      AST (SGOT) 13 U/L      Alkaline Phosphatase 112 U/L      Total Bilirubin 0.7 mg/dL      eGFR Non African Amer 87 mL/min/1.73      Globulin 3.9 gm/dL      A/G Ratio 1.1 g/dL      BUN/Creatinine Ratio 30.7 (H)     Anion Gap 16.9 mmol/L     Protime-INR [[id number]]  (Normal) Collected:  09/07/18 2126    Specimen:  Blood Updated:  09/07/18 2145     Protime 14.0 Seconds      INR 1.10    aPTT [[id number]]  (Normal) Collected:  09/07/18 2126    Specimen:  Blood Updated:  09/07/18 2145     PTT 33.5 seconds     BNP [[id number]]  (Normal) Collected:  09/07/18 2126    Specimen:  Blood Updated:  09/07/18 2220     proBNP 628.6 pg/mL     Narrative:       Among patients with dyspnea, NT-proBNP is highly sensitive for the detection of acute congestive heart failure. In addition NT-proBNP of <300 pg/ml effectively rules out acute congestive heart failure with 99% negative predictive value.    Troponin [[id number]]  (Normal) Collected:  09/07/18 2126    Specimen:  Blood Updated:  09/07/18 2220     Troponin T <0.010 ng/mL     Narrative:       Troponin T Reference  Ranges:  Less than 0.03 ng/mL:    Negative for AMI  0.03 to 0.09 ng/mL:      Indeterminant for AMI  Greater than 0.09 ng/mL: Positive for AMI    Ethanol [[id number]] Collected:  09/07/18 2126    Specimen:  Blood Updated:  09/07/18 2213     Ethanol <10 mg/dL      Ethanol % <0.010 %     Magnesium [[id number]]  (Normal) Collected:  09/07/18 2126    Specimen:  Blood Updated:  09/07/18 2212     Magnesium 2.1 mg/dL     TSH [[id number]]  (Normal) Collected:  09/07/18 2126    Specimen:  Blood Updated:  09/07/18 2220     TSH 3.130 mIU/mL     T4, Free [[id number]]  (Normal) Collected:  09/07/18 2126    Specimen:  Blood Updated:  09/07/18 2220     Free T4 1.43 ng/dL     CBC Auto Differential [[id number]]  (Abnormal) Collected:  09/07/18 2126    Specimen:  Blood Updated:  09/07/18 2136     WBC 13.41 (H) 10*3/mm3      RBC 4.59 (L) 10*6/mm3      Hemoglobin 14.2 g/dL      Hematocrit 42.8 %      MCV 93.2 fL      MCH 30.9 pg      MCHC 33.2 g/dL      RDW 11.7 %      RDW-SD 39.4 fl      MPV 11.0 fL      Platelets 412 10*3/mm3      Neutrophil % 81.1 (H) %      Lymphocyte % 12.5 (L) %      Monocyte % 6.0 %      Eosinophil % 0.1 (L) %      Basophil % 0.1 %      Immature Grans % 0.2 %      Neutrophils, Absolute 10.86 (H) 10*3/mm3      Lymphocytes, Absolute 1.68 10*3/mm3      Monocytes, Absolute 0.80 10*3/mm3      Eosinophils, Absolute 0.02 10*3/mm3      Basophils, Absolute 0.02 10*3/mm3      Immature Grans, Absolute 0.03 10*3/mm3     Urine Drug Screen - Urine, Clean Catch [[id number]]  (Abnormal) Collected:  09/07/18 2217    Specimen:  Urine from Urine, Clean Catch Updated:  09/07/18 2250     Amphet/Methamphet, Screen Negative     Barbiturates Screen, Urine Negative     Benzodiazepine Screen, Urine Negative     Cocaine Screen, Urine Negative     Opiate Screen Positive (A)     THC, Screen, Urine Negative     Methadone Screen, Urine Negative     Oxycodone Screen, Urine Negative    Narrative:       Negative Thresholds For Drugs Screened:      Amphetamines               500 ng/ml   Barbiturates               200 ng/ml   Benzodiazepines            100 ng/ml   Cocaine                    300 ng/ml   Methadone                  300 ng/ml   Opiates                    300 ng/ml   Oxycodone                  100 ng/ml   THC                        50 ng/ml    The Normal Value for all drugs tested is negative. This report includes final unconfirmed screening results to be used for medical treatment purposes only. Unconfirmed results must not be used for non-medical purposes such as employment or legal testing. Clinical consideration should be applied to any drug of abuse test, particulary when unconfirmed results are used.          I ordered the above labs and reviewed the results    RADIOLOGY  XR Chest 1 View   Preliminary Result   No acute findings. Nodular opacities of the right lung base measuring up   to 2.5 cm, no significant change.               I ordered the above noted radiological studies. Interpreted by radiologist. Reviewed by me in PACS.       PROCEDURES  Critical Care  Performed by: ASHLEIGH LOU  Authorized by: ASHLEIGH LOU     Critical care provider statement:     Critical care time (minutes):  40    Critical care time was exclusive of:  Separately billable procedures and treating other patients    Critical care was necessary to treat or prevent imminent or life-threatening deterioration of the following conditions:  Cardiac failure    Critical care was time spent personally by me on the following activities:  Development of treatment plan with patient or surrogate, discussions with consultants, evaluation of patient's response to treatment, examination of patient, obtaining history from patient or surrogate, ordering and performing treatments and interventions, ordering and review of laboratory studies, ordering and review of radiographic studies, pulse oximetry, re-evaluation of patient's condition and review of old charts          EKG            EKG time: 2107  Rhythm/Rate: atrial flutter rate of 170   QRS, axis: LVH   ST and T waves: nonspecific ST changes    Interpreted Contemporaneously by me, independently viewed  changed compared to prior 8/30/18.      PROGRESS AND CONSULTS      2105  Ordered EKG for further viewing    2112  Ordered 6 mg of adenosine for tachycardia. Discussed what the pt should expect during adenosine.    2117  Ordered labs and chest XR for further viewing. Ordered Cardizem for tachycardia.    2223  Ordered cardizem for tachycardia.  Ordered percocet for pain.    2226  Ordered call from [facility].    2244  Pt has converted and is now back in sinus rhythm. Ordered repeat EKG.    2246  Pt has returned to atrial flutter but now with a slower rate (130)    2314  Discussed case with Dr Cheema, [facility]  Reviewed history, exam, results and treatments.   Dr Cheema recommends giving the pt Lopressor and calling medicine to admit.  Ordered Lopressor per Dr Cheema's recommendation.  Ordered call from A    2317  Rechecked patient who is resting. Discussed all lab and test results. Discussed plan to admit the pt. Pt understands and agrees with the plan. All questions have been answered.    2331  Pt converted for a short period of time after lopressor and then returned to atrial flutter but with a slower rate.    2332  Discussed case with Jayla Nurse Practitioner for [facility]  Reviewed history, exam, results and treatments.  Discussed concerns and plan of care. Jayla accepts pt to be admitted to telemetry.    2336  Pt heart rate is now in the 70s. Will continue to monitor.     MEDICAL DECISION MAKING  Results were reviewed/discussed with the patient and they were also made aware of online access. Pt also made aware that some labs, such as cultures, will not be resulted during ER visit and follow up with PMD is necessary.     MDM  Number of Diagnoses or Management Options  Typical atrial flutter (CMS/[facility]):      Amount and/or Complexity of Data  Reviewed  Clinical lab tests: ordered and reviewed (Troponin Negative, WBC 13.41, Sodium 134)  Tests in the radiology section of CPT®: ordered and reviewed (Chest XR - NAD)  Tests in the medicine section of CPT®: ordered and reviewed (Refer to the procedure section of the note for EKG results)  Decide to obtain previous medical records or to obtain history from someone other than the patient: yes (EPIC)  Review and summarize past medical records: yes (Admitted 8/30/18 - 9/1/18 for hematuria, rib fracture, and lung nodules. Pt had an abd CT that showed lesions  In the lever.)  Discuss the patient with other providers: yes (Jayla Nurse Practitioner)           DIAGNOSIS  Final diagnoses:   Typical atrial flutter (CMS/HCC) new onset with rapid response       DISPOSITION  ADMISSION    Discussed treatment plan and reason for admission with pt/family and admitting physician.  Pt/family voiced understanding of the plan for admission for further testing/treatment as needed.         Latest Documented Vital Signs:  As of 11:42 PM  BP- 113/79 HR- (!) 170 Temp- 98.5 °F (36.9 °C) (Tympanic) O2 sat- 93%    --  Documentation assistance provided by abhi Muro for Dr Caldwell.  Information recorded by the scribe was done at my direction and has been verified and validated by me.          Nicolasa Muro  09/07/18 0951       Alo Caldwell MD  09/07/18 7618

## 2018-09-08 NOTE — H&P
Name: Beltran Leo ADMIT: 2018   : 1955  PCP: Provider, No Known    MRN: [id number] LOS: 1 days   AGE/SEX: 63 y.o. male  ROOM: 445/1     Chief Complaint   Patient presents with   • Pain        History of Present Illness  62 yo male who presented to the ER after the sudden onset of racing heart. He was found to be in atrial flutter with a heartrate in the 170's. He was given adenosine and metoprolol and then started on a diltiazem drip. At this point he has now converted to NSR. Presently he denies any CP or SOA.   He was recently admitted after he presented with left hip pain and was found to have a right lung mass. He also was found to have multiple lesions in his liver that were suspicious for metastatic disease. A biopsy was done of one of the liver lesions. The path did not show any cancer however. He has some chronic cough but no worse than usual.      Past Medical History:   Diagnosis Date   • Abnormal CT of the abdomen    • H/O Liver lesions    • H/O Lung nodule      Past Surgical History:   Procedure Laterality Date   • LIVER BIOPSY  2018    CT guided       Allergies:  Patient has no known allergies.    Prescriptions Prior to Admission   Medication Sig Dispense Refill Last Dose   • HYDROcodone-acetaminophen (NORCO) 7.5-325 MG per tablet Take 1 tablet by mouth Every 4 (Four) Hours As Needed for Moderate Pain . 108 tablet 0        Social History   Substance Use Topics   • Smoking status: Current Every Day Smoker   • Smokeless tobacco: Not on file   • Alcohol use No       Family History   Problem Relation Age of Onset   • Heart attack Mother    • Heart attack Father        Review of Systems   Constitutional: weight stable. Appetite good. No recent fevers or chills.  HEENT: No vision changes. No rhinorrhea, sore throat.  Not hard of hearing.   Respiratory: see HPI  Cardiovascular: No problems with edema  Gastrointestinal: No problems with heartburn or indigestion. Bowel movements normal. No  blood noted in stools. No melena  Endocrine:  no diabetes   Genitourinary: No difficulty urinating, dysuria or frequency.   Musculoskeletal: No arthralgias or myalgias.   Skin: No rash or wound.   Neurological: Negative for syncope or headaches. No paresthesias or focal weakness  Hematological:  Does not bruise/bleed easily. No h/o DVTs  Psychiatric/Behavioral: No confusion. The patient is not nervous/anxious.       Objective    Vital Signs  Temp:  [97.6 °F (36.4 °C)-98.6 °F (37 °C)] 97.6 °F (36.4 °C)  Heart Rate:  [] 78  Resp:  [16-18] 16  BP: (100-148)/(72-94) 133/82  SpO2:  [93 %-98 %] 96 %  on   ;   Device (Oxygen Therapy): room air  Body mass index is 23.24 kg/m².    Physical Exam   WDWN male in NAD  PERRL, EOMI, sclera nonicteric. Oropharynx benign, tongue midline, palate elevates symmetrically. Neck supple without adenopathy or thyromegaly. No JVD.  Lungs clear initially but then developed expiratory wheeze. Breathing nonlabored  Heart RRR without murmur, gallop or rub.   Abdomen soft, nontender, bowel sounds present throughout.   Extensive ecchymosis over left flank area  Extremities no cyanosis, clubbing or edema.   Skin warm & dry  Neuro no gross motor deficits, alert & oriented. Speech fluent.       Results Review:   I reviewed the patient's new clinical results.    Lab Results (last 24 hours)     Procedure Component Value Units Date/Time    CBC & Differential [[id number]] Collected:  09/07/18 2126    Specimen:  Blood Updated:  09/07/18 2136    Narrative:       The following orders were created for panel order CBC & Differential.  Procedure                               Abnormality         Status                     ---------                               -----------         ------                     CBC Auto Differential[[id number]]        Abnormal            Final result                 Please view results for these tests on the individual orders.    Comprehensive Metabolic Panel [[id number]]   (Abnormal) Collected:  09/07/18 2126    Specimen:  Blood Updated:  09/07/18 2213     Glucose 121 (H) mg/dL      BUN 27 (H) mg/dL      Creatinine 0.88 mg/dL      Sodium 134 (L) mmol/L      Potassium 4.0 mmol/L      Chloride 91 (L) mmol/L      CO2 26.1 mmol/L      Calcium 10.2 mg/dL      Total Protein 8.1 g/dL      Albumin 4.20 g/dL      ALT (SGPT) 8 U/L      AST (SGOT) 13 U/L      Alkaline Phosphatase 112 U/L      Total Bilirubin 0.7 mg/dL      eGFR Non African Amer 87 mL/min/1.73      Globulin 3.9 gm/dL      A/G Ratio 1.1 g/dL      BUN/Creatinine Ratio 30.7 (H)     Anion Gap 16.9 mmol/L     Protime-INR [[id number]]  (Normal) Collected:  09/07/18 2126    Specimen:  Blood Updated:  09/07/18 2145     Protime 14.0 Seconds      INR 1.10    aPTT [[id number]]  (Normal) Collected:  09/07/18 2126    Specimen:  Blood Updated:  09/07/18 2145     PTT 33.5 seconds     BNP [[id number]]  (Normal) Collected:  09/07/18 2126    Specimen:  Blood Updated:  09/07/18 2220     proBNP 628.6 pg/mL     Narrative:       Among patients with dyspnea, NT-proBNP is highly sensitive for the detection of acute congestive heart failure. In addition NT-proBNP of <300 pg/ml effectively rules out acute congestive heart failure with 99% negative predictive value.    Troponin [[id number]]  (Normal) Collected:  09/07/18 2126    Specimen:  Blood Updated:  09/07/18 2220     Troponin T <0.010 ng/mL     Narrative:       Troponin T Reference Ranges:  Less than 0.03 ng/mL:    Negative for AMI  0.03 to 0.09 ng/mL:      Indeterminant for AMI  Greater than 0.09 ng/mL: Positive for AMI    Ethanol [[id number]] Collected:  09/07/18 2126    Specimen:  Blood Updated:  09/07/18 2213     Ethanol <10 mg/dL      Ethanol % <0.010 %     Magnesium [[id number]]  (Normal) Collected:  09/07/18 2126    Specimen:  Blood Updated:  09/07/18 2212     Magnesium 2.1 mg/dL     TSH [[id number]]  (Normal) Collected:  09/07/18 2126    Specimen:  Blood Updated:  09/07/18 2220     TSH 3.130 mIU/mL      T4, Free [[id number]]  (Normal) Collected:  09/07/18 2126    Specimen:  Blood Updated:  09/07/18 2220     Free T4 1.43 ng/dL     CBC Auto Differential [[id number]]  (Abnormal) Collected:  09/07/18 2126    Specimen:  Blood Updated:  09/07/18 2136     WBC 13.41 (H) 10*3/mm3      RBC 4.59 (L) 10*6/mm3      Hemoglobin 14.2 g/dL      Hematocrit 42.8 %      MCV 93.2 fL      MCH 30.9 pg      MCHC 33.2 g/dL      RDW 11.7 %      RDW-SD 39.4 fl      MPV 11.0 fL      Platelets 412 10*3/mm3      Neutrophil % 81.1 (H) %      Lymphocyte % 12.5 (L) %      Monocyte % 6.0 %      Eosinophil % 0.1 (L) %      Basophil % 0.1 %      Immature Grans % 0.2 %      Neutrophils, Absolute 10.86 (H) 10*3/mm3      Lymphocytes, Absolute 1.68 10*3/mm3      Monocytes, Absolute 0.80 10*3/mm3      Eosinophils, Absolute 0.02 10*3/mm3      Basophils, Absolute 0.02 10*3/mm3      Immature Grans, Absolute 0.03 10*3/mm3     Urine Drug Screen - Urine, Clean Catch [[id number]]  (Abnormal) Collected:  09/07/18 2217    Specimen:  Urine from Urine, Clean Catch Updated:  09/07/18 2250     Amphet/Methamphet, Screen Negative     Barbiturates Screen, Urine Negative     Benzodiazepine Screen, Urine Negative     Cocaine Screen, Urine Negative     Opiate Screen Positive (A)     THC, Screen, Urine Negative     Methadone Screen, Urine Negative     Oxycodone Screen, Urine Negative    Narrative:       Negative Thresholds For Drugs Screened:     Amphetamines               500 ng/ml   Barbiturates               200 ng/ml   Benzodiazepines            100 ng/ml   Cocaine                    300 ng/ml   Methadone                  300 ng/ml   Opiates                    300 ng/ml   Oxycodone                  100 ng/ml   THC                        50 ng/ml    The Normal Value for all drugs tested is negative. This report includes final unconfirmed screening results to be used for medical treatment purposes only. Unconfirmed results must not be used for non-medical purposes such as  employment or legal testing. Clinical consideration should be applied to any drug of abuse test, particulary when unconfirmed results are used.    Basic Metabolic Panel [[id number]]  (Abnormal) Collected:  09/08/18 0529    Specimen:  Blood Updated:  09/08/18 0615     Glucose 106 (H) mg/dL      BUN 31 (H) mg/dL      Creatinine 0.88 mg/dL      Sodium 135 (L) mmol/L      Potassium 4.1 mmol/L      Chloride 92 (L) mmol/L      CO2 27.2 mmol/L      Calcium 10.1 mg/dL      eGFR Non African Amer 87 mL/min/1.73      BUN/Creatinine Ratio 35.2 (H)     Anion Gap 15.8 mmol/L     Narrative:       GFR Normal >60  Chronic Kidney Disease <60  Kidney Failure <15    CBC (No Diff) [[id number]]  (Abnormal) Collected:  09/08/18 0529    Specimen:  Blood Updated:  09/08/18 0603     WBC 11.33 (H) 10*3/mm3      RBC 4.33 (L) 10*6/mm3      Hemoglobin 13.1 (L) g/dL      Hematocrit 40.4 %      MCV 93.3 fL      MCH 30.3 pg      MCHC 32.4 (L) g/dL      RDW 11.6 %      RDW-SD 39.5 fl      MPV 10.9 fL      Platelets 369 10*3/mm3             Results from last 7 days  Lab Units 09/08/18  0529 09/07/18 2126   WBC 10*3/mm3 11.33* 13.41*   HEMOGLOBIN g/dL 13.1* 14.2   PLATELETS 10*3/mm3 369 412       Results from last 7 days  Lab Units 09/08/18  0529 09/07/18 2126   SODIUM mmol/L 135* 134*   POTASSIUM mmol/L 4.1 4.0   CHLORIDE mmol/L 92* 91*   CO2 mmol/L 27.2 26.1   BUN mg/dL 31* 27*   CREATININE mg/dL 0.88 0.88   GLUCOSE mg/dL 106* 121*   ALBUMIN g/dL  --  4.20   BILIRUBIN mg/dL  --  0.7   ALK PHOS U/L  --  112   AST (SGOT) U/L  --  13   ALT (SGPT) U/L  --  8   Estimated Creatinine Clearance: 79.4 mL/min (by C-G formula based on SCr of 0.88 mg/dL).    Results from last 7 days  Lab Units 09/07/18  2126   INR  1.10   TROPONIN T ng/mL <0.010   PROBNP pg/mL 628.6         Invalid input(s): LDLCALC      XR Chest 1 View   Preliminary Result   No acute findings. Nodular opacities of the right lung base measuring up   to 2.5 cm, no significant change.             Assessment/Plan   Assessment:      Active Hospital Problems    Diagnosis Date Noted   • **Typical atrial flutter (CMS/HCC) [I48.3] 09/07/2018   • Left rib fracture [S22.32XA] 09/08/2018   • Wheezes [R06.2] 09/08/2018   • Lung mass [R91.8] 08/31/2018      Resolved Hospital Problems    Diagnosis Date Noted Date Resolved   No resolved problems to display.       Plan:     Cardiology has been consulted to see the patient. He has been NSR for several hours now so I am going to stop the diltiazem drip and start him on po diltiazem. I discussed anticoagulation with the patient. His liver biopsy did not show cancer but the lung nodule is very suspicious for a malignancy. I'm going to ask pulmonary to see him to see if he needs a bronch done before starting the anticoagulation or another attempt at biopsy.   I'm also going to start duonebs because of the wheezing.            Jerica Hou MD  [facility]  09/08/18  1:15 PM

## 2018-09-08 NOTE — CONSULTS
Patient Name: Beltran Leo  :1955  63 y.o.    Date of Admission: 2018  Date of Consultation:  18  Encounter Provider: Margarita Bello, RN EXTENDER  Place of Service: [facility] CARDIOLOGY  Referring Provider: Joseph Ramirez MD  Patient Care Team:  Provider, No Known as PCP - Eliot Wray MD as Consulting Physician (Hematology and Oncology)      Chief complaint: tachycardia     History of Present Illness:  .     63-year-old gentleman who was recently admitted for left hip pain.  Patient had a possible lung mass.  He was discharged home after work up.  Repeat presented complaining of palpitations.  Patient said he never felt anything like this before.  He is found to be in atrial fibrillation with a rapid rate.  He was placed on Cardizem drip and overnight approximately 2 AM he converted back to sinus rhythm remains in a sinus rhythm this morning.  Patient says he feels back to normal with no complaints.  No chest pain shortness of breath lower extremity edema no history of syncope near syncope.  Past Medical History:   Diagnosis Date   • Abnormal CT of the abdomen    • H/O Liver lesions    • H/O Lung nodule        Past Surgical History:   Procedure Laterality Date   • LIVER BIOPSY  2018    CT guided         Prior to Admission medications    Medication Sig Start Date End Date Taking? Authorizing Provider   HYDROcodone-acetaminophen (NORCO) 7.5-325 MG per tablet Take 1 tablet by mouth Every 4 (Four) Hours As Needed for Moderate Pain . 18   Madison Lombardo MD       No Known Allergies    Social History     Social History   • Marital status: Single     Occupational History   •  [employer]     Social History Main Topics   • Smoking status: Current Every Day Smoker   • Alcohol use No   • Drug use: No     Other Topics Concern   • Not on file       Family History   Problem Relation Age of Onset   • Heart attack Mother    • Heart  attack Father        REVIEW OF SYSTEMS:   All systems reviewed.  Pertinent positives identified in HPI.  All other systems are negative.      Objective:     Vitals:    09/08/18 0156 09/08/18 0228 09/08/18 0329 09/08/18 0715   BP: 126/72 126/94 122/87 148/76   BP Location:    Left arm   Patient Position:    Lying   Pulse:    78   Resp:    18   Temp:    98.6 °F (37 °C)   TempSrc:    Oral   SpO2:    95%   Weight:       Height:         Body mass index is 23.24 kg/m².    General Appearance:    Alert, cooperative, in no acute distress   Head:    Normocephalic, without obvious abnormality, atraumatic   Eyes:            Lids and lashes normal, conjunctivae and sclerae normal, no   icterus, no pallor, corneas clear, PERRLA   Ears:    Ears appear intact with no abnormalities noted   Throat:   No oral lesions, no thrush, oral mucosa moist   Neck:   No adenopathy, supple, trachea midline, no thyromegaly, no   carotid bruit, no JVD   Back:     No kyphosis present, no scoliosis present, no skin lesions, erythema or scars, no tenderness to percussion or palpation, range of motion normal   Lungs:     Clear to auscultation,respirations regular, even and unlabored    Heart:    Regular rhythm and normal rate, normal S1 and S2, no murmur, no gallop, no rub, no click   Chest Wall:    No abnormalities observed   Abdomen:     Normal bowel sounds, no masses, no organomegaly, soft        non-tender, non-distended, no guarding, no rebound  tenderness   Extremities:   Moves all extremities well, no edema, no cyanosis, no redness   Pulses:   Pulses palpable and equal bilaterally. Normal radial, carotid, femoral, dorsalis pedis and posterior tibial pulses bilaterally. Normal abdominal aorta   Skin:  Psychiatric:   No bleeding, bruising or rash    Alert and oriented x 3, normal mood and affect   Lab Review:       Results from last 7 days  Lab Units 09/08/18  0529 09/07/18  2126   SODIUM mmol/L 135* 134*   POTASSIUM mmol/L 4.1 4.0   CHLORIDE  mmol/L 92* 91*   CO2 mmol/L 27.2 26.1   BUN mg/dL 31* 27*   CREATININE mg/dL 0.88 0.88   CALCIUM mg/dL 10.1 10.2   BILIRUBIN mg/dL  --  0.7   ALK PHOS U/L  --  112   ALT (SGPT) U/L  --  8   AST (SGOT) U/L  --  13   GLUCOSE mg/dL 106* 121*       Results from last 7 days  Lab Units 09/07/18  2126   TROPONIN T ng/mL <0.010       Results from last 7 days  Lab Units 09/08/18  0529   WBC 10*3/mm3 11.33*   HEMOGLOBIN g/dL 13.1*   HEMATOCRIT % 40.4   PLATELETS 10*3/mm3 369       Results from last 7 days  Lab Units 09/07/18 2126   INR  1.10   APTT seconds 33.5       Results from last 7 days  Lab Units 09/07/18 2126   MAGNESIUM mg/dL 2.1                  Telemetry:      EKG:        I personally viewed and interpreted the patient's EKG/Telemetry data.        Assessment and Plan:       1.  New onset atrial fibrillation.  Patient has converted to normal sinus rhythm.  Echo is currently pending.  We'll discontinue Cardizem drip patient was placed nothing by mouth.  He had wheezing yesterday and Dr. Hou had ordered for pulmonary to see.  Patient's chads scoring is actually 0 with a low risk of thromboembolic event.  I'm not sure I would anticoagulate beyond an aspirin if his echocardiogram is normal.  I would also really like to use a beta blocker if cleared by pulmonary.  He was not wheezing on today's physical exam.    Margarita Bello RN EXTENDER  09/08/18  11:48 AM      Simone Cheema MD  [facility]  9/8/2018 1:34 PM

## 2018-09-09 PROBLEM — E87.1 HYPONATREMIA: Status: ACTIVE | Noted: 2018-01-01

## 2018-09-09 NOTE — PLAN OF CARE
Problem: Patient Care Overview  Goal: Plan of Care Review   09/09/18 0110   OTHER   Outcome Summary Pt complains of constant pain in his left side. Pt went back in Afib RVR but asymptomatic. Pt then converted back to SR and did the same thing again. Pt waiting for echo. VSS. Continue to monitor. Safety maintained.

## 2018-09-09 NOTE — PROGRESS NOTES
Hospital Follow Up    LOS:  LOS: 2 days   Patient Name: Beltran Leo  Age/Sex: 63 y.o. male  : 1955  MRN: [id number]    Day of Service: 18   Length of Stay: 2  Encounter Provider: Simone Cheema MD  Place of Service: [facility] CARDIOLOGY  Patient Care Team:  Provider, No Known as PCP - Eliot Wray MD as Consulting Physician (Hematology and Oncology)    Subjective:     Chief Complaint: Chest pain when lying on the left side./Atrial fibrillation    Interval History: Patient more clinically alert today.  His blood pressures fluctuating an enormous amount.  He complains of chest discomfort on left side when he lays on that side not with a deep breath.    Objective:     Objective:  Temp:  [97.6 °F (36.4 °C)-98.9 °F (37.2 °C)] 98.4 °F (36.9 °C)  Heart Rate:  [] 90  Resp:  [16-20] 20  BP: (122-142)/(75-89) 137/78     Intake/Output Summary (Last 24 hours) at 18 0926  Last data filed at 18 1245   Gross per 24 hour   Intake               60 ml   Output                0 ml   Net               60 ml     Body mass index is 23.24 kg/m².  1    18  0100 18  0101   Weight: 70.9 kg (156 lb 3.2 oz) 65.3 kg (143 lb 15.4 oz)     Weight change:       Physical Exam:   General : Alert, cooperative, in no acute distress.  Neuro: alert,cooperative and oriented  Lungs: CTAB. Normal respiratory effort and rate.  CV:: irregular rate and rhythm, normal S1 and S2, no murmurs, gallops or rubs.  ABD: Soft, nontender, non-distended. positive bowel sounds  Extr: No edema or cyanosis, moves all extremities    Lab Review:     Results from last 7 days  Lab Units 18  0529 186   SODIUM mmol/L 135* 134*   POTASSIUM mmol/L 4.1 4.0   CHLORIDE mmol/L 92* 91*   CO2 mmol/L 27.2 26.1   BUN mg/dL 31* 27*   CREATININE mg/dL 0.88 0.88   GLUCOSE mg/dL 106* 121*   CALCIUM mg/dL 10.1 10.2   AST (SGOT) U/L  --  13   ALT (SGPT) U/L  --  8       Results  from last 7 days  Lab Units 09/07/18 2126   TROPONIN T ng/mL <0.010       Results from last 7 days  Lab Units 09/08/18  0529 09/07/18 2126   WBC 10*3/mm3 11.33* 13.41*   HEMOGLOBIN g/dL 13.1* 14.2   HEMATOCRIT % 40.4 42.8   PLATELETS 10*3/mm3 369 412       Results from last 7 days  Lab Units 09/07/18 2126   INR  1.10   APTT seconds 33.5       Results from last 7 days  Lab Units 09/07/18 2126   MAGNESIUM mg/dL 2.1           Invalid input(s): LDLCALC    Results from last 7 days  Lab Units 09/07/18 2126   PROBNP pg/mL 628.6       Results from last 7 days  Lab Units 09/07/18 2126   TSH mIU/mL 3.130     I reviewed the patient's new clinical results.  I personally viewed and interpreted the patient's EKG  Current Medications:   Scheduled Meds:  diltiaZEM  mg Oral Q24H   ipratropium-albuterol 3 mL Nebulization Q4H - RT     Continuous Infusions:     Allergies:  No Known Allergies    Assessment:     Principal Problem:    Typical atrial flutter (CMS/HCC)  Active Problems:    Lung mass    Left rib fracture    Wheezes        Plan:       1.  New onset atrial fibrillation.  Heart rates have been fluctuating an enormous amount.  Patient's on Cardizem will follow think a beta blocker would still be a better choice.   2.  Lung mass in a process of being worked up.  3.  Echo was just performed currently pending at time this dictation.  4.  Continue same for now we'll see what evolves.  5.  Chest pain secondary to rib fractures.    Simone Cheema MD  09/09/18  9:26 AM

## 2018-09-09 NOTE — PLAN OF CARE
Problem: Patient Care Overview  Goal: Plan of Care Review  Outcome: Ongoing (interventions implemented as appropriate)   09/08/18 2010   Coping/Psychosocial   Plan of Care Reviewed With patient   Plan of Care Review   Progress improving   OTHER   Outcome Summary patient is taking mininebs/improving

## 2018-09-09 NOTE — PROGRESS NOTES
Patient is resting  In NO distress  On RA. Denies productive cough  Positive chest pain , unchanged  Plan for CT guided lung biopsy today  Vital signs reviewed, no fever, no hypoxemia on RA  Lung: positive for rhonchi, right more than left  Labs noted  Plan : proceed with the biopsy, will follow up in am

## 2018-09-09 NOTE — PLAN OF CARE
Problem: Patient Care Overview  Goal: Plan of Care Review  Outcome: Ongoing (interventions implemented as appropriate)   09/09/18 0880   Coping/Psychosocial   Plan of Care Reviewed With patient   Plan of Care Review   Progress no change   OTHER   Outcome Summary pt has more pain today. refused activity with multiple attempts to get him to walk. pt also not eating discussed with DR mariee and awaiting orders.

## 2018-09-09 NOTE — PLAN OF CARE
Problem: Arrhythmia/Dysrhythmia (Symptomatic) (Adult)  Goal: Signs and Symptoms of Listed Potential Problems Will be Absent, Minimized or Managed (Arrhythmia/Dysrhythmia)  Outcome: Ongoing (interventions implemented as appropriate)      Problem: Pain, Acute (Adult)  Goal: Acceptable Pain Control/Comfort Level  Outcome: Ongoing (interventions implemented as appropriate)

## 2018-09-09 NOTE — PLAN OF CARE
Problem: Patient Care Overview  Goal: Plan of Care Review   09/09/18 0103   OTHER   Outcome Summary Pt complains of knee pain and tylenol given. IV abx cont. VSS. Continue to monitor. Safety maintained.

## 2018-09-09 NOTE — PROGRESS NOTES
LOS: 2 days   Patient Care Team:  Provider, No Known as PCP - Eliot Wray MD as Consulting Physician (Hematology and Oncology)    Chief Complaint   Patient presents with   • Pain         Subjective   Having pain in both shoulders. Also left flank & down back of left leg.  Not eating hardly anything.     Objective     Vital Signs  Temp:  [98.2 °F (36.8 °C)-98.9 °F (37.2 °C)] 98.6 °F (37 °C)  Heart Rate:  [] 88  Resp:  [16-20] 20  BP: ()/(54-89) 128/81    Physical Exam:  WDWN male in NAD. Alert & oriented.  Lungs no wheezing. Scattered rhonchi  Heart RRR  Abd soft, NT, BS+  Ext no edema.  Skin warm & dry       Results Review:     I reviewed the patient's new clinical results.    Medication Review:       LABS    Results from last 7 days  Lab Units 09/09/18 1948 09/08/18  0529 09/07/18  2126   SODIUM mmol/L 132* 135* 134*   POTASSIUM mmol/L 3.9 4.1 4.0   CHLORIDE mmol/L 93* 92* 91*   CO2 mmol/L 26.6 27.2 26.1   BUN mg/dL 27* 31* 27*   CREATININE mg/dL 0.91 0.88 0.88   GLUCOSE mg/dL 134* 106* 121*   CALCIUM mg/dL 9.7 10.1 10.2       Results from last 7 days  Lab Units 09/08/18  0529 09/07/18  2126   WBC 10*3/mm3 11.33* 13.41*   HEMOGLOBIN g/dL 13.1* 14.2   HEMATOCRIT % 40.4 42.8   PLATELETS 10*3/mm3 369 412       Results from last 7 days  Lab Units 09/07/18  2126   INR  1.10           Assessment/Plan     Principal Problem:    Typical atrial flutter (CMS/HCC) - HR still really high at times  Active Problems:    Lung mass    Left rib fracture    Wheezes    Pain - this is new from yesterday & he seems like he is very uncomfortable. Will check bone survey & bone scan. Increase pain meds    Leukocytosis - recheck    Hyponatremia - cont to monitor          Jerica Hou MD  09/09/18  9:34 PM      Time:

## 2018-09-09 NOTE — NURSING NOTE
Pt has wheezing today. Spoke with patient about getting up to the chair and walking around. He layed in bed all day yesterday and refused activity. Told him the importance of getting OOB due to risk of PN and blood clots. Pt says he will get up but has yet to do it with any of my attempts.

## 2018-09-09 NOTE — PLAN OF CARE
Problem: Patient Care Overview  Goal: Plan of Care Review  Outcome: Ongoing (interventions implemented as appropriate)      Problem: Arrhythmia/Dysrhythmia (Symptomatic) (Adult)  Goal: Signs and Symptoms of Listed Potential Problems Will be Absent, Minimized or Managed (Arrhythmia/Dysrhythmia)  Outcome: Ongoing (interventions implemented as appropriate)      Problem: Pain, Acute (Adult)  Goal: Acceptable Pain Control/Comfort Level  Outcome: Ongoing (interventions implemented as appropriate)

## 2018-09-10 PROBLEM — C79.51 METASTASIS TO BONE (HCC): Status: ACTIVE | Noted: 2018-01-01

## 2018-09-10 PROBLEM — I48.92 ATRIAL FLUTTER WITH RAPID VENTRICULAR RESPONSE (HCC): Status: ACTIVE | Noted: 2018-01-01

## 2018-09-10 NOTE — CONSULTS
Adult Nutrition  Assessment/PES    Patient Name:  Beltran Leo  YOB: 1955  MRN: [id number]  Admit Date:  9/7/2018    Assessment Date:  9/10/2018    Nutrition assessment triggered by MST score-5 and nursing consult. Patient off floor at this time in radiology.  Mighty debbieneema nutrition supplement ordered TID.  RD to follow up to interview.          Reason for Assessment     Row Name 09/10/18 1336          Reason for Assessment    Reason For Assessment nurse/nurse practitioner consult     Diagnosis   Primary Problem:  Typical atrial flutter (CMS/HCC)      Identified At Risk by Screening Criteria MST SCORE 2+;reduced oral intake over the last month;unintentional loss of 10 lbs or more in the past 2 mos               Anthropometrics     Row Name 09/10/18 9681          Body Mass Index (BMI)    BMI Assessment BMI 18.5-24.9: normal             Labs/Tests/Procedures/Meds     Row Name 09/10/18 1330          Labs/Procedures/Meds    Lab Results Reviewed reviewed, pertinent        Diagnostic Tests/Procedures    Diagnostic Test/Procedure Reviewed reviewed, pertinent        Medications    Pertinent Medications Reviewed reviewed, pertinent     Pertinent Medications Comments PPI             Physical Findings     Row Name 09/10/18 1332          Physical Findings    Overall Physical Appearance --   B=19             Estimated/Assessed Needs     Row Name 09/10/18 1336          Calculation Measurements    Weight Used For Calculations 65.3 kg (143 lb 15.4 oz)        Estimated/Assessed Needs    Additional Documentation KCAL/KG (Group);Protein Requirements (Group);Fluid Requirements (Group)        KCAL/KG                                                                kcal/kg (Specify) --   3049-5422        [place]-St. Jeor Equation    RMR ([place]-St. Jeor Equation) 1390.75        Protein Requirements    Est Protein Requirement Amount (gms/kg) 1.4 gm protein     Estimated Protein Requirements (gms/day) 91.42        Fluid  Requirements    Estimated Fluid Requirements (mL/day) 1900     RDA Method (mL) 1900     Jeffery-Efra Method (over 20 kg) 2806             Nutrition Prescription Ordered     Row Name 09/10/18 1337          Nutrition Prescription PO    Common Modifiers Cardiac             Evaluation of Received Nutrient/Fluid Intake     Row Name 09/10/18 1337          Calculation Measurements    Weight Used For Calculations 65.3 kg (143 lb 15.4 oz)        PO Evaluation    Number of Meals 3     % PO Intake 25             Evaluation of Prescribed Nutrient/Fluid Intake     Row Name 09/10/18 1337          Calculation Measurements    Weight Used For Calculations 65.3 kg (143 lb 15.4 oz)           Problem/Interventions:        Problem 1     Row Name 09/10/18 1338          Nutrition Diagnoses Problem 1    Problem 1 Inadequate Nutrient Intake     Etiology (related to) MNT for Treatment/Condition     Signs/Symptoms (evidenced by) Report of Mnimal PO Intake                     Intervention Goal     Row Name 09/10/18 1338          Intervention Goal    General Maintain nutrition;Meet nutritional needs for age/condition     PO Tolerate PO;Increase intake     Weight Maintain weight             Nutrition Intervention     Row Name 09/10/18 1334          Nutrition Intervention    RD/Tech Action Follow Tx progress;Care plan reviewd   patient off floor in radiology             Nutrition Prescription     Row Name 09/10/18 133          Nutrition Prescription PO    PO Prescription Begin/change supplement     Supplement Mighty Shake     Supplement Frequency 3 times a day     New PO Prescription Ordered? Yes             Education/Evaluation     Row Name 09/10/18 5992          Education    Education Will Instruct as appropriate        Monitor/Evaluation    Monitor Per protocol     Education Follow-up Reinforce PRN         Electronically signed by:  Wendy Kearns RD  09/10/18 1:38 PM

## 2018-09-10 NOTE — PROGRESS NOTES
Hospital Follow Up    LOS:  LOS: 0 days   Patient Name: Beltran Leo  Age/Sex: 63 y.o. male  : 1955  MRN: [id number]    Day of Service: 09/10/18   Length of Stay: 0  Encounter Provider: Simone Cheema MD  Place of Service: [facility] CARDIOLOGY  Patient Care Team:  Provider, No Known as PCP - Eliot Wray MD as Consulting Physician (Hematology and Oncology)    Subjective:     Chief Complaint: Atrial fibrillation/PVCs/lung medical    Interval History: Patient doing okay no new complaints overnight.    Objective:     Objective:  Temp:  [98 °F (36.7 °C)-98.6 °F (37 °C)] 98.2 °F (36.8 °C)  Heart Rate:  [] 84  Resp:  [20] 20  BP: ()/(54-93) 117/67     Intake/Output Summary (Last 24 hours) at 09/10/18 1057  Last data filed at 18 1309   Gross per 24 hour   Intake                0 ml   Output                0 ml   Net                0 ml     Body mass index is 23.24 kg/m².  1    18  0100 18  0101   Weight: 70.9 kg (156 lb 3.2 oz) 65.3 kg (143 lb 15.4 oz)     Weight change:       Physical Exam:   General : Alert, cooperative, in no acute distress.  Neuro: alert,cooperative and oriented  Lungs: CTAB. Normal respiratory effort and rate.  CV:: Regular rate and rhythm, normal S1 and S2, no murmurs, gallops or rubs.  ABD: Soft, nontender, non-distended. positive bowel sounds  Extr: No edema or cyanosis, moves all extremities    Lab Review:     Results from last 7 days  Lab Units 09/10/18  0542 18  1948  186   SODIUM mmol/L 133* 132*  < > 134*   POTASSIUM mmol/L 3.7 3.9  < > 4.0   CHLORIDE mmol/L 93* 93*  < > 91*   CO2 mmol/L 27.6 26.6  < > 26.1   BUN mg/dL 23 27*  < > 27*   CREATININE mg/dL 0.88 0.91  < > 0.88   GLUCOSE mg/dL 122* 134*  < > 121*   CALCIUM mg/dL 10.2 9.7  < > 10.2   AST (SGOT) U/L  --   --   --  13   ALT (SGPT) U/L  --   --   --  8   < > = values in this interval not displayed.    Results from last 7  days  Lab Units 09/07/18 2126   TROPONIN T ng/mL <0.010       Results from last 7 days  Lab Units 09/10/18  0542 09/08/18  0529   WBC 10*3/mm3 11.29* 11.33*   HEMOGLOBIN g/dL 12.4* 13.1*   HEMATOCRIT % 38.3* 40.4   PLATELETS 10*3/mm3 374 369       Results from last 7 days  Lab Units 09/07/18 2126   INR  1.10   APTT seconds 33.5       Results from last 7 days  Lab Units 09/07/18 2126   MAGNESIUM mg/dL 2.1           Invalid input(s): LDLCALC    Results from last 7 days  Lab Units 09/07/18 2126   PROBNP pg/mL 628.6       Results from last 7 days  Lab Units 09/07/18 2126   TSH mIU/mL 3.130     Current Medications:   Scheduled Meds:  diltiaZEM  mg Oral Q24H   ipratropium-albuterol 3 mL Nebulization Q4H - RT   pantoprazole 40 mg Oral Q AM     Continuous Infusions:     Allergies:  No Known Allergies    Assessment:     Principal Problem:    Typical atrial flutter (CMS/HCC)  Active Problems:    Lung mass    Left rib fracture    Wheezes    Hyponatremia        Plan:   1.  New onset atrial fibrillation.  Patient remains in sinus rhythm.  2.  Lung mass workup still in progress.  3.  Echocardiogram with normal LV function at a few PVCs.  We'll follow for now.  Patient remains asymptomatic        Simone Cheema MD  09/10/18  10:57 AM

## 2018-09-10 NOTE — PROGRESS NOTES
LOS: 0 days   Patient Care Team:  Provider, No Known as PCP - Eliot Wray MD as Consulting Physician (Hematology and Oncology)    Chief Complaint   Patient presents with   • Pain         Subjective   Change in pain meds has helped. Was having burning in chest earlier but that is presently better.  Not eating hardly anything.     Objective     Vital Signs  Temp:  [98 °F (36.7 °C)-98.6 °F (37 °C)] 98.5 °F (36.9 °C)  Heart Rate:  [] 70  Resp:  [16-20] 16  BP: (117-131)/(67-93) 122/81    Physical Exam:  WDWN male in NAD. Alert & oriented.  Lungs - scattered rhonchi with occasional short expiratory wheeze.  Heart RRR  Abd soft, NT, BS+  Ext no edema.  Skin warm & dry       Results Review:     I reviewed the patient's new clinical results.    Medication Review:       LABS    Results from last 7 days  Lab Units 09/10/18  0542 09/09/18  1948 09/08/18  0529   SODIUM mmol/L 133* 132* 135*   POTASSIUM mmol/L 3.7 3.9 4.1   CHLORIDE mmol/L 93* 93* 92*   CO2 mmol/L 27.6 26.6 27.2   BUN mg/dL 23 27* 31*   CREATININE mg/dL 0.88 0.91 0.88   GLUCOSE mg/dL 122* 134* 106*   CALCIUM mg/dL 10.2 9.7 10.1       Results from last 7 days  Lab Units 09/10/18  0542 09/08/18  0529 09/07/18 2126   WBC 10*3/mm3 11.29* 11.33* 13.41*   HEMOGLOBIN g/dL 12.4* 13.1* 14.2   HEMATOCRIT % 38.3* 40.4 42.8   PLATELETS 10*3/mm3 374 369 412       Results from last 7 days  Lab Units 09/07/18  2126   INR  1.10           Assessment/Plan     Principal Problem:    Atrial flutter with RVR - HR has been better today but when I was seeing him yesterday he was in the 160's. He's been off of the floor most of today getting bone scan & lung bx done so his HR hasn't been monitored as much today to know for sure that it will stay controlled.  His nurse has been taking care of him today reports that his heart rate was still getting high early this morning but this afternoon it has been better.  Some decision also needs to be made regarding  anticoagulation.  MRI of the brain has been ordered to rule out brain metastases so it will need to wait until the MRI is completed  Active Problems:    Lung mass - likely metastatic lung cancer. Discussed with pulmonary    Left rib fracture    Wheezes - improved with mininebs but still has congestion    Pain - bone scan is c/w mult areas of metastatic disease.  Pain is better with the increased dose of pain medication but he is very drowsy.  Discussed with Dr. Rudolph.  He is going to order Zometa for him.    Leukocytosis - better.    Hyponatremia - cont to monitor.  Urine Na and osmolality pending but this is likely SIADH    Total time over 30 min        Jerica Hou MD  09/10/18  3:03 PM      Time:

## 2018-09-10 NOTE — CONSULTS
.     REASON FOR CONSULTATION:     Provide an opinion on any further workup or treatment of metastatic lung cancer.                              REQUESTING PHYSICIAN: Joseph Ramirez MD     RECORDS OBTAINED:  Records of the patients history including those obtained from the referring provider were reviewed and summarized in detail.    HISTORY OF PRESENT ILLNESS:  The patient is a 63 y.o. year old male who was admitted this time on 09/07/2018 because of sudden onset atrial flutter with palpitation. This patient was evaluated in the ER with heart rate in the 170s. He was given adenosine and metoprolol and then started on diltiazem drip. He was converted to normal sinus rhythm.     This patient was recently hospitalized in the end of 08/2018 because of left-sided rib pain. He was found having evidence of metastatic disease likely lung cancer with metastasis to the liver and bones.    He originally had CT scan examination on 08/30/2018 for the abnormal pelvis for evaluation of left hip pain/left flank and lower chest wall pain. That study reported multiple pulmonary nodules with dominant 2 cm spiculated nodule in the right lower lobe. There was also 3 cm mass in the right infrahilar region. There were multiple low-density liver lesions with largest one measuring 1.5 cm in the left hepatic lobe. There was also bilateral adrenal enlargement with 21 mm low-density mass in the right side suspicious for metastatic disease. CT examination on 08/31/2018 reported necrotic lymph nodes in the right hilum, largest 2.2 cm. The right lower lobe necrotic lesion measures 1.6 x 2.1 cm and right paratracheal space mediastinal lymph nodes measuring 2.2 cm. There was also evidence of metastatic bone disease in the left 4th rib and left 10th rib fracture.     He had CT-guided biopsy of the liver lesion on 08/31/2018. He was seen by Dr. Arriaga at that time for evaluation.  However, the liver biopsy turned out to be nondiagnostic with no  evidence of malignancy. So we arranged patient to have outpatient lung biopsy which was scheduled on 09/14/2018.      Nevertheless, patient was admitted to hospital again.  During this hospitalization, patient had bone survey yesterday on 09/09/2018, which reported abnormal lucencies in the skull, distal left 11th rib and proximal femoral bilaterally. Patient had bone scan this afternoon, which reported evidence of metastatic disease in the left proximal femur, left acromion with lytic lesion. There was also right distal femoral metaphysis lesion and faint uptake of the left maxilla lesion. The right manubrium is a lesion suspicious.     Patient had CT-guided right lower lobe pulmonary nodule biopsy this afternoon. Pathology results are pending.     Patient reports no headaches, no vision changes, no nausea, no vomiting. He has poor appetite. Patient reports he lost about 30 pounds in the past 4 months. He has poor appetite actually longer than that period of time. He continues to have pain in the left rib cage and also on lower extremity mostly involving the left hip area. The pain severity is 6 out of 10 currently.     Laboratory study reported patient is mildly anemic with hemoglobin 12.4, MCV 92.3, MCHC 32.4, platelets 374,000 and WBC 11,300 including neutrophils 9300, lymphocytes 1100 and monocytes 850. Chemistry lab reported creatinine 0.88. Normal electrolytes except sodium 133, glucose 122. Liver panel on 09/07/2018 was normal. He also had normal TSH and free T4. Normal PT-INR, PTT. His urine was positive for opiates on 09/07/2018.        Past Medical History:   Diagnosis Date   • Abnormal CT of the abdomen    • H/O Liver lesions    • H/O Lung nodule      Past Surgical History:   Procedure Laterality Date   • LIVER BIOPSY  08/31/2018    CT guided       HEMATOLOGIC/ONCOLOGIC HISTORY:  (History from previous dates can be found in the separate document.)    MEDICATIONS    Current Facility-Administered  Medications:   •  acetaminophen (TYLENOL) tablet 650 mg, 650 mg, Oral, Q4H PRN, Jayla Richard, APRN  •  acetaminophen (TYLENOL) tablet 650 mg, 650 mg, Oral, Q6H PRN, Jayla Richard APRN  •  albuterol (PROVENTIL) nebulizer solution 0.083% 2.5 mg/3mL, 2.5 mg, Nebulization, Once PRN, Padma Rossi MD  •  calcium carbonate (TUMS) chewable tablet 500 mg (200 mg elemental), 2 tablet, Oral, TID PRN, Shaggy Mary MD, 2 tablet at 09/10/18 0821  •  diltiaZEM CD (CARDIZEM CD) 24 hr capsule 240 mg, 240 mg, Oral, Q24H, Jerica Hou MD, 240 mg at 09/10/18 0820  •  HYDROcodone-acetaminophen (NORCO)  MG per tablet 1 tablet, 1 tablet, Oral, Q4H PRN, 1 tablet at 09/09/18 2046 **OR** HYDROcodone-acetaminophen (NORCO)  MG per tablet 2 tablet, 2 tablet, Oral, Q4H PRN, Jerica Hou MD, 2 tablet at 09/10/18 1541  •  ipratropium-albuterol (DUO-NEB) nebulizer solution 3 mL, 3 mL, Nebulization, Q4H - RT, Jerica Hou MD, 3 mL at 09/10/18 0726  •  nitroglycerin (NITROSTAT) SL tablet 0.4 mg, 0.4 mg, Sublingual, Q5 Min PRN, Jayla Richard, APRN  •  ondansetron (ZOFRAN) tablet 4 mg, 4 mg, Oral, Q6H PRN, 4 mg at 09/09/18 1648 **OR** ondansetron ODT (ZOFRAN-ODT) disintegrating tablet 4 mg, 4 mg, Oral, Q6H PRN **OR** ondansetron (ZOFRAN) injection 4 mg, 4 mg, Intravenous, Q6H PRN, Jayla Richard APRN  •  ondansetron (ZOFRAN) injection 4 mg, 4 mg, Intravenous, Q6H PRN, Jayla Richard, APRN  •  pantoprazole (PROTONIX) EC tablet 40 mg, 40 mg, Oral, Q AM, Jerica Hou MD, 40 mg at 09/10/18 1540  •  sodium chloride 0.9 % flush 1-10 mL, 1-10 mL, Intravenous, PRN, Jayla Richard, APRN  •  Insert peripheral IV, , , Once **AND** sodium chloride 0.9 % flush 10 mL, 10 mL, Intravenous, PRN, Alo Caldwlel MD    ALLERGIES:   No Known Allergies    SOCIAL HISTORY:       Social History     Social History   • Marital status: Single     Spouse name: N/A   • Number of children: None.    • Years of education:  N/A     Occupational History   •   [employer]     Social History Main Topics   • Smoking status: Current Every Day Smoker, at least 1 pack per day, has been smoking for 40 years.     • Smokeless tobacco: Not on file   • Alcohol use No   • Drug use: No   • Sexual activity: Not on file         FAMILY HISTORY:  Family History   Problem Relation Age of Onset   • Heart attack Mother    • Heart attack Father    Patient reports no family history for malignancy.     REVIEW OF SYSTEMS:  Review of Systems   Constitutional: Positive for appetite change, fatigue and unexpected weight change (About a 30 pounds in the past 4 months). Negative for chills, diaphoresis and fever.   HENT: Negative for congestion, hearing loss, nosebleeds, sinus pressure and voice change.    Eyes: Negative for visual disturbance.   Respiratory: Positive for cough (No hemoptysis). Negative for shortness of breath.    Cardiovascular: Positive for chest pain (Left chest wall) and palpitations. Negative for leg swelling.   Gastrointestinal: Negative for abdominal pain, blood in stool, constipation, diarrhea and nausea.   Endocrine: Negative for heat intolerance.   Genitourinary: Negative for dysuria and frequency.   Musculoskeletal: Positive for arthralgias (Pain in the left hip). Negative for back pain and joint swelling.   Allergic/Immunologic: Negative for food allergies.   Neurological: Negative for dizziness, seizures and headaches.   Hematological: Negative for adenopathy. Does not bruise/bleed easily.   Psychiatric/Behavioral: Negative for agitation and confusion.              Vitals:    09/10/18 0732 09/10/18 0750 09/10/18 1011 09/10/18 1300   BP:  120/80 117/67 122/81   BP Location:  Left arm Left arm Left arm   Patient Position:  Lying Lying Lying   Pulse: 93 87 84 70   Resp: 20 20  16   Temp:  98.2 °F (36.8 °C)  98.5 °F (36.9 °C)   TempSrc:    Oral   SpO2:  94% 95% 97%   Weight:       Height:         ECOG 2    PHYSICAL  EXAM:      CONSTITUTIONAL:  Well-developed fairly-nourished male.  No distress, looks comfortable.  EYES:  Conjunctiva and lids unremarkable.  PERRLA  EARS,NOSE,MOUTH,THROAT:  Ears and nose appear unremarkable.  Oral mucosa moist.  Lips appear unremarkable.  RESPIRATORY:  Normal respiratory effort.  Lungs clear to auscultation bilaterally.  CARDIOVASCULAR:  Regular rhythm and a rate.   Normal S1, S2.  No murmurs.   GASTROINTESTINAL: Abdomen appears unremarkable.  Nontender.  No hepatomegaly.  No splenomegaly.  Bowel sounds normal.   LYMPHATIC:  No cervical, supraclavicular lymphadenopathy.  MUSCULOSKELETAL:  Unremarkable digits/nails.  No cyanosis or clubbing.  No significant lower extremity edema.  SKIN:  Warm.  No rashes.  PSYCHIATRIC:  Normal judgment and insight.  Normal mood and affect.      RECENT LABS:        WBC   Date Value Ref Range Status   09/10/2018 11.29 (H) 4.50 - 10.70 10*3/mm3 Final   09/08/2018 11.33 (H) 4.50 - 10.70 10*3/mm3 Final   09/07/2018 13.41 (H) 4.50 - 10.70 10*3/mm3 Final     Hemoglobin   Date Value Ref Range Status   09/10/2018 12.4 (L) 13.7 - 17.6 g/dL Final   09/08/2018 13.1 (L) 13.7 - 17.6 g/dL Final   09/07/2018 14.2 13.7 - 17.6 g/dL Final     Platelets   Date Value Ref Range Status   09/10/2018 374 140 - 500 10*3/mm3 Final   09/08/2018 369 140 - 500 10*3/mm3 Final   09/07/2018 412 140 - 500 10*3/mm3 Final     Lab Results   Component Value Date    NEUTROABS 9.29 (H) 09/10/2018       Results from last 7 days  Lab Units 09/10/18  0542 09/09/18  1948 09/08/18  0529 09/07/18  2126   SODIUM mmol/L 133* 132* 135* 134*   POTASSIUM mmol/L 3.7 3.9 4.1 4.0   CHLORIDE mmol/L 93* 93* 92* 91*   CO2 mmol/L 27.6 26.6 27.2 26.1   BUN mg/dL 23 27* 31* 27*   CREATININE mg/dL 0.88 0.91 0.88 0.88   CALCIUM mg/dL 10.2 9.7 10.1 10.2   BILIRUBIN mg/dL  --   --   --  0.7   ALK PHOS U/L  --   --   --  112   ALT (SGPT) U/L  --   --   --  8   AST (SGOT) U/L  --   --   --  13   GLUCOSE mg/dL 122* 134* 106* 121*        IMAGE STUDIES:   1. NUCLEAR MEDICINE WHOLE BODY BONE SCAN 9/10/2018     HISTORY: Fall 1 week ago injuring left rib cage. Hip pain. Suspected  lung cancer with hepatic and bony metastatic disease.     TECHNIQUE:  21.3 mCi of 99m technetium MDP was administered IV followed  by 3 hour delayed phase whole body imaging with selected spot views.     COMPARISON: CT chest 08/31/2018, CT abdomen and pelvis 08/30/2018,   bone survey 09/09/2018.     FINDINGS:  There is abnormal osseous uptake within several ribs, some of  which corresponds with bone lesions demonstrated with CT. Mild increased  uptake is present left anterior 4th rib where there is a lytic lesion on  CT. There is increased uptake right anterior 6th rib where there is a  lytic lesion. No significant increased uptake is present in the left  lateral 10th rib where CT demonstrated fracture and overlying soft  tissue thickening and suspected mass.     There is increased uptake within the right subtrochanteric region within  the left proximal femur at the level of the lesser trochanter consistent  with metastases. Abnormal uptake is present within the region of the  left acromion where there is a lytic lesion demonstrated with CT. There  is also mild increased uptake overlying the right distal femoral  metaphysis medially consistent with metastasis. There is also faint  uptake overlying the region of the left maxilla suspicious for a left  maxillary lesion. There is mild increased uptake within the right  manubrium consistent with metastasis. Both kidneys and the urinary  bladder are visualized.     IMPRESSION;  Multifocal predominantly lytic, lytic osseous metastatic disease.    2. X-RAY BONE SURVEY COMPLETE.  9/9/2018      HISTORY: Multiple areas of bone pain suspicious lung mass.     COMPARISON: 3/30/2018.     FINDINGS:     The skull demonstrates salt-and-pepper appearance and further evaluation  is recommended. There is a 1.1 cm lucency at the inferior  aspect of the  right femoral neck. Another faint lucency seen in the lateral aspect of  the left femur at the proximal diametaphyseal junction. Fracture of the  distal left 11th rib with a lucency may represent a pathological  fracture.     Spondylosis of the spine, no definite lytic lesions.     Soft tissue mass in the right lung base is redemonstrated.     Soft tissue structures are unremarkable.     IMPRESSION:  Abnormal lucencies in the skull, distal left 11th rib and proximal  femora bilaterally. Metastatic disease is suspected, given the history.   Further evaluation with bone scan is recommended.      3. CT CHEST WITH CONTRAST- 8/31/2018   CLINICAL: Evaluate for metastatic disease.     TECHNIQUE: Radiation dose reduction techniques were utilized, including  automated exposure control and exposure modulation based on body size.     FINDINGS: Located along the pleural surface of the right lower lobe  there is a necrotic 1.6 x 2.1 cm nodule. There are necrotic lymph nodes  demonstrated in the right hilum, the largest of these 2.2 cm. There are  necrotic mediastinal lymph nodes, the largest is in the right  paratracheal space measuring 2.2 cm. There is partial collapse of the  right middle lobe. There are several tiny noncalcified pulmonary nodules  noted.     The heart size is normal. No pericardial abnormality. The esophagus is  satisfactory in course and caliber. Limited images through the upper  abdomen demonstrate ill-defined hypodense hepatic nodules, both adrenal  glands are thickened however no focal adrenal mass. There is a small  nonobstructing right renal calculus.     Fracture of the left 10th rib. Subtle lytic lesion within the left 4th  rib.     CONCLUSION: There are several noncalcified pulmonary nodules, the  largest within the right lower lobe. The largest is necrotic and there  is necrotic right hilar and mediastinal lymphadenopathy. Fracture,  probably pathologic left 10th rib, lytic lesion  left 4th rib.      4. CT SCANS ABDOMEN AND PELVIS WITH IV CONTRAST.  8/30/2018     HISTORY: LLQ bruising     COMPARISON: None.     TECHNIQUE: Radiation dose reduction techniques were utilized, including  automated exposure control and exposure modulation based on body size.  Axial images were obtained from the lung bases to the symphysis pubis  with IV contrast only.. Oral contrast was not administered per request.     FINDINGS ABDOMEN CT:  There are basilar pulmonary nodules including a  dominant 2 cm spiculated nodule anteriorly in the right lower lobe.  There is a 3 cm ovoid low density mass in the right infrahilar region  which is only partially imaged but likely reflects adenopathy. Some  consolidation of the lateral segment of the right middle lobe may be  related to postobstructive atelectasis. There are also multiple low  density liver lesions, largest in the left hepatic lobe measures 1.5 cm  and averages 50 Hounsfield units (solid). The other lesions are smaller  but visually appears similar in density and are suspicious for  metastatic foci. There are numerous low-density renal lesions, several  do not appear to reflect cysts. There is bilateral adrenal enlargement  with a 21 mm low density mass in the right adrenal gland suspicious for  metastatic focus. Remaining solid organs have an unremarkable  appearance.     FINDINGS PELVIS CT:  Normal appendix. The GI tract not opacified for  assessment but non obstructive in appearance. No ascites. Encompassed  aorta is non-aneurysmal. 9 mm low-density lesion in the left femoral  head anteriorly, image 127, likely small subchondral cyst. On image 46,  there is a subtle minimally displaced fracture of the left 10th rib with  some thickening of the adjacent 9/10 intercostal musculature which could  be related to hematoma. Please correlate clinically. Regional osseous  structures otherwise unremarkable on osseous windows.              IMPRESSION:  IMPRESSION :   1.  Findings most consistent with metastatic neoplastic process. A  pulmonary primary would be a strong diagnostic consideration given the  pattern.  2. Low-density renal lesions, several do not reflect simple cysts.  3. Subtle nondisplaced left 10th rib fracture with adjacent muscular  thickening         Assessment/Plan     1.  Patient is a 63-year-old male who has long history of cigarette smoking for 40 years and at least 1 pack a day. Patient has no previous significant medical problem until recently in late 08/2018. He was admitted for left-sided chest pain/rib pain and was found having evidence of lung cancer with metastatic disease involving the bones and liver. His CT-guided liver biopsy on 08/31/2018 was nondiagnostic. No evidence of malignancy. He did have CT-guided right lung mass biopsy early this afternoon. I reviewed all his CT scan images as well as his bone survey and his bone scan today. I think this patient will need a brain MRI examination with and without IV contrast to complete his evaluation. He is highly suspicious for metastatic brain disease also. He does not have symptom for nausea, vomiting, headache, vision changes.     I discussed with him depending on the pathology results, the lung biopsy sample may need to be further tested for genetic studies for gene mutations and gene translocations as well as PD-L1 to see if he is a candidate for targeted therapy or immunotherapy. Certainly if he has small cell lung cancer then he will need relatively quick systematic chemotherapy.    2. He also has anemia probably likely related to his metastatic lung cancer. Nevertheless, I will check his B12, folic acid and iron study for assessment.     3. Pain secondary to metastatic bone disease. Patient is on as needed Norco for pain control.    4.  Metastatic bone disease associated with pain.  Patient is on narcotics.      I recommended to give him one dose Zometa today which hopefully will help with his pain  and the protect his bones from pathologic fracture.      PLAN:   1.  Brain MRI examination within and without contrast.   2.  Zometa 1 dose today.  This will be repeated in every 4 weeks.   3.  Laboratory study in the morning for ferritin, iron profile, B12 and folic acid.      I independently reviewed all his CT scan images, bone survey images and bone scans images.  I agree with the assessment.      Saw patient and discussed with him as documented above.    I discussed the case with Dr. Hou and Dr. Rossi today.    I also spoke with his nurse today.     Thanks for letting us participating in the care of this patient!      JOB HWANG M.D., Ph.D.    9/10/2018        Dictated using Dragon Dictation.

## 2018-09-10 NOTE — PLAN OF CARE
Problem: Patient Care Overview  Goal: Plan of Care Review  Outcome: Ongoing (interventions implemented as appropriate)   09/09/18 2034   Coping/Psychosocial   Plan of Care Reviewed With patient   Plan of Care Review   Progress improving   OTHER   Outcome Summary on room air/taking mininebs/improving

## 2018-09-10 NOTE — PROGRESS NOTES
"  PROGRESS NOTE  Patient Name: Beltran Leo  Age/Sex: 63 y.o. male  : 1955  MRN: [id number]    Date of Admission: 2018  Date of Encounter Visit: 09/10/18   LOS: 3 days   Patient Care Team:  Provider, No Known as PCP - Eliot Wray MD as Consulting Physician (Hematology and Oncology)    Chief Complaint:Chest pain    Hospital course: admitted with chest pain, had lung nodules with suspected metastatic disease, initial attempt to biopsy liver lesion was non diagnostic    Interval History: had lung biopsy today, was unable to do the PFT  No changes in his respiratory complaints  No GI or  issues    REVIEW OF SYSTEMS:   CONSTITUTIONAL: no fever or chills  CARDIOVASCULAR: chest pain.   RESPIRATORY: No shortness of breath, minimal cough.   GASTROINTESTINAL: No anorexia, nausea, vomiting or diarrhea. No abdominal pain or blood.   HEMATOLOGIC: No bleeding or bruising.   fatigue    Ventilator/Non-Invasive Ventilation Settings     Nasal O2            Vital Signs  Temp:  [98 °F (36.7 °C)-98.6 °F (37 °C)] 98.5 °F (36.9 °C)  Heart Rate:  [] 70  Resp:  [16-20] 16  BP: (117-131)/(67-93) 122/81  SpO2:  [94 %-97 %] 97 %  on    Device (Oxygen Therapy): room air  No intake or output data in the 24 hours ending 09/10/18 1646  Flowsheet Rows      First Filed Value   Admission Height  167.6 cm (66\") Documented at 2018 205   Admission Weight  70.9 kg (156 lb 3.2 oz) Documented at 2018 0100        Body mass index is 23.24 kg/m².  1    18  0100 18  0101   Weight: 70.9 kg (156 lb 3.2 oz) 65.3 kg (143 lb 15.4 oz)       Physical Exam:  GEN:  No acute distress, alert, cooperative, well developed , depressed, on nasal O2  EYES:   Sclera clear. No icterus. PERRL. Normal EOM  ENT:   External ears/nose normal, no oral lesions, no thrush, mucous membranes moist  NECK:  Supple, midline trachea, no JVD  LUNGS: Normal chest on inspection, CTAB, no wheezes. No rhonchi. No crackles. " Respirations regular, even and unlabored.   CV:  Regular rhythm and rate. Normal S1/S2. No murmurs, gallops, or rubs noted.  ABD:  Soft, non-tender and non-distended. Normal bowel sounds. No guarding  EXT:  Moves all extremities well. No cyanosis. No redness. No edema.   Skin: dry, intact, no bleeding    Results Review:        Results from last 7 days  Lab Units 09/10/18  0542 09/09/18 1948 09/08/18 0529 09/07/18 2126   SODIUM mmol/L 133* 132* 135* 134*   POTASSIUM mmol/L 3.7 3.9 4.1 4.0   CHLORIDE mmol/L 93* 93* 92* 91*   CO2 mmol/L 27.6 26.6 27.2 26.1   BUN mg/dL 23 27* 31* 27*   CREATININE mg/dL 0.88 0.91 0.88 0.88   CALCIUM mg/dL 10.2 9.7 10.1 10.2   AST (SGOT) U/L  --   --   --  13   ALT (SGPT) U/L  --   --   --  8   ANION GAP mmol/L 12.4 12.4 15.8 16.9   ALBUMIN g/dL  --   --   --  4.20       Results from last 7 days  Lab Units 09/07/18 2126   TROPONIN T ng/mL <0.010       Results from last 7 days  Lab Units 09/07/18 2126   TSH mIU/mL 3.130       Results from last 7 days  Lab Units 09/07/18 2126   PROBNP pg/mL 628.6       Results from last 7 days  Lab Units 09/10/18  0542 09/08/18 0529 09/07/18 2126   WBC 10*3/mm3 11.29* 11.33* 13.41*   HEMOGLOBIN g/dL 12.4* 13.1* 14.2   HEMATOCRIT % 38.3* 40.4 42.8   PLATELETS 10*3/mm3 374 369 412   MCV fL 92.3 93.3 93.2   NEUTROPHIL % % 82.3*  --  81.1*   LYMPHOCYTE % % 9.9*  --  12.5*   MONOCYTES % % 7.5  --  6.0   EOSINOPHIL % % 0.1*  --  0.1*   BASOPHIL % % 0.0  --  0.1   IMM GRAN % % 0.2  --  0.2       Results from last 7 days  Lab Units 09/07/18 2126   INR  1.10   APTT seconds 33.5       Results from last 7 days  Lab Units 09/07/18 2126   MAGNESIUM mg/dL 2.1           Invalid input(s): LDLCALC          No results found for: POCGLU                        Imaging:   Imaging Results (all)     Procedure Component Value Units Date/Time    NM Bone Scan Whole Body [[id number]] Collected:  09/10/18 1212     Updated:  09/10/18 1433    Narrative:       NUCLEAR MEDICINE  WHOLE BODY BONE SCAN     HISTORY: Fall 1 week ago injuring left rib cage. Hip pain. Suspected  lung cancer with hepatic and bony metastatic disease.     TECHNIQUE:  21.3 mCi of 99m technetium MDP was administered IV followed  by 3 hour delayed phase whole body imaging with selected spot views.     COMPARISON: CT chest 08/31/2018, CT abdomen and pelvis 08/30/2018,   bone survey 09/09/2018.     FINDINGS:  There is abnormal osseous uptake within several ribs, some of  which corresponds with bone lesions demonstrated with CT. Mild increased  uptake is present left anterior 4th rib where there is a lytic lesion on  CT. There is increased uptake right anterior 6th rib where there is a  lytic lesion. No significant increased uptake is present in the left  lateral 10th rib where CT demonstrated fracture and overlying soft  tissue thickening and suspected mass.     There is increased uptake within the right subtrochanteric region within  the left proximal femur at the level of the lesser trochanter consistent  with metastases. Abnormal uptake is present within the region of the  left acromion where there is a lytic lesion demonstrated with CT. There  is also mild increased uptake overlying the right distal femoral  metaphysis medially consistent with metastasis. There is also faint  uptake overlying the region of the left maxilla suspicious for a left  maxillary lesion. There is mild increased uptake within the right  manubrium consistent with metastasis. Both kidneys and the urinary  bladder are visualized.     IMPRESSION;  Multifocal predominantly lytic, lytic osseous metastatic disease.       CT Needle Biopsy Lung [[id number]] Updated:  09/10/18 1356    XR Bone Survey Complete [[id number]] Collected:  09/09/18 1936     Updated:  09/10/18 0426    Narrative:       X-RAY BONE SURVEY COMPLETE.     HISTORY: Multiple areas of bone pain suspicious lung mass.     COMPARISON: 3/30/2018.     FINDINGS:     The skull demonstrates  salt-and-pepper appearance and further evaluation  is recommended. There is a 1.1 cm lucency at the inferior aspect of the  right femoral neck. Another faint lucency seen in the lateral aspect of  the left femur at the proximal diametaphyseal junction. Fracture of the  distal left 11th rib with a lucency may represent a pathological  fracture.     Spondylosis of the spine, no definite lytic lesions.     Soft tissue mass in the right lung base is redemonstrated.     Soft tissue structures are unremarkable.       Impression:       Abnormal lucencies in the skull, distal left 11th rib and proximal  femora bilaterally. Metastatic disease is suspected, given the history.   Further evaluation with bone scan is recommended.     This report was finalized on 9/10/2018 4:22 AM by Dr. Flash Kauffman M.D.       XR Chest 1 View [[id number]] Collected:  09/07/18 2137     Updated:  09/08/18 1922    Narrative:       CHEST PA AND LATERAL.     HISTORY: Tachycardia.     COMPARISON: 8/20/2018.     FINDINGS:  Cardiomediastinal silhouette is within normal limits.         There is no consolidation or effusion. 2.5 cm soft tissue nodule at the  right lung base is unchanged. A few smaller densities are not as well  visualized.          Impression:       No acute findings. Nodular opacities of the right lung base measuring up  to 2.5 cm, no significant change, neoplastic process is suspected.     This report was finalized on 9/8/2018 7:19 PM by Dr. Flash Kauffman M.D.               I reviewed the patient's new clinical results.  I personally viewed and interpreted the patient's imaging results:        Medication Review:     diltiaZEM  mg Oral Q24H   ipratropium-albuterol 3 mL Nebulization Q4H - RT   pantoprazole 40 mg Oral Q AM            ASSESSMENT:   Multiple pulmonary nodules with mediastinal lymphadenopathy and distant lesion suspicious for lung cancer with metastasis  Suspected COPD  Rib fracture and torn muscle from trying to  lift a heavy object  New onset atrial fibrillation back in sinus rhythm    PLAN:  Follow on the CT lung biopsy  PFT in am  Bone scan positive for metastasis  Oncology to follow and decide on treatments depending on biopsy results    Discussed with patient and staff    Disposition:     Padma Rossi MD  09/10/18  4:46 PM            Dictated utilizing Dragon dictation

## 2018-09-10 NOTE — INTERVAL H&P NOTE
H&P reviewed. The patient was examined and there are no changes to the H&P.   Risks discussed included but not limited to pain, bleeding, infection, damaging surrounding structures, pneumothorax requiring a chest tube and need for further procedures.

## 2018-09-10 NOTE — PLAN OF CARE
Problem: Patient Care Overview  Goal: Plan of Care Review   09/10/18 0305   Coping/Psychosocial   Plan of Care Reviewed With patient   Plan of Care Review   Progress no change   OTHER   Outcome Summary Pt complains of constant pain and prn pain meds given. Bone scan done and biopsy of lung pending. Pt continues to flip back and forth from SR to Afib with rate anywhere from the 80's-160's. VSS. Continue to monitor. Safety maintained.        Problem: Arrhythmia/Dysrhythmia (Symptomatic) (Adult)  Goal: Signs and Symptoms of Listed Potential Problems Will be Absent, Minimized or Managed (Arrhythmia/Dysrhythmia)  Outcome: Ongoing (interventions implemented as appropriate)      Problem: Pain, Acute (Adult)  Goal: Acceptable Pain Control/Comfort Level  Outcome: Ongoing (interventions implemented as appropriate)

## 2018-09-10 NOTE — PLAN OF CARE
Problem: Patient Care Overview  Goal: Plan of Care Review  Outcome: Ongoing (interventions implemented as appropriate)   09/10/18 9077   Coping/Psychosocial   Plan of Care Reviewed With patient   Plan of Care Review   Progress no change   OTHER   Outcome Summary BONE SCAN CONFIRMED METS TO BONE. ONC CONSULTED. pft ORDERED WILL DO TOMMORROW. Lung biopsy done.

## 2018-09-10 NOTE — NURSING NOTE
Pain pills given prior to coming down have kicked in.  Patient extremely sleepy.  Dr. Cerrato wants to wait until patient is more alert and able to follow commands.Patient does wake up when spoken to, drifts back to sleep while talking.  Will reassess in 45 minuties

## 2018-09-11 PROBLEM — C79.31 SECONDARY CANCER OF BRAIN (HCC): Status: ACTIVE | Noted: 2018-01-01

## 2018-09-11 PROBLEM — D63.8 ANEMIA OF CHRONIC DISEASE: Status: ACTIVE | Noted: 2018-01-01

## 2018-09-11 NOTE — PLAN OF CARE
Problem: Patient Care Overview  Goal: Plan of Care Review  Outcome: Ongoing (interventions implemented as appropriate)   09/11/18 7172   Coping/Psychosocial   Plan of Care Reviewed With patient;sibling   Plan of Care Review   Progress no change   OTHER   Outcome Summary pt started on steroid for brain swelling. radiation onc saw today. will cotn to monitor. still not eating or drink. fluids continued. pt still refusing any activty or even sitting on side of bed.

## 2018-09-11 NOTE — PROGRESS NOTES
"DAILY PROGRESS NOTE  [facility]    Patient Identification:  Name: Beltran Leo  Age: 63 y.o.  Sex: male  :  1955  MRN: [id number]         Primary Care Physician: Provider, No Known    Subjective:  Interval History:He complains of pain. Hurting all over.    Objective:    Scheduled Meds:  dexamethasone 4 mg Oral Q6H   diltiaZEM  mg Oral Q24H   famotidine 20 mg Oral BID   insulin aspart 0-9 Units Subcutaneous 4x Daily With Meals & Nightly   ipratropium-albuterol 3 mL Nebulization Q4H - RT   levETIRAcetam 500 mg Oral Q12H   pantoprazole 40 mg Oral Q AM   vitamin B-12 1,000 mcg Oral Daily     Continuous Infusions:  sodium chloride 100 mL/hr Last Rate: 100 mL/hr (09/10/18 2315)       Vital signs in last 24 hours:  Temp:  [98 °F (36.7 °C)-99 °F (37.2 °C)] 99 °F (37.2 °C)  Heart Rate:  [78-96] 89  Resp:  [16-20] 20  BP: (121-132)/(70-82) 121/79    Intake/Output:    Intake/Output Summary (Last 24 hours) at 18 1604  Last data filed at 18 0900   Gross per 24 hour   Intake             1115 ml   Output                0 ml   Net             1115 ml       Exam:  /79 (BP Location: Left arm, Patient Position: Lying)   Pulse 89   Temp 99 °F (37.2 °C) (Oral)   Resp 20   Ht 167.6 cm (66\")   Wt 65.3 kg (143 lb 15.4 oz)   SpO2 95%   BMI 23.24 kg/m²     General Appearance:    Alert, cooperative, no distress   Head:    Normocephalic, without obvious abnormality, atraumatic   Eyes:       Throat:   Lips, tongue, gums normal   Neck:   Supple, symmetrical, trachea midline, no JVD   Lungs:     Clear to auscultation bilaterally, respirations unlabored   Chest Wall:    No tenderness or deformity    Heart:    Regular rate and rhythm, S1 and S2 normal, no murmur,no  Rub or gallop   Abdomen:     Soft, non-tender, bowel sounds active, no masses, no organomegaly    Extremities:   Extremities normal, atraumatic, no cyanosis or edema   Pulses:      Skin:   Skin is warm and dry,  no rashes or " "palpable lesions   Neurologic:   no focal deficits noted      Lab Results (last 72 hours)     Procedure Component Value Units Date/Time    Tissue Pathology Exam [[id number]] Collected:  09/10/18 1326    Specimen:  Tissue from Lung, Right Lower Lobe Updated:  09/11/18 1513     Case Report --     Surgical Pathology Report                         Case: [id number]                                  Authorizing Provider:  Padma Rossi MD         Collected:           09/10/2018 01:26 PM          Ordering Location:     [facility]  Received:            09/10/2018 02:52 PM                                 [address]                                                                      Pathologist:           Manny Le MD                                                                           Specimen:    Lung, Right Lower Lobe, Right lower lobe mass                                               Clinical Information --     Foundation CDX rather than lung reflex per Dr. Madison Lombardo.       Final Diagnosis --     RIGHT LOWER LOBE MASS BIOPSY:        BENIGN PULMONARY PARENCHYMA WITH FOCAL MILD CHRONIC INFLAMMATION, HEMORRHAGE.        BLOOD CLOT.        TUMOR AND GRANULOMA ARE NOT IDENTIFIED.            COMMENT: Multiple levels deep in the block have been examined microscopically.    K/  IHC/TDJ    CPT CODES:  1. 97081       Gross Description --     Received in formalin labeled \"CT guided biopsy, right lower lung mass\" are 2 tan to hemorrhagic needle core biopsies up to 1.1 cm long and less than 0.1 cm across.  The tissue is entirely submitted in a single block labeled 1A.   CC/USO/TDJ/brb       Microscopic Description --     Performed, incorporated in diagnosis.         Vitamin B12 [[id number]]  (Normal) Collected:  09/11/18 0446    Specimen:  Blood Updated:  09/11/18 0644     Vitamin B-12 357 pg/mL     Folate [[id number]]  " (Normal) Collected:  09/11/18 0446    Specimen:  Blood Updated:  09/11/18 0644     Folate 7.34 ng/mL     Ferritin [[id number]]  (Abnormal) Collected:  09/11/18 0446    Specimen:  Blood Updated:  09/11/18 0634     Ferritin 430.70 (H) ng/mL     Basic Metabolic Panel [[id number]]  (Abnormal) Collected:  09/11/18 0446    Specimen:  Blood Updated:  09/11/18 0624     Glucose 107 (H) mg/dL      BUN 22 mg/dL      Creatinine 0.70 (L) mg/dL      Sodium 133 (L) mmol/L      Potassium 4.1 mmol/L      Chloride 93 (L) mmol/L      CO2 26.6 mmol/L      Calcium 10.2 mg/dL      eGFR Non African Amer 114 mL/min/1.73      BUN/Creatinine Ratio 31.4 (H)     Anion Gap 13.4 mmol/L     Narrative:       GFR Normal >60  Chronic Kidney Disease <60  Kidney Failure <15    Iron Profile [[id number]]  (Abnormal) Collected:  09/11/18 0446    Specimen:  Blood Updated:  09/11/18 0624     Iron 53 (L) mcg/dL      Iron Saturation 20 %      Transferrin 177 (L) mg/dL      TIBC 264 mcg/dL     CBC & Differential [[id number]] Collected:  09/11/18 0446    Specimen:  Blood Updated:  09/11/18 0602    Narrative:       The following orders were created for panel order CBC & Differential.  Procedure                               Abnormality         Status                     ---------                               -----------         ------                     CBC Auto Differential[[id number]]        Abnormal            Final result                 Please view results for these tests on the individual orders.    CBC Auto Differential [[id number]]  (Abnormal) Collected:  09/11/18 0446    Specimen:  Blood Updated:  09/11/18 0602     WBC 11.74 (H) 10*3/mm3      RBC 4.31 (L) 10*6/mm3      Hemoglobin 13.0 (L) g/dL      Hematocrit 40.5 %      MCV 94.0 fL      MCH 30.2 pg      MCHC 32.1 (L) g/dL      RDW 11.8 %      RDW-SD 39.8 fl      MPV 11.2 fL      Platelets 401 10*3/mm3      Neutrophil % 82.2 (H) %      Lymphocyte % 10.1 (L) %      Monocyte % 7.1 %      Eosinophil % 0.2 (L)  %      Basophil % 0.1 %      Immature Grans % 0.3 %      Neutrophils, Absolute 9.66 (H) 10*3/mm3      Lymphocytes, Absolute 1.19 10*3/mm3      Monocytes, Absolute 0.83 10*3/mm3      Eosinophils, Absolute 0.02 10*3/mm3      Basophils, Absolute 0.01 10*3/mm3      Immature Grans, Absolute 0.03 10*3/mm3     Osmolality, Urine - Urine, Clean Catch [[id number]] Collected:  09/10/18 1944    Specimen:  Urine from Urine, Clean Catch Updated:  09/10/18 2101     Osmolality, Urine 783 mOsm/kg     Narrative:       Osmo Normal Reference Ranges:    Random:  mOsm/kg H2O, depending on fluid intake.  Random: >850 mOsm/kg H20, after 12 hour fluid restriction.    24 Hour: 300-900 mOsm/kg H2O.    Sodium, Urine, Random - Urine, Clean Catch [[id number]] Collected:  09/10/18 1944    Specimen:  Urine from Urine, Clean Catch Updated:  09/10/18 2044     Sodium, Urine 31 mmol/L     Narrative:       Reference intervals for random urine have not been established.  Clinical usage is dependent upon physician's interpretation in combination with other laboratory tests.     Basic Metabolic Panel [[id number]]  (Abnormal) Collected:  09/10/18 0542    Specimen:  Blood Updated:  09/10/18 0632     Glucose 122 (H) mg/dL      BUN 23 mg/dL      Creatinine 0.88 mg/dL      Sodium 133 (L) mmol/L      Potassium 3.7 mmol/L      Chloride 93 (L) mmol/L      CO2 27.6 mmol/L      Calcium 10.2 mg/dL      eGFR Non African Amer 87 mL/min/1.73      BUN/Creatinine Ratio 26.1 (H)     Anion Gap 12.4 mmol/L     Narrative:       GFR Normal >60  Chronic Kidney Disease <60  Kidney Failure <15    CBC & Differential [[id number]] Collected:  09/10/18 0542    Specimen:  Blood Updated:  09/10/18 0620    Narrative:       The following orders were created for panel order CBC & Differential.  Procedure                               Abnormality         Status                     ---------                               -----------         ------                     CBC Auto  Differential[[id number]]        Abnormal            Final result                 Please view results for these tests on the individual orders.    CBC Auto Differential [[id number]]  (Abnormal) Collected:  09/10/18 0542    Specimen:  Blood Updated:  09/10/18 0620     WBC 11.29 (H) 10*3/mm3      RBC 4.15 (L) 10*6/mm3      Hemoglobin 12.4 (L) g/dL      Hematocrit 38.3 (L) %      MCV 92.3 fL      MCH 29.9 pg      MCHC 32.4 (L) g/dL      RDW 11.6 %      RDW-SD 39.6 fl      MPV 10.9 fL      Platelets 374 10*3/mm3      Neutrophil % 82.3 (H) %      Lymphocyte % 9.9 (L) %      Monocyte % 7.5 %      Eosinophil % 0.1 (L) %      Basophil % 0.0 %      Immature Grans % 0.2 %      Neutrophils, Absolute 9.29 (H) 10*3/mm3      Lymphocytes, Absolute 1.12 10*3/mm3      Monocytes, Absolute 0.85 10*3/mm3      Eosinophils, Absolute 0.01 10*3/mm3      Basophils, Absolute 0.00 10*3/mm3      Immature Grans, Absolute 0.02 10*3/mm3     Basic Metabolic Panel [[id number]]  (Abnormal) Collected:  09/09/18 1948    Specimen:  Blood Updated:  09/09/18 2019     Glucose 134 (H) mg/dL      BUN 27 (H) mg/dL      Creatinine 0.91 mg/dL      Sodium 132 (L) mmol/L      Potassium 3.9 mmol/L      Chloride 93 (L) mmol/L      CO2 26.6 mmol/L      Calcium 9.7 mg/dL      eGFR Non African Amer 84 mL/min/1.73      BUN/Creatinine Ratio 29.7 (H)     Anion Gap 12.4 mmol/L     Narrative:       GFR Normal >60  Chronic Kidney Disease <60  Kidney Failure <15        Data Review:    Results from last 7 days  Lab Units 09/11/18  0446 09/10/18  0542 09/09/18 1948   SODIUM mmol/L 133* 133* 132*   POTASSIUM mmol/L 4.1 3.7 3.9   CHLORIDE mmol/L 93* 93* 93*   CO2 mmol/L 26.6 27.6 26.6   BUN mg/dL 22 23 27*   CREATININE mg/dL 0.70* 0.88 0.91   GLUCOSE mg/dL 107* 122* 134*   CALCIUM mg/dL 10.2 10.2 9.7       Results from last 7 days  Lab Units 09/11/18  0446 09/10/18  0542 09/08/18  0529   WBC 10*3/mm3 11.74* 11.29* 11.33*   HEMOGLOBIN g/dL 13.0* 12.4* 13.1*   HEMATOCRIT % 40.5  38.3* 40.4   PLATELETS 10*3/mm3 401 374 369       Results from last 7 days  Lab Units 09/07/18 2126   TSH mIU/mL 3.130   FREE T4 ng/dL 1.43           Lab Results  Lab Value Date/Time   TROPONINT <0.010 09/07/2018 2126   TROPONINT <0.010 08/30/2018 2052           Results from last 7 days  Lab Units 09/07/18 2126   ALK PHOS U/L 112   BILIRUBIN mg/dL 0.7   ALT (SGPT) U/L 8   AST (SGOT) U/L 13       Results from last 7 days  Lab Units 09/07/18 2126   TSH mIU/mL 3.130   FREE T4 ng/dL 1.43         No results found for: POCGLU    Results from last 7 days  Lab Units 09/07/18 2126   INR  1.10       Patient Active Problem List   Diagnosis Code   • Hematuria R31.9   • Closed fracture of one rib S22.39XA   • Lung mass R91.8   • Abnormal CT of the abdomen R93.5   • Left hip pain M25.552   • Typical atrial flutter (CMS/HCC) I48.3   • Left rib fracture S22.32XA   • Wheezes R06.2   • Hyponatremia E87.1   • Atrial flutter with rapid ventricular response (CMS/HCC) I48.92   • Metastasis to bone (CMS/HCC) C79.51   • Secondary cancer of brain (CMS/HCC) C79.31   • Anemia of chronic disease D63.8       Assessment:  Active Hospital Problems    Diagnosis Date Noted   • **Typical atrial flutter (CMS/HCC) [I48.3] 09/07/2018   • Secondary cancer of brain (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       Plan:  Continue current RX and rate control. XRT and outpatient follow up being set up. DC planning.    Leobardo Babin MD  9/11/2018  4:04 PM

## 2018-09-11 NOTE — PLAN OF CARE
Problem: Patient Care Overview  Goal: Plan of Care Review  Outcome: Ongoing (interventions implemented as appropriate)   09/11/18 0335   Coping/Psychosocial   Plan of Care Reviewed With patient   Plan of Care Review   Progress no change   OTHER   Outcome Summary MRI of brain done this shift. One dose of Zometa given. IVF started. Continues to c/o generalized pain, medicated per MAR. PFTs ordered for today. Continue to monitor.        Problem: Arrhythmia/Dysrhythmia (Symptomatic) (Adult)  Goal: Signs and Symptoms of Listed Potential Problems Will be Absent, Minimized or Managed (Arrhythmia/Dysrhythmia)  Outcome: Ongoing (interventions implemented as appropriate)      Problem: Pain, Acute (Adult)  Goal: Acceptable Pain Control/Comfort Level  Outcome: Ongoing (interventions implemented as appropriate)

## 2018-09-11 NOTE — PROGRESS NOTES
Discharge Planning Assessment  [facility]     Patient Name: Beltran Leo  MRN: [id number]  Today's Date: 9/11/2018    Admit Date: 9/7/2018          Discharge Needs Assessment     Row Name 09/11/18 0856       Living Environment    Lives With alone    Current Living Arrangements home/apartment/condo    Primary Care Provided by self    Provides Primary Care For no one    Family Caregiver if Needed sibling(s)    Family Caregiver Names brother Manolo  he has no children    Quality of Family Relationships unable to assess    Able to Return to Prior Arrangements yes       Resource/Environmental Concerns    Resource/Environmental Concerns none    Transportation Concerns car, none       Transition Planning    Patient/Family Anticipates Transition to home    Patient/Family Anticipated Services at Transition other (see comments)    Transportation Anticipated family or friend will provide;car, drives self       Discharge Needs Assessment    Readmission Within the Last 30 Days no previous admission in last 30 days    Concerns to be Addressed denies needs/concerns at this time;other (see comments)    Equipment Currently Used at Home none    Anticipated Changes Related to Illness none    Equipment Needed After Discharge none            Discharge Plan     Row Name 09/11/18 0857       Plan    Plan Home  needs a primary care physician  [employer] co pay card given    Plan Comments CCP spoke with patient at bedside.  CCP role explained and discharge planning discussed.  Face sheet verified.  His emergency contact is his brother Manolo, [phone].  He does not have a primary care physician. Agrees for CCP to find a physician taking new patients.  He lives in an apartment alone.   He uses no DME.  He is independent with ADL's.   He is employed full time.  He has no HH history.  He has not been to rehab.   Plan is home with no needs.  Pt has family to transport.  CCP gave pt co pay card for Eliquis for $10 co pay per month.  CCP to  follow for outpatient needs           Destination     No service coordination in this encounter.      Durable Medical Equipment     No service coordination in this encounter.      Dialysis/Infusion     No service coordination in this encounter.      Home Medical Care     No service coordination in this encounter.      Social Care     No service coordination in this encounter.                Demographic Summary    No documentation.           Functional Status     Row Name 09/11/18 0855       Functional Status, IADL    Medications independent    Meal Preparation independent    Housekeeping independent    Laundry independent    Shopping independent       Mental Status    General Appearance WDL WDL       Mental Status Summary    Recent Changes in Mental Status/Cognitive Functioning no changes       Employment/    Employment Status employed full time            Psychosocial    No documentation.           Abuse/Neglect    No documentation.           Legal    No documentation.           Substance Abuse    No documentation.           Patient Forms    No documentation.         Jessi Bender RN

## 2018-09-11 NOTE — PROGRESS NOTES
Hospital Follow Up    LOS:  LOS: 4 days   Patient Name: Beltran Leo  Age/Sex: 63 y.o. male  : 1955  MRN: [id number]    Day of Service: 18   Length of Stay: 4  Encounter Provider: Simone Cheema MD  Place of Service: [facility]  Patient Care Team:  Provider, No Known as PCP - Eliot Wray MD as Consulting Physician (Hematology and Oncology)    Subjective:     Chief Complaint: Paroxysmal atrial fibrillation    Interval History: Patient doing okay.  No further problems overnight.  The pressure heart rate has tolerated ejection fraction normal    Objective:     Objective:  Temp:  [98 °F (36.7 °C)-98.7 °F (37.1 °C)] 98 °F (36.7 °C)  Heart Rate:  [70-88] 88  Resp:  [16-20] 20  BP: (121-132)/(70-82) 121/70     Intake/Output Summary (Last 24 hours) at 18 1105  Last data filed at 09/10/18 2315   Gross per 24 hour   Intake              995 ml   Output                0 ml   Net              995 ml     Body mass index is 23.24 kg/m².  1    18  0100 18  0101   Weight: 70.9 kg (156 lb 3.2 oz) 65.3 kg (143 lb 15.4 oz)     Weight change:       Physical Exam:   General : Alert, cooperative, in no acute distress.  Neuro: alert,cooperative and oriented  Lungs: CTAB. Normal respiratory effort and rate.  CV:: Regular rate and rhythm, normal S1 and S2, no murmurs, gallops or rubs.  ABD: Soft, nontender, non-distended. positive bowel sounds  Extr: No edema or cyanosis, moves all extremities    Lab Review:     Results from last 7 days  Lab Units 18  0446 09/10/18  0542  18  2126   SODIUM mmol/L 133* 133*  < > 134*   POTASSIUM mmol/L 4.1 3.7  < > 4.0   CHLORIDE mmol/L 93* 93*  < > 91*   CO2 mmol/L 26.6 27.6  < > 26.1   BUN mg/dL 22 23  < > 27*   CREATININE mg/dL 0.70* 0.88  < > 0.88   GLUCOSE mg/dL 107* 122*  < > 121*   CALCIUM mg/dL 10.2 10.2  < > 10.2   AST (SGOT) U/L  --   --   --  13   ALT (SGPT) U/L  --   --   --  8   < > =  values in this interval not displayed.    Results from last 7 days  Lab Units 09/07/18 2126   TROPONIN T ng/mL <0.010       Results from last 7 days  Lab Units 09/11/18  0446 09/10/18  0542   WBC 10*3/mm3 11.74* 11.29*   HEMOGLOBIN g/dL 13.0* 12.4*   HEMATOCRIT % 40.5 38.3*   PLATELETS 10*3/mm3 401 374       Results from last 7 days  Lab Units 09/07/18 2126   INR  1.10   APTT seconds 33.5       Results from last 7 days  Lab Units 09/07/18  2126   MAGNESIUM mg/dL 2.1           Invalid input(s): LDLCALC    Results from last 7 days  Lab Units 09/07/18 2126   PROBNP pg/mL 628.6       Results from last 7 days  Lab Units 09/07/18 2126   TSH mIU/mL 3.130   EKG  Current Medications:   Scheduled Meds:  dexamethasone 4 mg Oral Q6H   diltiaZEM  mg Oral Q24H   famotidine 20 mg Oral BID   ipratropium-albuterol 3 mL Nebulization Q4H - RT   levETIRAcetam 500 mg Oral Q12H   pantoprazole 40 mg Oral Q AM   vitamin B-12 1,000 mcg Oral Daily     Continuous Infusions:  sodium chloride 100 mL/hr Last Rate: 100 mL/hr (09/10/18 2315)       Allergies:  No Known Allergies    Assessment:     Principal Problem:    Typical atrial flutter (CMS/HCC)  Active Problems:    Lung mass    Left rib fracture    Wheezes    Hyponatremia    Atrial flutter with rapid ventricular response (CMS/HCC)    Metastasis to bone (CMS/HCC)    Secondary cancer of brain (CMS/HCC)    Anemia of chronic disease        Plan:      1.  Paroxysmal atrial fibrillation he remains in sinus rhythm.  He also had some PVCs again sinus rhythm.  2.  Metastatic bone disease per oncology.  3.  Patient remains stable from a cardiovascular standpoint would continue same.  We'll see patient on an as-needed basis call if questions issues or concerns.    Simone Cheema MD  09/11/18  11:05 AM

## 2018-09-11 NOTE — PROGRESS NOTES
Subjective     .      REASON FOR CONSULTATION:     1.  Provide an opinion on any further workup or treatment of metastatic lung cancer, bones, possible liver.   2.  Diffuse metastatic bone disease, Zometa 1 dose was given on 9/10/2018.  This will be repeated in every 4 weeks.    3.  Liver biopsy on 8/31/2018 was nondiagnostic.  Lung biopsy on 9/10/2018 with pathology results pending.      INTERVAL HISTORY:   Patient was given first dose of Zometa in the evening of 9/10/2018 for his metastatic bone disease associated with pain.     Brain MRI in the night on 9/10/2018 reported diffuse metastatic disease, most significant in the right cerebellum and surrounding edema.                HISTORY OF PRESENT ILLNESS:  The patient is a 63 y.o. year old male who was admitted this time on 09/07/2018 because of sudden onset atrial flutter with palpitation. This patient was evaluated in the ER with heart rate in the 170s. He was given adenosine and metoprolol and then started on diltiazem drip. He was converted to normal sinus rhythm.      This patient was recently hospitalized in the end of 08/2018 because of left-sided rib pain. He was found having evidence of metastatic disease likely lung cancer with metastasis to the liver and bones.     He originally had CT scan examination on 08/30/2018 for the abnormal pelvis for evaluation of left hip pain/left flank and lower chest wall pain. That study reported multiple pulmonary nodules with dominant 2 cm spiculated nodule in the right lower lobe. There was also 3 cm mass in the right infrahilar region. There were multiple low-density liver lesions with largest one measuring 1.5 cm in the left hepatic lobe. There was also bilateral adrenal enlargement with 21 mm low-density mass in the right side suspicious for metastatic disease. CT examination on 08/31/2018 reported necrotic lymph nodes in the right hilum, largest 2.2 cm. The right lower lobe necrotic lesion measures 1.6 x 2.1 cm and  right paratracheal space mediastinal lymph nodes measuring 2.2 cm. There was also evidence of metastatic bone disease in the left 4th rib and left 10th rib fracture.      He had CT-guided biopsy of the liver lesion on 08/31/2018. He was seen by Dr. Arriaga at that time for evaluation.  However, the liver biopsy turned out to be nondiagnostic with no evidence of malignancy. So we arranged patient to have outpatient lung biopsy which was scheduled on 09/14/2018.       Nevertheless, patient was admitted to hospital again.  During this hospitalization, patient had bone survey yesterday on 09/09/2018, which reported abnormal lucencies in the skull, distal left 11th rib and proximal femoral bilaterally. Patient had bone scan this afternoon, which reported evidence of metastatic disease in the left proximal femur, left acromion with lytic lesion. There was also right distal femoral metaphysis lesion and faint uptake of the left maxilla lesion. The right manubrium is a lesion suspicious.      Patient had CT-guided right lower lobe pulmonary nodule biopsy this afternoon. Pathology results are pending.      Patient reports no headaches, no vision changes, no nausea, no vomiting. He has poor appetite. Patient reports he lost about 30 pounds in the past 4 months. He has poor appetite actually longer than that period of time. He continues to have pain in the left rib cage and also on lower extremity mostly involving the left hip area. The pain severity is 6 out of 10 currently.      Laboratory study reported patient is mildly anemic with hemoglobin 12.4, MCV 92.3, MCHC 32.4, platelets 374,000 and WBC 11,300 including neutrophils 9300, lymphocytes 1100 and monocytes 850. Chemistry lab reported creatinine 0.88. Normal electrolytes except sodium 133, glucose 122. Liver panel on 09/07/2018 was normal. He also had normal TSH and free T4. Normal PT-INR, PTT. His urine was positive for opiates on 09/07/2018.           Medical History    Past Medical History:   Diagnosis Date   • Abnormal CT of the abdomen     • H/O Liver lesions     • H/O Lung nodule           Surgical History         Past Surgical History:   Procedure Laterality Date   • LIVER BIOPSY   08/31/2018     CT guided            HEMATOLOGIC/ONCOLOGIC HISTORY:  (History from previous dates can be found in the separate document.)     MEDICATIONS     Current Facility-Administered Medications:   •  acetaminophen (TYLENOL) tablet 650 mg, 650 mg, Oral, Q4H PRN, Jayla Richard E, APRN  •  acetaminophen (TYLENOL) tablet 650 mg, 650 mg, Oral, Q6H PRN, Jayla Richard, APRN  •  albuterol (PROVENTIL) nebulizer solution 0.083% 2.5 mg/3mL, 2.5 mg, Nebulization, Once PRN, Padma Rossi MD  •  calcium carbonate (TUMS) chewable tablet 500 mg (200 mg elemental), 2 tablet, Oral, TID PRN, Shaggy Mary MD, 2 tablet at 09/10/18 0821  •  diltiaZEM CD (CARDIZEM CD) 24 hr capsule 240 mg, 240 mg, Oral, Q24H, Jerica Hou MD, 240 mg at 09/10/18 0820  •  HYDROcodone-acetaminophen (NORCO)  MG per tablet 1 tablet, 1 tablet, Oral, Q4H PRN, 1 tablet at 09/09/18 2046 **OR** HYDROcodone-acetaminophen (NORCO)  MG per tablet 2 tablet, 2 tablet, Oral, Q4H PRN, Jerica Hou MD, 2 tablet at 09/10/18 1541  •  ipratropium-albuterol (DUO-NEB) nebulizer solution 3 mL, 3 mL, Nebulization, Q4H - RT, Jerica Hou MD, 3 mL at 09/10/18 0726  •  nitroglycerin (NITROSTAT) SL tablet 0.4 mg, 0.4 mg, Sublingual, Q5 Min PRN, Jayla Richard, APRN  •  ondansetron (ZOFRAN) tablet 4 mg, 4 mg, Oral, Q6H PRN, 4 mg at 09/09/18 1648 **OR** ondansetron ODT (ZOFRAN-ODT) disintegrating tablet 4 mg, 4 mg, Oral, Q6H PRN **OR** ondansetron (ZOFRAN) injection 4 mg, 4 mg, Intravenous, Q6H PRN, Jayla Richard APRN  •  ondansetron (ZOFRAN) injection 4 mg, 4 mg, Intravenous, Q6H PRN, Jayla Richard APRN  •  pantoprazole (PROTONIX) EC tablet 40 mg, 40 mg, Oral, Q AM, Jerica Hou MD, 40 mg at 09/10/18  1540  •  sodium chloride 0.9 % flush 1-10 mL, 1-10 mL, Intravenous, PRN, Jayla Richard, APRN  •  Insert peripheral IV, , , Once **AND** sodium chloride 0.9 % flush 10 mL, 10 mL, Intravenous, PRN, Alo Caldwell MD     ALLERGIES:   No Known Allergies     SOCIAL HISTORY:       Social History            Social History   • Marital status: Single       Spouse name: N/A   • Number of children: None.    • Years of education: N/A      Occupational History   •   [employer]           Social History Main Topics   • Smoking status: Current Every Day Smoker, at least 1 pack per day, has been smoking for 40 years.     • Smokeless tobacco: Not on file   • Alcohol use No   • Drug use: No   • Sexual activity: Not on file          FAMILY HISTORY:        Family History   Problem Relation Age of Onset   • Heart attack Mother     • Heart attack Father     Patient reports no family history for malignancy.      REVIEW OF SYSTEMS:  Review of Systems   Constitutional: Positive for appetite change, fatigue and unexpected weight change (About a 30 pounds in the past 4 months). Negative for chills, diaphoresis and fever.   HENT: Negative for congestion, hearing loss, nosebleeds, sinus pressure and voice change.    Eyes: Negative for visual disturbance.   Respiratory: Positive for cough (No hemoptysis). Negative for shortness of breath.    Cardiovascular: Positive for chest pain (Left chest wall) and palpitations. Negative for leg swelling.   Gastrointestinal: Negative for abdominal pain, blood in stool, constipation, diarrhea and nausea.   Endocrine: Negative for heat intolerance.   Genitourinary: Negative for dysuria and frequency.   Musculoskeletal: Positive for arthralgias (Pain in the left hip). Negative for back pain and joint swelling.   Allergic/Immunologic: Negative for food allergies.   Neurological: Negative for dizziness, seizures and headaches.   Hematological: Negative for adenopathy. Does not  bruise/bleed easily.   Psychiatric/Behavioral: Negative for agitation and confusion.               Objective   Vitals:    09/10/18 1300 09/10/18 2000 09/10/18 2333 09/11/18 0759   BP: 122/81 132/82 121/78 121/70   BP Location: Left arm Right arm Left arm Left arm   Patient Position: Lying Lying Lying Lying   Pulse: 70 80 78 88   Resp: 16 20 16 20   Temp: 98.5 °F (36.9 °C) 98.5 °F (36.9 °C) 98.7 °F (37.1 °C) 98 °F (36.7 °C)   TempSrc: Oral Oral Oral Oral   SpO2: 97% 95% 98% 96%   Weight:       Height:       ECOG 2     PHYSICAL EXAM:       CONSTITUTIONAL:  Well-developed fairly-nourished male.  No distress, looks comfortable.  EYES:  Conjunctiva and lids unremarkable.   EARS,NOSE,MOUTH,THROAT:  Ears and nose appear unremarkable.  Oral mucosa moist.  Lips appear unremarkable.  RESPIRATORY:  Normal respiratory effort.  Lungs clear to auscultation bilaterally.  CARDIOVASCULAR:  Regular rhythm and rate.   Normal S1, S2.  No murmurs.   GASTROINTESTINAL: Abdomen appears unremarkable.  Nontender.  No hepatomegaly.  No splenomegaly.  Bowel sounds normal.   LYMPHATIC:  No cervical, supraclavicular lymphadenopathy.  MUSCULOSKELETAL:  Unremarkable digits/nails.  No cyanosis or clubbing.  No significant lower extremity edema.  SKIN:  Warm.  No rashes.  PSYCHIATRIC:  Normal judgment and insight.  Normal mood and affect.        RECENT LABS:    Results from last 7 days  Lab Units 09/11/18  0446 09/10/18  0542 09/08/18  0529   WBC 10*3/mm3 11.74* 11.29* 11.33*   HEMOGLOBIN g/dL 13.0* 12.4* 13.1*   HEMATOCRIT % 40.5 38.3* 40.4   PLATELETS 10*3/mm3 401 374 369     Results from last 7 days  Lab Units 09/11/18  0446 09/10/18  0542 09/09/18 1948 09/07/18 2126   SODIUM mmol/L 133* 133* 132*  < > 134*   POTASSIUM mmol/L 4.1 3.7 3.9  < > 4.0   CHLORIDE mmol/L 93* 93* 93*  < > 91*   CO2 mmol/L 26.6 27.6 26.6  < > 26.1   BUN mg/dL 22 23 27*  < > 27*   CREATININE mg/dL 0.70* 0.88 0.91  < > 0.88   CALCIUM mg/dL 10.2 10.2 9.7  < > 10.2    BILIRUBIN mg/dL  --   --   --   --  0.7   ALK PHOS U/L  --   --   --   --  112   ALT (SGPT) U/L  --   --   --   --  8   AST (SGOT) U/L  --   --   --   --  13   GLUCOSE mg/dL 107* 122* 134*  < > 121*   < > = values in this interval not displayed.     Results from last 7 days  Lab Units 09/07/18  2126   INR  1.10   APTT seconds 33.5     Lab Results   Component Value Date    LPJGAXFT83 357 09/11/2018     Lab Results   Component Value Date    FOLATE 7.34 09/11/2018     Lab Results   Component Value Date    IRON 53 (L) 09/11/2018    TIBC 264 09/11/2018    FERRITIN 430.70 (H) 09/11/2018          IMAGE STUDIES:   1. NUCLEAR MEDICINE WHOLE BODY BONE SCAN 9/10/2018     HISTORY: Fall 1 week ago injuring left rib cage. Hip pain. Suspected  lung cancer with hepatic and bony metastatic disease.     TECHNIQUE:  21.3 mCi of 99m technetium MDP was administered IV followed  by 3 hour delayed phase whole body imaging with selected spot views.     COMPARISON: CT chest 08/31/2018, CT abdomen and pelvis 08/30/2018,   bone survey 09/09/2018.     FINDINGS:  There is abnormal osseous uptake within several ribs, some of  which corresponds with bone lesions demonstrated with CT. Mild increased  uptake is present left anterior 4th rib where there is a lytic lesion on  CT. There is increased uptake right anterior 6th rib where there is a  lytic lesion. No significant increased uptake is present in the left  lateral 10th rib where CT demonstrated fracture and overlying soft  tissue thickening and suspected mass.     There is increased uptake within the right subtrochanteric region within  the left proximal femur at the level of the lesser trochanter consistent  with metastases. Abnormal uptake is present within the region of the  left acromion where there is a lytic lesion demonstrated with CT. There  is also mild increased uptake overlying the right distal femoral  metaphysis medially consistent with metastasis. There is also faint  uptake  overlying the region of the left maxilla suspicious for a left  maxillary lesion. There is mild increased uptake within the right  manubrium consistent with metastasis. Both kidneys and the urinary  bladder are visualized.     IMPRESSION;  Multifocal predominantly lytic, lytic osseous metastatic disease.     2. X-RAY BONE SURVEY COMPLETE.  9/9/2018      HISTORY: Multiple areas of bone pain suspicious lung mass.     COMPARISON: 3/30/2018.     FINDINGS:     The skull demonstrates salt-and-pepper appearance and further evaluation  is recommended. There is a 1.1 cm lucency at the inferior aspect of the  right femoral neck. Another faint lucency seen in the lateral aspect of  the left femur at the proximal diametaphyseal junction. Fracture of the  distal left 11th rib with a lucency may represent a pathological  fracture.     Spondylosis of the spine, no definite lytic lesions.     Soft tissue mass in the right lung base is redemonstrated.     Soft tissue structures are unremarkable.     IMPRESSION:  Abnormal lucencies in the skull, distal left 11th rib and proximal  femora bilaterally. Metastatic disease is suspected, given the history.   Further evaluation with bone scan is recommended.       3. CT CHEST WITH CONTRAST- 8/31/2018   CLINICAL: Evaluate for metastatic disease.     TECHNIQUE: Radiation dose reduction techniques were utilized, including  automated exposure control and exposure modulation based on body size.     FINDINGS: Located along the pleural surface of the right lower lobe  there is a necrotic 1.6 x 2.1 cm nodule. There are necrotic lymph nodes  demonstrated in the right hilum, the largest of these 2.2 cm. There are  necrotic mediastinal lymph nodes, the largest is in the right  paratracheal space measuring 2.2 cm. There is partial collapse of the  right middle lobe. There are several tiny noncalcified pulmonary nodules  noted.     The heart size is normal. No pericardial abnormality. The esophagus  is  satisfactory in course and caliber. Limited images through the upper  abdomen demonstrate ill-defined hypodense hepatic nodules, both adrenal  glands are thickened however no focal adrenal mass. There is a small  nonobstructing right renal calculus.     Fracture of the left 10th rib. Subtle lytic lesion within the left 4th  rib.     CONCLUSION: There are several noncalcified pulmonary nodules, the  largest within the right lower lobe. The largest is necrotic and there  is necrotic right hilar and mediastinal lymphadenopathy. Fracture,  probably pathologic left 10th rib, lytic lesion left 4th rib.       4. CT SCANS ABDOMEN AND PELVIS WITH IV CONTRAST.  8/30/2018     HISTORY: LLQ bruising     COMPARISON: None.     TECHNIQUE: Radiation dose reduction techniques were utilized, including  automated exposure control and exposure modulation based on body size.  Axial images were obtained from the lung bases to the symphysis pubis  with IV contrast only.. Oral contrast was not administered per request.     FINDINGS ABDOMEN CT:  There are basilar pulmonary nodules including a  dominant 2 cm spiculated nodule anteriorly in the right lower lobe.  There is a 3 cm ovoid low density mass in the right infrahilar region  which is only partially imaged but likely reflects adenopathy. Some  consolidation of the lateral segment of the right middle lobe may be  related to postobstructive atelectasis. There are also multiple low  density liver lesions, largest in the left hepatic lobe measures 1.5 cm  and averages 50 Hounsfield units (solid). The other lesions are smaller  but visually appears similar in density and are suspicious for  metastatic foci. There are numerous low-density renal lesions, several  do not appear to reflect cysts. There is bilateral adrenal enlargement  with a 21 mm low density mass in the right adrenal gland suspicious for  metastatic focus. Remaining solid organs have an  unremarkable  appearance.     FINDINGS PELVIS CT:  Normal appendix. The GI tract not opacified for  assessment but non obstructive in appearance. No ascites. Encompassed  aorta is non-aneurysmal. 9 mm low-density lesion in the left femoral  head anteriorly, image 127, likely small subchondral cyst. On image 46,  there is a subtle minimally displaced fracture of the left 10th rib with  some thickening of the adjacent 9/10 intercostal musculature which could  be related to hematoma. Please correlate clinically. Regional osseous  structures otherwise unremarkable on osseous windows.         IMPRESSION:  IMPRESSION :   1. Findings most consistent with metastatic neoplastic process. A  pulmonary primary would be a strong diagnostic consideration given the  pattern.  2. Low-density renal lesions, several do not reflect simple cysts.  3. Subtle nondisplaced left 10th rib fracture with adjacent muscular  thickening         5.  BRAIN MRI WITH AND WITHOUT CONTRAST on 9/10/2018      HISTORY: lung cancer, stage IV, bone mets; I48.3-Typical atrial flutter;  R91.1-Solitary pulmonary nodule     COMPARISON: None.     FINDINGS:  Multiplanar images of the head were obtained without and with  gadolinium. There are multiple enhancing intra-axial lesions bilaterally  compatible with diffuse metastases. The largest individual mass involves  the posterior right cerebellum and measures approximately 2.5 x 2.4 x  2.2 cm in greatest dimensions. There is a moderate amount of surrounding  edema which produces some mild narrowing of the fourth ventricle with  minimal leftward displacement. There is some narrowing of the overlying  cerebellar sulci.     Most of the lesions are less than 1 cm. Some exhibits some artifactual  T2 shine through on the diffusion portion of the examination. The larger  lesions, right cerebellum and lateral right thalamus are very  heterogeneous in signal intensity which may be related to hemorrhage  and/or necrosis.  There is generalized atrophy. A few scattered small  foci of increased T2 and FLAIR signal abnormality likely related to  chronic ischemic gliotic changes. There is no hydrocephalus. A few  dilated perivascular spaces are present. Midline structures corpus  callosum, pituitary gland, and brainstem are unremarkable. There is no  evidence of extra axial mass or fluid collection.     There is complete opacification of the left maxillary sinus. The globes  and orbital soft tissues are unremarkable. Mastoids are clear.           IMPRESSION:  1. Numerous enhancing intra-axial brain lesions compatible with  extensive metastatic disease. Two of the larger lesions on the right  side are very heterogeneous likely as a result of some internal  hemorrhage and/or necrosis.  2. Paranasal sinus findings as above              Assessment/Plan         1.  Patient is a 63-year-old male who has long history of cigarette smoking for 40 years and at least 1 pack a day. Patient has no previous significant medical problem until recently in late 08/2018. He was admitted for left-sided chest pain/rib pain and was found having evidence of lung cancer with metastatic disease involving the bones and liver. His CT-guided liver biopsy on 08/31/2018 was nondiagnostic. No evidence of malignancy. He did have CT-guided right lung mass biopsy early this afternoon. I reviewed all his CT scan images as well as his bone survey and his bone scan today. I think this patient will need a brain MRI examination with and without IV contrast to complete his evaluation. He is highly suspicious for metastatic brain disease also. He does not have symptom for nausea, vomiting, headache, vision changes.      I discussed with him depending on the pathology results, the lung biopsy sample may need to be further tested for genetic studies for gene mutations and gene translocations as well as PD-L1 to see if he is a candidate for targeted therapy or immunotherapy.  Certainly if he has small cell lung cancer then he will need relatively quick systematic chemotherapy.     2.  Newly discovered diffuse metastatic brain disease.  Patient is asymptomatic.  The brain MRI shows moderate edema surrounding the largest one metastases at the right cerebellum which causes compression on the fourth ventricle.      I discussed with the patient, recommended to start dexamethasone to shrink the edema.  We'll also start him on Keppra to prevent seizure.  I recommended consult radiation oncologist, to be seen today for full brain radiation therapy.         3.  Metastatic bone disease associated with pain.  Patient is on narcotics.  Patient was given first dose Zometa on 9/10/2018.  We'll continue when necessary narcotics.     4. Anemia probably likely related to his metastatic lung cancer.  Laboratory study this morning showed no definite signs of iron deficiency, etc. fits with anemia secondary to chronic disease in this case is his malignancy.  He has normal folic acid and the low normal vitamin B12 level.  I will start him on B12 oral treatment to help with erythropoiesis.      5.  Atrial fibrillation at time of admission.  Currently it sinus rhythm.  Patient is on medications for cardiac problem.     I recommended not anticoagulate this patient until he finished a course of reading therapy to the brain because the MRI of the brain reported at possible intratumor hemorrhage.  I discussed with the patient and he voiced understanding.       PLAN:   1.   Start oral dexamethasone 4 mg every 6 hours.    2.   Start twice a day Pepcid for GI prophylaxis.   3.   Started Keppra 500 mg twice a day for primary prophylaxis of seizure.   4.   Consult radiation oncology, for full brain radiation therapy.   5.   Start vitamin B12 1000 µg daily oral treatment.   6.   Patient has appointment with Dr. Arriaga on 9/25/2018       I independently reviewed all his brain MRI scan images, there are extensive  metastatic brain disease, most importantly with the swelling at the right cerebellum lesion.       Saw patient and discussed with him as documented above.        I also spoke with his nurse today.      Thanks for letting us participating in the care of this patient!  Will sign off, please call if having question.      JOB HWANG M.D., Ph.D.    9/11/2018          Dictated using Dragon Dictation.

## 2018-09-11 NOTE — PROGRESS NOTES
"  PROGRESS NOTE  Patient Name: Beltran Leo  Age/Sex: 63 y.o. male  : 1955  MRN: [id number]    Date of Admission: 2018  Date of Encounter Visit: 18   LOS: 4 days   Patient Care Team:  Provider, No Known as PCP - Eliot Wray MD as Consulting Physician (Hematology and Oncology)    Chief Complaint: possible lung cancer with mets to liver, brain and bone    Hospital course: patient was admitted on 18 with complaints of palpitations and was found to have atrial fibrillation and converted to sinus rhythm. From prior admission he was found to have lung and liver lesions on prior CT scan and had a liver biopsy on 18 that was inconclusive. He ended up having a bone scan that showed multiple lytic lesions. MRI of the brain showed multiple lesions suggestive of metastatic disease.     CT guided biopsy of the lung was completed on 9/10/18    Interval History: MRI of the brain showed metastatic lesions.     REVIEW OF SYSTEMS:   CONSTITUTIONAL: no fever or chills, improved headache and vision changes  CARDIOVASCULAR: No chest pain, chest pressure or chest discomfort. No palpitations or edema.   RESPIRATORY: No shortness of breath, cough or sputum.   GASTROINTESTINAL: No anorexia, vomiting or diarrhea. No abdominal pain or blood. nausea  HEMATOLOGIC: No bleeding or bruising.   Back pain and itching    Ventilator/Non-Invasive Ventilation Settings     None        Vital Signs  Temp:  [98 °F (36.7 °C)-99 °F (37.2 °C)] 99 °F (37.2 °C)  Heart Rate:  [78-96] 89  Resp:  [16-20] 20  BP: (121-132)/(70-82) 121/79  SpO2:  [95 %-98 %] 95 %  on  Flow (L/min):  [2] 2 Device (Oxygen Therapy): room air    Intake/Output Summary (Last 24 hours) at 18 1551  Last data filed at 18 0900   Gross per 24 hour   Intake             1115 ml   Output                0 ml   Net             1115 ml     Flowsheet Rows      First Filed Value   Admission Height  167.6 cm (66\") Documented at 2018 "   Admission Weight  70.9 kg (156 lb 3.2 oz) Documented at 09/07/2018 0100        Body mass index is 23.24 kg/m².  1    09/07/18  0100 09/08/18  0101   Weight: 70.9 kg (156 lb 3.2 oz) 65.3 kg (143 lb 15.4 oz)       Physical Exam:  GEN:  No acute distress, alert, cooperative, well developed, on room air, flat affect and appears depressed  EYES:   Sclera clear. No icterus. PERRL.   ENT:   External ears/nose normal, no oral lesions, no thrush, mucous membranes moist  NECK:  Supple, midline trachea, no JVD  LUNGS: Normal chest on inspection, diminished bases, mild expiratory wheezes on forced exhalation, No rhonchi. No crackles. Respirations regular, even and unlabored.   CV:  Regular rhythm and rate. Normal S1/S2. No murmurs, gallops, or rubs noted.  ABD:  Soft, non-tender and non-distended. Normal bowel sounds. No guarding  EXT:  Moves all extremities well. No cyanosis. No redness. No edema.   Skin: dry, intact, no bleeding    Results Review:        Results from last 7 days  Lab Units 09/11/18  0446 09/10/18  0542 09/09/18  1948 09/08/18  0529 09/07/18 2126   SODIUM mmol/L 133* 133* 132* 135* 134*   POTASSIUM mmol/L 4.1 3.7 3.9 4.1 4.0   CHLORIDE mmol/L 93* 93* 93* 92* 91*   CO2 mmol/L 26.6 27.6 26.6 27.2 26.1   BUN mg/dL 22 23 27* 31* 27*   CREATININE mg/dL 0.70* 0.88 0.91 0.88 0.88   CALCIUM mg/dL 10.2 10.2 9.7 10.1 10.2   AST (SGOT) U/L  --   --   --   --  13   ALT (SGPT) U/L  --   --   --   --  8   ANION GAP mmol/L 13.4 12.4 12.4 15.8 16.9   ALBUMIN g/dL  --   --   --   --  4.20       Results from last 7 days  Lab Units 09/07/18  2126   TROPONIN T ng/mL <0.010       Results from last 7 days  Lab Units 09/07/18  2126   TSH mIU/mL 3.130       Results from last 7 days  Lab Units 09/07/18  2126   PROBNP pg/mL 628.6       Results from last 7 days  Lab Units 09/11/18  0446 09/10/18  0542 09/08/18  0529 09/07/18  2126   WBC 10*3/mm3 11.74* 11.29* 11.33* 13.41*   HEMOGLOBIN g/dL 13.0* 12.4* 13.1* 14.2   HEMATOCRIT % 40.5  38.3* 40.4 42.8   PLATELETS 10*3/mm3 401 374 369 412   MCV fL 94.0 92.3 93.3 93.2   NEUTROPHIL % % 82.2* 82.3*  --  81.1*   LYMPHOCYTE % % 10.1* 9.9*  --  12.5*   MONOCYTES % % 7.1 7.5  --  6.0   EOSINOPHIL % % 0.2* 0.1*  --  0.1*   BASOPHIL % % 0.1 0.0  --  0.1   IMM GRAN % % 0.3 0.2  --  0.2       Results from last 7 days  Lab Units 09/07/18  2126   INR  1.10   APTT seconds 33.5       Results from last 7 days  Lab Units 09/07/18  2126   MAGNESIUM mg/dL 2.1           Invalid input(s): LDLCALC          No results found for: POCGLU                    Results from last 7 days  Lab Units 09/10/18  1944   SODIUM UR mmol/L 31   OSMOLALITY UR mOsm/kg 783       Imaging:   Imaging Results (all)     Procedure Component Value Units Date/Time    NM Bone Scan Whole Body [[id number]] Collected:  09/10/18 1212     Updated:  09/11/18 0748    Narrative:       NUCLEAR MEDICINE WHOLE BODY BONE SCAN     HISTORY: Fall 1 week ago injuring left rib cage. Hip pain. Suspected  lung cancer with hepatic and bony metastatic disease.     TECHNIQUE:  21.3 mCi of 99m technetium MDP was administered IV followed  by 3 hour delayed phase whole body imaging with selected spot views.     COMPARISON: CT chest 08/31/2018, CT abdomen and pelvis 08/30/2018,   bone survey 09/09/2018.     FINDINGS:  There is abnormal osseous uptake within several ribs, some of  which corresponds with bone lesions demonstrated with CT. Mild increased  uptake is present left anterior 4th rib where there is a lytic lesion on  CT. There is increased uptake right anterior 6th rib where there is a  lytic lesion. No significant increased uptake is present in the left  lateral 10th rib where CT demonstrated fracture and overlying soft  tissue thickening and suspected mass.     There is increased uptake within the right subtrochanteric region within  the left proximal femur at the level of the lesser trochanter consistent  with metastases. Abnormal uptake is present within the  region of the  left acromion where there is a lytic lesion demonstrated with CT. There  is also mild increased uptake overlying the right distal femoral  metaphysis medially consistent with a metastasis. There is also faint  uptake overlying the region of the left maxilla suspicious for a left  maxillary lesion. There is mild increased uptake within the right  manubrium consistent with metastasis. Both kidneys and the urinary  bladder are visualized.     IMPRESSION;  Multifocal lytic osseous metastatic disease.     This report was finalized on 9/11/2018 7:44 AM by Dr. Poli Hansen M.D.       MRI Brain With & Without Contrast [[id number]] Collected:  09/10/18 2257     Updated:  09/10/18 2257    Narrative:       BRAIN MRI WITH AND WITHOUT CONTRAST     HISTORY: lung cancer, stage IV, bone mets; I48.3-Typical atrial flutter;  R91.1-Solitary pulmonary nodule     COMPARISON: None.     FINDINGS:  Multiplanar images of the head were obtained without and with  gadolinium. There are multiple enhancing intra-axial lesions bilaterally  compatible with diffuse metastases. The largest individual mass involves  the posterior right cerebellum and measures approximately 2.5 x 2.4 x  2.2 cm in greatest dimensions. There is a moderate amount of surrounding  edema which produces some mild narrowing of the fourth ventricle with  minimal leftward displacement. There is some narrowing of the overlying  cerebellar sulci.     Most of the lesions are less than 1 cm. Some exhibits some artifactual  T2 shine through on the diffusion portion of the examination. The larger  lesions, right cerebellum and lateral right thalamus are very  heterogeneous in signal intensity which may be related to hemorrhage  and/or necrosis. There is generalized atrophy. A few scattered small  foci of increased T2 and FLAIR signal abnormality likely related to  chronic ischemic gliotic changes. There is no hydrocephalus. A few  dilated perivascular spaces are  present. Midline structures corpus  callosum, pituitary gland, and brainstem are unremarkable. There is no  evidence of extra axial mass or fluid collection.     There is complete opacification of the left maxillary sinus. The globes  and orbital soft tissues are unremarkable. Mastoids are clear.             Impression:       1. Numerous enhancing intra-axial brain lesions compatible with  extensive metastatic disease. Two of the larger lesions on the right  side are very heterogeneous likely as a result of some internal  hemorrhage and/or necrosis.  2. Paranasal sinus findings as above               XR Chest Pa [[id number]] Collected:  09/10/18 1959     Updated:  09/10/18 2006    Narrative:       XR CHEST PA-     HISTORY: Male who is 63 years-old,  post biopsy evaluation     TECHNIQUE: Frontal views of the chest     COMPARISON: 8/30/2018     FINDINGS: Heart, mediastinum and pulmonary vasculature are unremarkable.  Previous CT demonstrated right lower lobe nodule, right hilar lymph  nodes are also suggested in this exam. No focal pulmonary consolidation,  pleural effusion, or pneumothorax. Gas-filled loops of bowel, partly  included. No acute osseous process.       Impression:       No pneumothorax is identified. Redemonstration of right  lower lobe nodule. Follow-up as clinically indicated.     This report was finalized on 9/10/2018 8:03 PM by Dr. Doyle Keita M.D.       CT Needle Biopsy Lung [[id number]] Collected:  09/10/18 1756     Updated:  09/10/18 1802    Narrative:       CT GUIDED RIGHT LUNG BIOPSY     HISTORY: Right lower lobe pulmonary nodule     After signed informed consent was obtained the patient was prepped and  draped in the supine position. Lidocaine was used for local anesthesia.     An 18-gauge biopsy device was used to obtain core specimens of the right  lower lobe mass.     Confirmatory images were obtained.      Patient tolerated the procedure well. A tiny pneumothorax was  visualized  after removal of the needle. Post procedure chest radiographs were  ordered in order to further evaluate for any change in the pneumothorax.     Radiation dose reduction techniques were utilized, including automated  exposure control and exposure modulation based on body size.     This report was finalized on 9/10/2018 5:58 PM by Dr. Chris Cerrato M.D.       XR Bone Survey Complete [[id number]] Collected:  09/09/18 1936     Updated:  09/10/18 0426    Narrative:       X-RAY BONE SURVEY COMPLETE.     HISTORY: Multiple areas of bone pain suspicious lung mass.     COMPARISON: 3/30/2018.     FINDINGS:     The skull demonstrates salt-and-pepper appearance and further evaluation  is recommended. There is a 1.1 cm lucency at the inferior aspect of the  right femoral neck. Another faint lucency seen in the lateral aspect of  the left femur at the proximal diametaphyseal junction. Fracture of the  distal left 11th rib with a lucency may represent a pathological  fracture.     Spondylosis of the spine, no definite lytic lesions.     Soft tissue mass in the right lung base is redemonstrated.     Soft tissue structures are unremarkable.       Impression:       Abnormal lucencies in the skull, distal left 11th rib and proximal  femora bilaterally. Metastatic disease is suspected, given the history.   Further evaluation with bone scan is recommended.     This report was finalized on 9/10/2018 4:22 AM by Dr. Flash Kauffman M.D.       XR Chest 1 View [[id number]] Collected:  09/07/18 2137     Updated:  09/08/18 1922    Narrative:       CHEST PA AND LATERAL.     HISTORY: Tachycardia.     COMPARISON: 8/20/2018.     FINDINGS:  Cardiomediastinal silhouette is within normal limits.         There is no consolidation or effusion. 2.5 cm soft tissue nodule at the  right lung base is unchanged. A few smaller densities are not as well  visualized.          Impression:       No acute findings. Nodular opacities of the right lung  base measuring up  to 2.5 cm, no significant change, neoplastic process is suspected.     This report was finalized on 9/8/2018 7:19 PM by Dr. Flash Kauffman M.D.                  I reviewed the patient's new clinical results.  I personally viewed and interpreted the patient's imaging results:        Medication Review:     dexamethasone 4 mg Oral Q6H   diltiaZEM  mg Oral Q24H   famotidine 20 mg Oral BID   insulin aspart 0-9 Units Subcutaneous 4x Daily With Meals & Nightly   ipratropium-albuterol 3 mL Nebulization Q4H - RT   levETIRAcetam 500 mg Oral Q12H   pantoprazole 40 mg Oral Q AM   vitamin B-12 1,000 mcg Oral Daily         sodium chloride 100 mL/hr Last Rate: 100 mL/hr (09/10/18 3673)       ASSESSMENT:   1. Multiple pulmonary nodules with mediastinal lymphadenopathy and distant lesion suspicious for lung cancer with metastasis to liver, brain and bone.   2. Metastatic bone lesions with pain   3. Liver lesion with non-diagnostic CT guided biopsy from 8/31/18  4. Suspected COPD  5. Rib fracture and torn muscle from trying to lift a heavy object  6. New onset atrial fibrillation back in sinus rhythm  7. Anemia   8. Chronic tobacco abuse  9. Possible depression with flat affect  10. Depression  11. Generalized pain    PLAN:  Await pathology from recent lung biopsy- expect preliminary tomorrow.   Agree with steroids given the brain mets and surrounding vasogenic edema, his altered mental status may improve with the steroids  Defer anticoagulation to cardiology and oncology  Continue bronchodilators but change to prn  Cancel the PFT, he is not a surgical candidate. .   Dicussed smoking cessation and patient reports there is no point at this time. Will continue to discuss.   ACCU check ACHS while on steroids with moderate dose SS insulin.     Discussed with patient and staff    Disposition: per admitting    Padma Rossi MD  09/11/18  3:51 PM      Dictated utilizing Dragon dictation

## 2018-09-11 NOTE — CONSULTS
DIAGNOSIS and REASON FOR CONSULTATION: Probable lung cancer with metastasis to liver, bone and brain - awaiting biopsy results - Stage IV. -  for advice and recommendations for this diagnosis    Referring Provider:  Joseph Ramirez MD    HISTORY OF PRESENT ILLNESS:  The patient is a 63 y.o. year old male who we were asked to see for his recent diagnosis of metastatic disease from presumed lung primary. He initially presented with pain in left hip on August 30, 2018. CT of the abdomen and pelvis on that date showed lung nodules, right hilar lymphadenopathy, multiple liver lesions, renal lesions, bilateral adrenal enlargement, fracture of the left 10th rib.  He underwent a CT guided biopsy of the liver which was non-diagnostic. He was scheduled for an outpatient lung biopsy but returned for admission prior.     He underwent a CT guided biopsy of the lung yesterday and that pathology is pending. Holguin scan completed yesterday showed bony metastasis in several ribs, right and left femurs, left acromion, left maxilla and right manubrium.  MRI of the brain on the same date showed numerous enhancing brain lesions bilaterally and in the cerebrum and cerebellum, with moderate edema, narrowing of the fourth ventricle.  He received a dose of Zometa yesterday. I was asked to see the patient at the request of the referring provider noted above for advice and recommendations regarding this diagnosis.    Clinically, he is very somnolent currently.  He complains of discomfort in the left chest and around the left hip with movement.     Past Medical History: he  has a past medical history of Abnormal CT of the abdomen; H/O Liver lesions; and H/O Lung nodule.    Past Surgical History:  he has a past surgical history that includes Liver biopsy (08/31/2018).    Meds:    Current Facility-Administered Medications:   •  acetaminophen (TYLENOL) tablet 650 mg, 650 mg, Oral, Q4H PRN, Jayla Richard, APRN  •  acetaminophen (TYLENOL) tablet 650  mg, 650 mg, Oral, Q6H PRN, Jayla Richard, APRN  •  albuterol (PROVENTIL) nebulizer solution 0.083% 2.5 mg/3mL, 2.5 mg, Nebulization, Once PRN, Padma Rossi MD  •  calcium carbonate (TUMS) chewable tablet 500 mg (200 mg elemental), 2 tablet, Oral, TID PRN, Shaggy Mary MD, 2 tablet at 09/10/18 0821  •  dexamethasone (DECADRON) tablet 4 mg, 4 mg, Oral, Q6H, Jhonathan Rudolph MD PhD, 4 mg at 09/11/18 1143  •  diltiaZEM CD (CARDIZEM CD) 24 hr capsule 240 mg, 240 mg, Oral, Q24H, Jerica Hou MD, 240 mg at 09/11/18 0824  •  famotidine (PEPCID) tablet 20 mg, 20 mg, Oral, BID, Jhonathan Rudolph MD PhD, 20 mg at 09/11/18 1144  •  HYDROcodone-acetaminophen (NORCO)  MG per tablet 1 tablet, 1 tablet, Oral, Q4H PRN, 1 tablet at 09/11/18 0129 **OR** HYDROcodone-acetaminophen (NORCO)  MG per tablet 2 tablet, 2 tablet, Oral, Q4H PRN, Jerica Hou MD, 2 tablet at 09/11/18 0953  •  ipratropium-albuterol (DUO-NEB) nebulizer solution 3 mL, 3 mL, Nebulization, Q4H - RT, Jerica Hou MD, 3 mL at 09/10/18 0726  •  levETIRAcetam (KEPPRA) tablet 500 mg, 500 mg, Oral, Q12H, Jhonathan Rudolph MD PhD, 500 mg at 09/11/18 1144  •  nitroglycerin (NITROSTAT) SL tablet 0.4 mg, 0.4 mg, Sublingual, Q5 Min PRN, Jayla Richard, APRN  •  ondansetron (ZOFRAN) tablet 4 mg, 4 mg, Oral, Q6H PRN, 4 mg at 09/09/18 1648 **OR** ondansetron ODT (ZOFRAN-ODT) disintegrating tablet 4 mg, 4 mg, Oral, Q6H PRN **OR** ondansetron (ZOFRAN) injection 4 mg, 4 mg, Intravenous, Q6H PRN, Jayla Richard APRN  •  ondansetron (ZOFRAN) injection 4 mg, 4 mg, Intravenous, Q6H PRN, Jayla Richard APRN  •  pantoprazole (PROTONIX) EC tablet 40 mg, 40 mg, Oral, Q AM, Jerica Hou MD, 40 mg at 09/11/18 0574  •  sodium chloride 0.9 % flush 1-10 mL, 1-10 mL, Intravenous, PRN, Jayla Richard APRN  •  sodium chloride 0.9 % flush 1-10 mL, 1-10 mL, Intravenous, PRN, Jerica Hou MD  •  Insert peripheral IV, , , Once **AND**  sodium chloride 0.9 % flush 10 mL, 10 mL, Intravenous, PRN, Alo Caldwell MD  •  sodium chloride 0.9 % infusion, 100 mL/hr, Intravenous, Continuous, Paulo Ruiz MD, Last Rate: 100 mL/hr at 09/10/18 2315, 100 mL/hr at 09/10/18 2315  •  vitamin B-12 (CYANOCOBALAMIN) tablet 1,000 mcg, 1,000 mcg, Oral, Daily, Jhonathan Rudolph MD PhD, 1,000 mcg at 09/11/18 1149    Allergies:  No Known Allergies    Family History:  his family history includes Heart attack in his father and mother.    Social History:  he  reports that he has been smoking.  He does not have any smokeless tobacco history on file. He reports that he does not drink alcohol or use drugs.    Pertinent Findings on   Review of Systems   Constitutional: Positive for appetite change, fatigue and unexpected weight change. Negative for chills, diaphoresis and fever.   HENT:   Negative for hearing loss, lump/mass, mouth sores, nosebleeds, sore throat, tinnitus, trouble swallowing and voice change.    Eyes: Negative for eye problems.   Respiratory: Positive for chest tightness and shortness of breath. Negative for cough, hemoptysis and wheezing.    Cardiovascular: Positive for chest pain. Negative for leg swelling and palpitations.   Gastrointestinal: Positive for nausea. Negative for abdominal distention, abdominal pain, blood in stool, constipation, diarrhea, rectal pain and vomiting.   Endocrine: Negative for hot flashes.   Genitourinary: Negative for bladder incontinence, difficulty urinating, dysuria, frequency, hematuria, nocturia and pelvic pain.    Musculoskeletal: Negative for arthralgias, back pain, flank pain, gait problem, myalgias, neck pain and neck stiffness.   Skin: Negative for itching and rash.   Neurological: Positive for extremity weakness. Negative for dizziness, gait problem, headaches, light-headedness, numbness, seizures and speech difficulty.   Hematological: Negative for adenopathy. Does not bruise/bleed easily.    Psychiatric/Behavioral: Negative for confusion, decreased concentration, depression, sleep disturbance and suicidal ideas. The patient is not nervous/anxious.    :  Vitals:    09/10/18 2000 09/10/18 2333 09/11/18 0759 09/11/18 1251   BP: 132/82 121/78 121/70 121/79   BP Location: Right arm Left arm Left arm Left arm   Patient Position: Lying Lying Lying Lying   Pulse: 80 78 88 96   Resp: 20 16 20 18   Temp: 98.5 °F (36.9 °C) 98.7 °F (37.1 °C) 98 °F (36.7 °C) 99 °F (37.2 °C)   TempSrc: Oral Oral Oral Oral   SpO2: 95% 98% 96% 95%   Weight:       Height:           Performance Status: (2) Ambulatory and capable of self care, unable to carry out work activity, up and about > 50% or waking hours    Pertinent Findings on  Physical Exam   Constitutional: He is oriented to person, place, and time. He appears well-developed and well-nourished.   HENT:   Head: Normocephalic and atraumatic.   Eyes: EOM are normal.   Neck: Normal range of motion.   Pulmonary/Chest: Effort normal.   Abdominal: Soft.   Musculoskeletal: Normal range of motion.   Neurological: He is alert and oriented to person, place, and time.   Skin: Skin is warm and dry.   Psychiatric: He has a normal mood and affect. His behavior is normal. Judgment and thought content normal.   Vitals reviewed.  :  Assessment:  Metastatic disease to the brain from presumed lung primary - awaiting pathology    Plan:   We reviewed the progression of disease thus far and the need to wait for pathologic confirmation but then we discussed a typical course of palliative radiation therapy directed to the brain. We can continue to evaluate for palliative treatment of the left rib and/or left femur as needed as well.     We discussed a course of approximately 3000 cGy given in 10 treatments to the whole brain over 2 weeks.  We discussed the acute side effects of alopecia, irritation of the scalp, headache, nausea and vomiting.  We also discussed the role of steroids in managing those  symptoms and the need for checking the blood glucose while on the steroid.  We also discussed the long term possibility of affecting the mentation and memory though the chances of that are small and again, all questions were answered. He was agreeable and wished to pursue treatment.    We were able to start our treatment planning process today and will watch for pathology and anticipate getting treatments underway immediately thereafter.    I spent greater than 30 minutes on the unit and of that time 20 minutes was spent in counseling and coordination of care, including my review of imaging and pathology; indications, goals, logistics, alternatives and risks - both common and rare - for my recommendations as well as surveillance and potential outcomes.

## 2018-09-12 NOTE — PLAN OF CARE
Problem: Patient Care Overview  Goal: Plan of Care Review  Outcome: Ongoing (interventions implemented as appropriate)   09/12/18 0515   Coping/Psychosocial   Plan of Care Reviewed With patient   Plan of Care Review   Progress no change   OTHER   Outcome Summary Pt brother came to visit and brought pt a meal. Ate a tiny amount. Pt is confused. Oriented to self only. Medicated per MAR for pain. IVF continue. Continue to monitor.       Problem: Pain, Acute (Adult)  Goal: Acceptable Pain Control/Comfort Level  Outcome: Ongoing (interventions implemented as appropriate)

## 2018-09-12 NOTE — PROGRESS NOTES
PROGRESS NOTE  Patient Name: Beltran Leo  Age/Sex: 63 y.o. male  : 1955  MRN: [id number]    Date of Admission: 2018  Date of Encounter Visit: 18   LOS: 5 days   Patient Care Team:  Provider, No Known as PCP - Eliot Wray MD as Consulting Physician (Hematology and Oncology)  Jerica Hou MD as Referring Physician (Internal Medicine)    Chief Complaint: possible lung cancer with mets to liver, brain and bone    Hospital course: patient was admitted on 18 with complaints of palpitations and was found to have atrial fibrillation and converted to sinus rhythm. From prior admission he was found to have lung and liver lesions on prior CT scan and had a liver biopsy on 18 that was inconclusive. He ended up having a bone scan that showed multiple lytic lesions. MRI of the brain showed multiple lesions suggestive of metastatic disease.     CT guided biopsy of the lung was completed on 9/10/18    Interval History: lung pathology was inconclusive.     REVIEW OF SYSTEMS:   CONSTITUTIONAL: no fever or chills,some confusion and agitation per staff and family.   CARDIOVASCULAR: No chest pain, chest pressure or chest discomfort. No palpitations or edema.   RESPIRATORY: No shortness of breath, cough or sputum.   GASTROINTESTINAL: No anorexia, vomiting or diarrhea. No abdominal pain or blood. Nausea and decreased appetite  HEMATOLOGIC: No bleeding or bruising.   Back pain and itching    Ventilator/Non-Invasive Ventilation Settings     None        Vital Signs  Temp:  [98.2 °F (36.8 °C)-99.4 °F (37.4 °C)] 98.2 °F (36.8 °C)  Heart Rate:  [] 83  Resp:  [16-20] 16  BP: (115-142)/() 120/75  SpO2:  [97 %] 97 %  on    Device (Oxygen Therapy): room air    Intake/Output Summary (Last 24 hours) at 18 1533  Last data filed at 18 1310   Gross per 24 hour   Intake             1000 ml   Output              700 ml   Net              300 ml     Flowsheet Rows      First  "Filed Value   Admission Height  167.6 cm (66\") Documented at 09/07/2018 2056   Admission Weight  70.9 kg (156 lb 3.2 oz) Documented at 09/07/2018 0100        Body mass index is 23.24 kg/m².  1    09/07/18  0100 09/08/18  0101   Weight: 70.9 kg (156 lb 3.2 oz) 65.3 kg (143 lb 15.4 oz)       Physical Exam:  GEN:  No acute distress, alert, cooperative, well developed, on room air, flat affect and appears depressed  EYES:   Sclera clear. No icterus. PERRL.   ENT:   External ears/nose normal, no oral lesions, no thrush, mucous membranes moist  NECK:  Supple, midline trachea, no JVD  LUNGS: Normal chest on inspection, diminished bases, mild expiratory wheezes  Worse on the right, No rhonchi. No crackles. Respirations regular, even and unlabored.   CV:  Regular rhythm with occasional skipped beats and tachycardia. Normal S1/S2. No murmurs, gallops, or rubs noted.  ABD:  Soft, non-tender and non-distended. Normal bowel sounds. No guarding  EXT:  Moves all extremities well. No cyanosis. No redness. No edema.   Skin: dry, intact, no bleeding    Results Review:        Results from last 7 days  Lab Units 09/12/18  0431 09/11/18  0446 09/10/18  0542 09/09/18  1948 09/08/18  0529 09/07/18  2126   SODIUM mmol/L 135* 133* 133* 132* 135* 134*   POTASSIUM mmol/L 4.4 4.1 3.7 3.9 4.1 4.0   CHLORIDE mmol/L 95* 93* 93* 93* 92* 91*   CO2 mmol/L 23.7 26.6 27.6 26.6 27.2 26.1   BUN mg/dL 23 22 23 27* 31* 27*   CREATININE mg/dL 0.72* 0.70* 0.88 0.91 0.88 0.88   CALCIUM mg/dL 9.6 10.2 10.2 9.7 10.1 10.2   AST (SGOT) U/L  --   --   --   --   --  13   ALT (SGPT) U/L  --   --   --   --   --  8   ANION GAP mmol/L 16.3 13.4 12.4 12.4 15.8 16.9   ALBUMIN g/dL  --   --   --   --   --  4.20       Results from last 7 days  Lab Units 09/07/18  2126   TROPONIN T ng/mL <0.010       Results from last 7 days  Lab Units 09/07/18  2126   TSH mIU/mL 3.130       Results from last 7 days  Lab Units 09/07/18 2126   PROBNP pg/mL 628.6       Results from last 7 " days  Lab Units 09/12/18  0431 09/11/18  0446 09/10/18  0542 09/08/18  0529 09/07/18 2126   WBC 10*3/mm3 10.48 11.74* 11.29* 11.33* 13.41*   HEMOGLOBIN g/dL 13.4* 13.0* 12.4* 13.1* 14.2   HEMATOCRIT % 41.1 40.5 38.3* 40.4 42.8   PLATELETS 10*3/mm3 461 401 374 369 412   MCV fL 92.2 94.0 92.3 93.3 93.2   NEUTROPHIL % % 90.4* 82.2* 82.3*  --  81.1*   LYMPHOCYTE % % 6.6* 10.1* 9.9*  --  12.5*   MONOCYTES % % 2.8* 7.1 7.5  --  6.0   EOSINOPHIL % % 0.0* 0.2* 0.1*  --  0.1*   BASOPHIL % % 0.0 0.1 0.0  --  0.1   IMM GRAN % % 0.2 0.3 0.2  --  0.2       Results from last 7 days  Lab Units 09/07/18  2126   INR  1.10   APTT seconds 33.5       Results from last 7 days  Lab Units 09/07/18  2126   MAGNESIUM mg/dL 2.1           Invalid input(s): LDLCALC          Glucose   Date/Time Value Ref Range Status   09/12/2018 1129 139 (H) 70 - 130 mg/dL Final   09/12/2018 0758 143 (H) 70 - 130 mg/dL Final   09/12/2018 0546 152 (H) 70 - 130 mg/dL Final   09/11/2018 2012 134 (H) 70 - 130 mg/dL Final   09/11/2018 1623 142 (H) 70 - 130 mg/dL Final                       Results from last 7 days  Lab Units 09/10/18  1944   SODIUM UR mmol/L 31   OSMOLALITY UR mOsm/kg 783         Imaging:   Imaging Results (all)     Procedure Component Value Units Date/Time    NM Bone Scan Whole Body [[id number]] Collected:  09/10/18 1212     Updated:  09/11/18 0748    Narrative:       NUCLEAR MEDICINE WHOLE BODY BONE SCAN     HISTORY: Fall 1 week ago injuring left rib cage. Hip pain. Suspected  lung cancer with hepatic and bony metastatic disease.     TECHNIQUE:  21.3 mCi of 99m technetium MDP was administered IV followed  by 3 hour delayed phase whole body imaging with selected spot views.     COMPARISON: CT chest 08/31/2018, CT abdomen and pelvis 08/30/2018,   bone survey 09/09/2018.     FINDINGS:  There is abnormal osseous uptake within several ribs, some of  which corresponds with bone lesions demonstrated with CT. Mild increased  uptake is present left  anterior 4th rib where there is a lytic lesion on  CT. There is increased uptake right anterior 6th rib where there is a  lytic lesion. No significant increased uptake is present in the left  lateral 10th rib where CT demonstrated fracture and overlying soft  tissue thickening and suspected mass.     There is increased uptake within the right subtrochanteric region within  the left proximal femur at the level of the lesser trochanter consistent  with metastases. Abnormal uptake is present within the region of the  left acromion where there is a lytic lesion demonstrated with CT. There  is also mild increased uptake overlying the right distal femoral  metaphysis medially consistent with a metastasis. There is also faint  uptake overlying the region of the left maxilla suspicious for a left  maxillary lesion. There is mild increased uptake within the right  manubrium consistent with metastasis. Both kidneys and the urinary  bladder are visualized.     IMPRESSION;  Multifocal lytic osseous metastatic disease.     This report was finalized on 9/11/2018 7:44 AM by Dr. Poli Hansen M.D.       MRI Brain With & Without Contrast [[id number]] Collected:  09/10/18 2257     Updated:  09/10/18 2257    Narrative:       BRAIN MRI WITH AND WITHOUT CONTRAST     HISTORY: lung cancer, stage IV, bone mets; I48.3-Typical atrial flutter;  R91.1-Solitary pulmonary nodule     COMPARISON: None.     FINDINGS:  Multiplanar images of the head were obtained without and with  gadolinium. There are multiple enhancing intra-axial lesions bilaterally  compatible with diffuse metastases. The largest individual mass involves  the posterior right cerebellum and measures approximately 2.5 x 2.4 x  2.2 cm in greatest dimensions. There is a moderate amount of surrounding  edema which produces some mild narrowing of the fourth ventricle with  minimal leftward displacement. There is some narrowing of the overlying  cerebellar sulci.     Most of the  lesions are less than 1 cm. Some exhibits some artifactual  T2 shine through on the diffusion portion of the examination. The larger  lesions, right cerebellum and lateral right thalamus are very  heterogeneous in signal intensity which may be related to hemorrhage  and/or necrosis. There is generalized atrophy. A few scattered small  foci of increased T2 and FLAIR signal abnormality likely related to  chronic ischemic gliotic changes. There is no hydrocephalus. A few  dilated perivascular spaces are present. Midline structures corpus  callosum, pituitary gland, and brainstem are unremarkable. There is no  evidence of extra axial mass or fluid collection.     There is complete opacification of the left maxillary sinus. The globes  and orbital soft tissues are unremarkable. Mastoids are clear.             Impression:       1. Numerous enhancing intra-axial brain lesions compatible with  extensive metastatic disease. Two of the larger lesions on the right  side are very heterogeneous likely as a result of some internal  hemorrhage and/or necrosis.  2. Paranasal sinus findings as above               XR Chest Pa [[id number]] Collected:  09/10/18 1959     Updated:  09/10/18 2006    Narrative:       XR CHEST PA-     HISTORY: Male who is 63 years-old,  post biopsy evaluation     TECHNIQUE: Frontal views of the chest     COMPARISON: 8/30/2018     FINDINGS: Heart, mediastinum and pulmonary vasculature are unremarkable.  Previous CT demonstrated right lower lobe nodule, right hilar lymph  nodes are also suggested in this exam. No focal pulmonary consolidation,  pleural effusion, or pneumothorax. Gas-filled loops of bowel, partly  included. No acute osseous process.       Impression:       No pneumothorax is identified. Redemonstration of right  lower lobe nodule. Follow-up as clinically indicated.     This report was finalized on 9/10/2018 8:03 PM by Dr. Doyle Keita M.D.       CT Needle Biopsy Lung [[id number]]  Collected:  09/10/18 1756     Updated:  09/10/18 1802    Narrative:       CT GUIDED RIGHT LUNG BIOPSY     HISTORY: Right lower lobe pulmonary nodule     After signed informed consent was obtained the patient was prepped and  draped in the supine position. Lidocaine was used for local anesthesia.     An 18-gauge biopsy device was used to obtain core specimens of the right  lower lobe mass.     Confirmatory images were obtained.      Patient tolerated the procedure well. A tiny pneumothorax was visualized  after removal of the needle. Post procedure chest radiographs were  ordered in order to further evaluate for any change in the pneumothorax.     Radiation dose reduction techniques were utilized, including automated  exposure control and exposure modulation based on body size.     This report was finalized on 9/10/2018 5:58 PM by Dr. Chrsi Cerrato M.D.       XR Bone Survey Complete [[id number]] Collected:  09/09/18 1936     Updated:  09/10/18 0426    Narrative:       X-RAY BONE SURVEY COMPLETE.     HISTORY: Multiple areas of bone pain suspicious lung mass.     COMPARISON: 3/30/2018.     FINDINGS:     The skull demonstrates salt-and-pepper appearance and further evaluation  is recommended. There is a 1.1 cm lucency at the inferior aspect of the  right femoral neck. Another faint lucency seen in the lateral aspect of  the left femur at the proximal diametaphyseal junction. Fracture of the  distal left 11th rib with a lucency may represent a pathological  fracture.     Spondylosis of the spine, no definite lytic lesions.     Soft tissue mass in the right lung base is redemonstrated.     Soft tissue structures are unremarkable.       Impression:       Abnormal lucencies in the skull, distal left 11th rib and proximal  femora bilaterally. Metastatic disease is suspected, given the history.   Further evaluation with bone scan is recommended.     This report was finalized on 9/10/2018 4:22 AM by Dr. Flash Kauffman M.D.        XR Chest 1 View [[id number]] Collected:  09/07/18 2137     Updated:  09/08/18 1922    Narrative:       CHEST PA AND LATERAL.     HISTORY: Tachycardia.     COMPARISON: 8/20/2018.     FINDINGS:  Cardiomediastinal silhouette is within normal limits.         There is no consolidation or effusion. 2.5 cm soft tissue nodule at the  right lung base is unchanged. A few smaller densities are not as well  visualized.          Impression:       No acute findings. Nodular opacities of the right lung base measuring up  to 2.5 cm, no significant change, neoplastic process is suspected.     This report was finalized on 9/8/2018 7:19 PM by Dr. Flash Kauffman M.D.                  I reviewed the patient's new clinical results.  I personally viewed and interpreted the patient's imaging results:        Medication Review:     dexamethasone 4 mg Oral Q6H   diltiaZEM  mg Oral Q24H   famotidine 20 mg Oral BID   insulin aspart 0-9 Units Subcutaneous 4x Daily With Meals & Nightly   levETIRAcetam 500 mg Oral Q12H   metoprolol succinate XL 12.5 mg Oral Q24H   nicotine 1 patch Transdermal Q24H   pantoprazole 40 mg Oral Q AM   vitamin B-12 1,000 mcg Oral Daily         sodium chloride 100 mL/hr Last Rate: 100 mL/hr (09/12/18 1311)       ASSESSMENT:   1. Multiple pulmonary nodules with mediastinal lymphadenopathy and distant lesion suspicious for lung cancer with metastasis to liver, brain and bone.   2. Metastatic bone lesions with pain   3. Liver lesion with non-diagnostic CT guided biopsy from 8/31/18  4. Suspected COPD  5. Rib fracture and torn muscle from trying to lift a heavy object  6. New onset atrial fibrillation back in sinus rhythm  7. Anemia   8. Chronic tobacco abuse  9. Possible depression with flat affect  10. Depression  11. Generalized pain    PLAN:  Pathology was inconclusive .  Discussed case with Dr. Rudolph and reviewed CT scans. Given the overall findings on his imaging studies, i would consider repeating CT guided  lung biopsy with better sampling. The risk of pneumothorax is low given the subpleural location of the nodule and would be less invasive than proceeding with lung or bone biopsy. Patient is agreeable.     Defer anticoagulation to cardiology and oncology.  Continue bronchodilators but change to prn  Cancel the PFT, he is not a surgical candidate. .   ACCU check ACHS while on steroids with moderate dose SS insulin.     Discussed with patient, brother and staff      Disposition: per admitting  Plan for radiation oncology, but worried about transportation    Padma Rossi MD  09/12/18  3:33 PM      Dictated utilizing Dragon dictation

## 2018-09-12 NOTE — PROGRESS NOTES
Subjective     .      REASON FOR CONSULTATION:     1.  Provide an opinion on any further workup or treatment of metastatic lung cancer, bones, possible liver.   2.  Diffuse metastatic bone disease, Zometa 1 dose was given on 9/10/2018.  This will be repeated in every 4 weeks.    3.  Liver biopsy on 8/31/2018 was nondiagnostic.  Lung biopsy on 9/10/2018 with pathology results pending.      INTERVAL HISTORY:   Patient was given first dose of Zometa in the evening of 9/10/2018 for his metastatic bone disease associated with pain.     Brain MRI in the night on 9/10/2018 reported diffuse metastatic disease, most significant in the right cerebellum and surrounding edema.           On 9/12/2018, pathology evaluation reported no malignancy from lung biopsy.      HISTORY OF PRESENT ILLNESS:  The patient is a 63 y.o. year old male who was admitted this time on 09/07/2018 because of sudden onset atrial flutter with palpitation. This patient was evaluated in the ER with heart rate in the 170s. He was given adenosine and metoprolol and then started on diltiazem drip. He was converted to normal sinus rhythm.      This patient was recently hospitalized in the end of 08/2018 because of left-sided rib pain. He was found having evidence of metastatic disease likely lung cancer with metastasis to the liver and bones.     He originally had CT scan examination on 08/30/2018 for the abnormal pelvis for evaluation of left hip pain/left flank and lower chest wall pain. That study reported multiple pulmonary nodules with dominant 2 cm spiculated nodule in the right lower lobe. There was also 3 cm mass in the right infrahilar region. There were multiple low-density liver lesions with largest one measuring 1.5 cm in the left hepatic lobe. There was also bilateral adrenal enlargement with 21 mm low-density mass in the right side suspicious for metastatic disease. CT examination on 08/31/2018 reported necrotic lymph nodes in the right hilum,  largest 2.2 cm. The right lower lobe necrotic lesion measures 1.6 x 2.1 cm and right paratracheal space mediastinal lymph nodes measuring 2.2 cm. There was also evidence of metastatic bone disease in the left 4th rib and left 10th rib fracture.      He had CT-guided biopsy of the liver lesion on 08/31/2018. He was seen by Dr. Arriaga at that time for evaluation.  However, the liver biopsy turned out to be nondiagnostic with no evidence of malignancy. So we arranged patient to have outpatient lung biopsy which was scheduled on 09/14/2018.       Nevertheless, patient was admitted to hospital again.  During this hospitalization, patient had bone survey yesterday on 09/09/2018, which reported abnormal lucencies in the skull, distal left 11th rib and proximal femoral bilaterally. Patient had bone scan this afternoon, which reported evidence of metastatic disease in the left proximal femur, left acromion with lytic lesion. There was also right distal femoral metaphysis lesion and faint uptake of the left maxilla lesion. The right manubrium is a lesion suspicious.      Patient had CT-guided right lower lobe pulmonary nodule biopsy this afternoon. Pathology results are pending.      Patient reports no headaches, no vision changes, no nausea, no vomiting. He has poor appetite. Patient reports he lost about 30 pounds in the past 4 months. He has poor appetite actually longer than that period of time. He continues to have pain in the left rib cage and also on lower extremity mostly involving the left hip area. The pain severity is 6 out of 10 currently.      Laboratory study reported patient is mildly anemic with hemoglobin 12.4, MCV 92.3, MCHC 32.4, platelets 374,000 and WBC 11,300 including neutrophils 9300, lymphocytes 1100 and monocytes 850. Chemistry lab reported creatinine 0.88. Normal electrolytes except sodium 133, glucose 122. Liver panel on 09/07/2018 was normal. He also had normal TSH and free T4. Normal PT-INR, PTT.  His urine was positive for opiates on 09/07/2018.           Medical History        Past Medical History:   Diagnosis Date   • Abnormal CT of the abdomen     • H/O Liver lesions     • H/O Lung nodule           Surgical History         Past Surgical History:   Procedure Laterality Date   • LIVER BIOPSY   08/31/2018     CT guided            HEMATOLOGIC/ONCOLOGIC HISTORY:  (History from previous dates can be found in the separate document.)     MEDICATIONS: See electronic records     ALLERGIES:   No Known Allergies     SOCIAL HISTORY:      no changes.         FAMILY HISTORY:        Family History   Problem Relation Age of Onset   • Heart attack Mother     • Heart attack Father     Patient reports no family history for malignancy.      REVIEW OF SYSTEMS:  Review of Systems   Constitutional: Positive for appetite change, fatigue and unexpected weight change (About a 30 pounds in the past 4 months). Negative for chills, diaphoresis and fever.   HENT: Negative for congestion, hearing loss, nosebleeds, sinus pressure and voice change.    Eyes: Negative for visual disturbance.   Respiratory: Positive for cough (No hemoptysis). Negative for shortness of breath.    Cardiovascular: Positive for chest pain (Left chest wall) and palpitations. Negative for leg swelling.   Gastrointestinal: Negative for abdominal pain, blood in stool, constipation, diarrhea and nausea.   Endocrine: Negative for heat intolerance.   Genitourinary: Negative for dysuria and frequency.   Musculoskeletal: Positive for arthralgias (Pain in the left hip). Negative for back pain and joint swelling.   Allergic/Immunologic: Negative for food allergies.   Neurological: Negative for dizziness, seizures and headaches.   Hematological: Negative for adenopathy. Does not bruise/bleed easily.   Psychiatric/Behavioral: Negative for agitation and confusion.               Objective   Vitals:    09/12/18 1027 09/12/18 1222 09/12/18 1429 09/12/18 1600   BP: 142/77 (!)  118/102 120/75 123/92   BP Location: Left arm Left arm Left arm    Patient Position: Lying Lying Lying    Pulse: 95 (!) 129 83 72   Resp: 16 16 16    Temp:   98.2 °F (36.8 °C)    TempSrc:   Oral    SpO2:       Weight:       Height:       ECOG 2     PHYSICAL EXAM:       CONSTITUTIONAL:  Well-developed fairly-nourished male.  No distress, looks comfortable.  EYES:  Conjunctiva and lids unremarkable.   EARS,NOSE,MOUTH,THROAT:  Ears and nose appear unremarkable.  Oral mucosa moist.  Lips appear unremarkable.  RESPIRATORY:  Normal respiratory effort.  Lungs clear to auscultation bilaterally.  CARDIOVASCULAR:  Regular rhythm and rate.   Normal S1, S2.  No murmurs.   GASTROINTESTINAL: Abdomen appears unremarkable.  Nontender.  No hepatomegaly.  No splenomegaly.  Bowel sounds normal.   LYMPHATIC:  No cervical, supraclavicular lymphadenopathy.  MUSCULOSKELETAL:  Unremarkable digits/nails.  No cyanosis or clubbing.  No significant lower extremity edema.  SKIN:  Warm.  No rashes.  PSYCHIATRIC:  Normal judgment and insight.  Normal mood and affect.        RECENT LABS:    Results from last 7 days  Lab Units 09/12/18  0431 09/11/18  0446 09/10/18  0542   WBC 10*3/mm3 10.48 11.74* 11.29*   HEMOGLOBIN g/dL 13.4* 13.0* 12.4*   HEMATOCRIT % 41.1 40.5 38.3*   PLATELETS 10*3/mm3 461 401 374     Results from last 7 days  Lab Units 09/12/18  0431 09/11/18  0446 09/10/18  0542  09/07/18  2126   SODIUM mmol/L 135* 133* 133*  < > 134*   POTASSIUM mmol/L 4.4 4.1 3.7  < > 4.0   CHLORIDE mmol/L 95* 93* 93*  < > 91*   CO2 mmol/L 23.7 26.6 27.6  < > 26.1   BUN mg/dL 23 22 23  < > 27*   CREATININE mg/dL 0.72* 0.70* 0.88  < > 0.88   CALCIUM mg/dL 9.6 10.2 10.2  < > 10.2   BILIRUBIN mg/dL  --   --   --   --  0.7   ALK PHOS U/L  --   --   --   --  112   ALT (SGPT) U/L  --   --   --   --  8   AST (SGOT) U/L  --   --   --   --  13   GLUCOSE mg/dL 144* 107* 122*  < > 121*   < > = values in this interval not displayed.     Results from last 7 days  Lab  Units 09/07/18  2126   INR  1.10   APTT seconds 33.5     Lab Results   Component Value Date    [id number] 357 09/11/2018     Lab Results   Component Value Date    FOLATE 7.34 09/11/2018     Lab Results   Component Value Date    IRON 53 (L) 09/11/2018    TIBC 264 09/11/2018    FERRITIN 430.70 (H) 09/11/2018          IMAGE STUDIES:   1. NUCLEAR MEDICINE WHOLE BODY BONE SCAN 9/10/2018     HISTORY: Fall 1 week ago injuring left rib cage. Hip pain. Suspected  lung cancer with hepatic and bony metastatic disease.     TECHNIQUE:  21.3 mCi of 99m technetium MDP was administered IV followed  by 3 hour delayed phase whole body imaging with selected spot views.     COMPARISON: CT chest 08/31/2018, CT abdomen and pelvis 08/30/2018,   bone survey 09/09/2018.     FINDINGS:  There is abnormal osseous uptake within several ribs, some of  which corresponds with bone lesions demonstrated with CT. Mild increased  uptake is present left anterior 4th rib where there is a lytic lesion on  CT. There is increased uptake right anterior 6th rib where there is a  lytic lesion. No significant increased uptake is present in the left  lateral 10th rib where CT demonstrated fracture and overlying soft  tissue thickening and suspected mass.     There is increased uptake within the right subtrochanteric region within  the left proximal femur at the level of the lesser trochanter consistent  with metastases. Abnormal uptake is present within the region of the  left acromion where there is a lytic lesion demonstrated with CT. There  is also mild increased uptake overlying the right distal femoral  metaphysis medially consistent with metastasis. There is also faint  uptake overlying the region of the left maxilla suspicious for a left  maxillary lesion. There is mild increased uptake within the right  manubrium consistent with metastasis. Both kidneys and the urinary  bladder are visualized.     IMPRESSION;  Multifocal predominantly lytic, lytic  osseous metastatic disease.     2. X-RAY BONE SURVEY COMPLETE.  9/9/2018      HISTORY: Multiple areas of bone pain suspicious lung mass.     COMPARISON: 3/30/2018.     FINDINGS:     The skull demonstrates salt-and-pepper appearance and further evaluation  is recommended. There is a 1.1 cm lucency at the inferior aspect of the  right femoral neck. Another faint lucency seen in the lateral aspect of  the left femur at the proximal diametaphyseal junction. Fracture of the  distal left 11th rib with a lucency may represent a pathological  fracture.     Spondylosis of the spine, no definite lytic lesions.     Soft tissue mass in the right lung base is redemonstrated.     Soft tissue structures are unremarkable.     IMPRESSION:  Abnormal lucencies in the skull, distal left 11th rib and proximal  femora bilaterally. Metastatic disease is suspected, given the history.   Further evaluation with bone scan is recommended.       3. CT CHEST WITH CONTRAST- 8/31/2018   CLINICAL: Evaluate for metastatic disease.     TECHNIQUE: Radiation dose reduction techniques were utilized, including  automated exposure control and exposure modulation based on body size.     FINDINGS: Located along the pleural surface of the right lower lobe  there is a necrotic 1.6 x 2.1 cm nodule. There are necrotic lymph nodes  demonstrated in the right hilum, the largest of these 2.2 cm. There are  necrotic mediastinal lymph nodes, the largest is in the right  paratracheal space measuring 2.2 cm. There is partial collapse of the  right middle lobe. There are several tiny noncalcified pulmonary nodules  noted.     The heart size is normal. No pericardial abnormality. The esophagus is  satisfactory in course and caliber. Limited images through the upper  abdomen demonstrate ill-defined hypodense hepatic nodules, both adrenal  glands are thickened however no focal adrenal mass. There is a small  nonobstructing right renal calculus.     Fracture of the left  10th rib. Subtle lytic lesion within the left 4th  rib.     CONCLUSION: There are several noncalcified pulmonary nodules, the  largest within the right lower lobe. The largest is necrotic and there  is necrotic right hilar and mediastinal lymphadenopathy. Fracture,  probably pathologic left 10th rib, lytic lesion left 4th rib.       4. CT SCANS ABDOMEN AND PELVIS WITH IV CONTRAST.  8/30/2018     HISTORY: LLQ bruising     COMPARISON: None.     TECHNIQUE: Radiation dose reduction techniques were utilized, including  automated exposure control and exposure modulation based on body size.  Axial images were obtained from the lung bases to the symphysis pubis  with IV contrast only.. Oral contrast was not administered per request.     FINDINGS ABDOMEN CT:  There are basilar pulmonary nodules including a  dominant 2 cm spiculated nodule anteriorly in the right lower lobe.  There is a 3 cm ovoid low density mass in the right infrahilar region  which is only partially imaged but likely reflects adenopathy. Some  consolidation of the lateral segment of the right middle lobe may be  related to postobstructive atelectasis. There are also multiple low  density liver lesions, largest in the left hepatic lobe measures 1.5 cm  and averages 50 Hounsfield units (solid). The other lesions are smaller  but visually appears similar in density and are suspicious for  metastatic foci. There are numerous low-density renal lesions, several  do not appear to reflect cysts. There is bilateral adrenal enlargement  with a 21 mm low density mass in the right adrenal gland suspicious for  metastatic focus. Remaining solid organs have an unremarkable  appearance.     FINDINGS PELVIS CT:  Normal appendix. The GI tract not opacified for  assessment but non obstructive in appearance. No ascites. Encompassed  aorta is non-aneurysmal. 9 mm low-density lesion in the left femoral  head anteriorly, image 127, likely small subchondral cyst. On image  46,  there is a subtle minimally displaced fracture of the left 10th rib with  some thickening of the adjacent 9/10 intercostal musculature which could  be related to hematoma. Please correlate clinically. Regional osseous  structures otherwise unremarkable on osseous windows.         IMPRESSION:  IMPRESSION :   1. Findings most consistent with metastatic neoplastic process. A  pulmonary primary would be a strong diagnostic consideration given the  pattern.  2. Low-density renal lesions, several do not reflect simple cysts.  3. Subtle nondisplaced left 10th rib fracture with adjacent muscular  thickening         5.  BRAIN MRI WITH AND WITHOUT CONTRAST on 9/10/2018      HISTORY: lung cancer, stage IV, bone mets; I48.3-Typical atrial flutter;  R91.1-Solitary pulmonary nodule     COMPARISON: None.     FINDINGS:  Multiplanar images of the head were obtained without and with  gadolinium. There are multiple enhancing intra-axial lesions bilaterally  compatible with diffuse metastases. The largest individual mass involves  the posterior right cerebellum and measures approximately 2.5 x 2.4 x  2.2 cm in greatest dimensions. There is a moderate amount of surrounding  edema which produces some mild narrowing of the fourth ventricle with  minimal leftward displacement. There is some narrowing of the overlying  cerebellar sulci.     Most of the lesions are less than 1 cm. Some exhibits some artifactual  T2 shine through on the diffusion portion of the examination. The larger  lesions, right cerebellum and lateral right thalamus are very  heterogeneous in signal intensity which may be related to hemorrhage  and/or necrosis. There is generalized atrophy. A few scattered small  foci of increased T2 and FLAIR signal abnormality likely related to  chronic ischemic gliotic changes. There is no hydrocephalus. A few  dilated perivascular spaces are present. Midline structures corpus  callosum, pituitary gland, and brainstem are  unremarkable. There is no  evidence of extra axial mass or fluid collection.     There is complete opacification of the left maxillary sinus. The globes  and orbital soft tissues are unremarkable. Mastoids are clear.           IMPRESSION:  1. Numerous enhancing intra-axial brain lesions compatible with  extensive metastatic disease. Two of the larger lesions on the right  side are very heterogeneous likely as a result of some internal  hemorrhage and/or necrosis.  2. Paranasal sinus findings as above              Assessment/Plan         1.  Patient is a 63-year-old male who has long history of cigarette smoking for 40 years and at least 1 pack a day. Patient has no previous significant medical problem until recently in late 08/2018. He was admitted for left-sided chest pain/rib pain and was found having evidence of lung cancer with metastatic disease involving the bones and liver. His CT-guided liver biopsy on 08/31/2018 was nondiagnostic. No evidence of malignancy. He did have CT-guided right lung mass biopsy early this afternoon. I reviewed all his CT scan images as well as his bone survey and his bone scan today. I think this patient will need a brain MRI examination with and without IV contrast to complete his evaluation. He is highly suspicious for metastatic brain disease also. He does not have symptom for nausea, vomiting, headache, vision changes.      I discussed with him depending on the pathology results, the lung biopsy sample may need to be further tested for genetic studies for gene mutations and gene translocations as well as PD-L1 to see if he is a candidate for targeted therapy or immunotherapy. Certainly if he has small cell lung cancer then he will need relatively quick systematic chemotherapy.      Pathology evaluation released on 9/12/2018 from the biopsy of the lung nodule come back as no malignant.  I discussed with Dr. Rossi pulmonologist and the recommended to have a rebiopsy of the lung  lesion because its easily accessible and the low risk for pneumothorax.      2.  Newly discovered diffuse metastatic brain disease.  Patient is asymptomatic.  The brain MRI shows moderate edema surrounding the largest one metastases at the right cerebellum which causes compression on the fourth ventricle.      I discussed with the patient, recommended to start dexamethasone to shrink the edema.  We'll also start him on Keppra to prevent seizure.  I recommended consult radiation oncologist, to be seen today for full brain radiation therapy.         3.  Metastatic bone disease associated with pain.  Patient is on narcotics.  Patient was given first dose Zometa on 9/10/2018.  We'll continue when necessary narcotics.     4. Anemia probably likely related to his metastatic lung cancer.  Laboratory study this morning showed no definite signs of iron deficiency, etc. fits with anemia secondary to chronic disease in this case is his malignancy.  He has normal folic acid and the low normal vitamin B12 level.  I will start him on B12 oral treatment to help with erythropoiesis.      5.  Atrial fibrillation at time of admission.  Currently it sinus rhythm.  Patient is on medications for cardiac problem.     I recommended not anticoagulate this patient until he finished a course of reading therapy to the brain because the MRI of the brain reported at possible intratumor hemorrhage.  I discussed with the patient and he voiced understanding.       PLAN:   1.   Rrebiopsy of the lung lesion with CT guidance.     2.   Continue oral dexamethasone 4 mg every 6 hours.    3.   Continue twice a day Pepcid for GI prophylaxis.   4.   Continue Keppra 500 mg twice a day for primary prophylaxis of seizure.   5.   Continue vitamin B12 1000 µg daily oral treatment.   6.   Patient has appointment with Dr. Arriaga on 9/25/2018    Appreciated radiation oncology Dr. Hawkins's consultation.      I discussed that with the pulmonologist Dr. Rossi for  rebiopsy of the lung lesion.        JOB HWANG M.D., Ph.D.    9/12/2018          Dictated using Dragon Dictation.

## 2018-09-12 NOTE — PLAN OF CARE
Problem: Patient Care Overview  Goal: Plan of Care Review  Outcome: Ongoing (interventions implemented as appropriate)   09/12/18 3731   Coping/Psychosocial   Plan of Care Reviewed With patient   Plan of Care Review   Progress no change   OTHER   Outcome Summary pt pain relieved with 2 norco at rest but with activity does not keep pain away- Dr sampson aware- dilaudid added prn- pt able to ambulate in [place] with brother after dilaudid given, heart rate 's pt denies c/o with- toprol 12.5 added - Dr Zheng will assess pt response to med tomorrow, for CT guided lung biopsy tomorrow, safety maintained, continue to monitor

## 2018-09-12 NOTE — SIGNIFICANT NOTE
09/12/18 1552   Rehab Time/Intention   Evaluation Not Performed patient/family declined evaluation  (Pt denies need for OT.  States independent with adls and has been up on own. will not follow for OT)   Rehab Treatment   Discipline occupational therapist

## 2018-09-12 NOTE — SIGNIFICANT NOTE
09/12/18 1340   Rehab Time/Intention   Evaluation Not Performed other (see comments)  (Pt up ad heriberto in hallways, walking independent with no AD. No noted balance deficits. Pt denies need for acute skilled PT at this time. PT to sign off.)   Rehab Treatment   Discipline physical therapist

## 2018-09-12 NOTE — PROGRESS NOTES
"DAILY PROGRESS NOTE  [facility]    Patient Identification:  Name: Beltran Leo  Age: 63 y.o.  Sex: male  :  1955  MRN: [id number]         Primary Care Physician: Provider, No Known    Subjective:  Interval History:He complains of pain. Hurting all over.  He is confused.    Objective:    Scheduled Meds:    dexamethasone 4 mg Oral Q6H   diltiaZEM  mg Oral Q24H   famotidine 20 mg Oral BID   insulin aspart 0-9 Units Subcutaneous 4x Daily With Meals & Nightly   levETIRAcetam 500 mg Oral Q12H   nicotine 1 patch Transdermal Q24H   pantoprazole 40 mg Oral Q AM   vitamin B-12 1,000 mcg Oral Daily     Continuous Infusions:    sodium chloride 100 mL/hr Last Rate: 100 mL/hr (18 0130)       Vital signs in last 24 hours:  Temp:  [98.3 °F (36.8 °C)-99.4 °F (37.4 °C)] 98.8 °F (37.1 °C)  Heart Rate:  [] 129  Resp:  [16-20] 16  BP: (115-142)/() 118/102    Intake/Output:    Intake/Output Summary (Last 24 hours) at 18 1234  Last data filed at 18 1821   Gross per 24 hour   Intake              310 ml   Output              700 ml   Net             -390 ml       Exam:  BP (!) 118/102 (BP Location: Left arm, Patient Position: Lying)   Pulse (!) 129   Temp 98.8 °F (37.1 °C) (Oral)   Resp 16   Ht 167.6 cm (66\")   Wt 65.3 kg (143 lb 15.4 oz)   SpO2 97%   BMI 23.24 kg/m²     General Appearance:    Alert, cooperative, no distress   Head:    Normocephalic, without obvious abnormality, atraumatic   Eyes:       Throat:   Lips, tongue, gums normal   Neck:   Supple, symmetrical, trachea midline, no JVD   Lungs:     Clear to auscultation bilaterally, respirations unlabored   Chest Wall:    No tenderness or deformity    Heart:    Regular rate and rhythm, S1 and S2 normal, no murmur,no  Rub or gallop   Abdomen:     Soft, non-tender, bowel sounds active, no masses, no organomegaly    Extremities:   Extremities normal, atraumatic, no cyanosis or edema   Pulses:      Skin:   Skin is " "warm and dry,  no rashes or palpable lesions   Neurologic:   no focal deficits noted      Lab Results (last 72 hours)     Procedure Component Value Units Date/Time    Tissue Pathology Exam [[id number]] Collected:  09/10/18 1326    Specimen:  Tissue from Lung, Right Lower Lobe Updated:  09/11/18 1513     Case Report --     Surgical Pathology Report                         Case: [id number]                                  Authorizing Provider:  Padma Rossi MD         Collected:           09/10/2018 01:26 PM          Ordering Location:     [facility]  Received:            09/10/2018 02:52 PM                                 [address]                                                                      Pathologist:           Manny Le MD                                                                           Specimen:    Lung, Right Lower Lobe, Right lower lobe mass                                               Clinical Information --     Foundation CDX rather than lung reflex per Dr. Madison Lombardo.       Final Diagnosis --     RIGHT LOWER LOBE MASS BIOPSY:        BENIGN PULMONARY PARENCHYMA WITH FOCAL MILD CHRONIC INFLAMMATION, HEMORRHAGE.        BLOOD CLOT.        TUMOR AND GRANULOMA ARE NOT IDENTIFIED.            COMMENT: Multiple levels deep in the block have been examined microscopically.    CMK/  IHC/TDJ    CPT CODES:  1. 84137       Gross Description --     Received in formalin labeled \"CT guided biopsy, right lower lung mass\" are 2 tan to hemorrhagic needle core biopsies up to 1.1 cm long and less than 0.1 cm across.  The tissue is entirely submitted in a single block labeled 1A.   CC/USO/TDJ/brb       Microscopic Description --     Performed, incorporated in diagnosis.         Vitamin B12 [[id number]]  (Normal) Collected:  09/11/18 0446    Specimen:  Blood Updated:  09/11/18 0644     Vitamin B-12 357 pg/mL "     Folate [[id number]]  (Normal) Collected:  09/11/18 0446    Specimen:  Blood Updated:  09/11/18 0644     Folate 7.34 ng/mL     Ferritin [[id number]]  (Abnormal) Collected:  09/11/18 0446    Specimen:  Blood Updated:  09/11/18 0634     Ferritin 430.70 (H) ng/mL     Basic Metabolic Panel [[id number]]  (Abnormal) Collected:  09/11/18 0446    Specimen:  Blood Updated:  09/11/18 0624     Glucose 107 (H) mg/dL      BUN 22 mg/dL      Creatinine 0.70 (L) mg/dL      Sodium 133 (L) mmol/L      Potassium 4.1 mmol/L      Chloride 93 (L) mmol/L      CO2 26.6 mmol/L      Calcium 10.2 mg/dL      eGFR Non African Amer 114 mL/min/1.73      BUN/Creatinine Ratio 31.4 (H)     Anion Gap 13.4 mmol/L     Narrative:       GFR Normal >60  Chronic Kidney Disease <60  Kidney Failure <15    Iron Profile [[id number]]  (Abnormal) Collected:  09/11/18 0446    Specimen:  Blood Updated:  09/11/18 0624     Iron 53 (L) mcg/dL      Iron Saturation 20 %      Transferrin 177 (L) mg/dL      TIBC 264 mcg/dL     CBC & Differential [[id number]] Collected:  09/11/18 0446    Specimen:  Blood Updated:  09/11/18 0602    Narrative:       The following orders were created for panel order CBC & Differential.  Procedure                               Abnormality         Status                     ---------                               -----------         ------                     CBC Auto Differential[[id number]]        Abnormal            Final result                 Please view results for these tests on the individual orders.    CBC Auto Differential [[id number]]  (Abnormal) Collected:  09/11/18 0446    Specimen:  Blood Updated:  09/11/18 0602     WBC 11.74 (H) 10*3/mm3      RBC 4.31 (L) 10*6/mm3      Hemoglobin 13.0 (L) g/dL      Hematocrit 40.5 %      MCV 94.0 fL      MCH 30.2 pg      MCHC 32.1 (L) g/dL      RDW 11.8 %      RDW-SD 39.8 fl      MPV 11.2 fL      Platelets 401 10*3/mm3      Neutrophil % 82.2 (H) %      Lymphocyte % 10.1 (L) %      Monocyte % 7.1 %       Eosinophil % 0.2 (L) %      Basophil % 0.1 %      Immature Grans % 0.3 %      Neutrophils, Absolute 9.66 (H) 10*3/mm3      Lymphocytes, Absolute 1.19 10*3/mm3      Monocytes, Absolute 0.83 10*3/mm3      Eosinophils, Absolute 0.02 10*3/mm3      Basophils, Absolute 0.01 10*3/mm3      Immature Grans, Absolute 0.03 10*3/mm3     Osmolality, Urine - Urine, Clean Catch [[id number]] Collected:  09/10/18 1944    Specimen:  Urine from Urine, Clean Catch Updated:  09/10/18 2101     Osmolality, Urine 783 mOsm/kg     Narrative:       Osmo Normal Reference Ranges:    Random:  mOsm/kg H2O, depending on fluid intake.  Random: >850 mOsm/kg H20, after 12 hour fluid restriction.    24 Hour: 300-900 mOsm/kg H2O.    Sodium, Urine, Random - Urine, Clean Catch [[id number]] Collected:  09/10/18 1944    Specimen:  Urine from Urine, Clean Catch Updated:  09/10/18 2044     Sodium, Urine 31 mmol/L     Narrative:       Reference intervals for random urine have not been established.  Clinical usage is dependent upon physician's interpretation in combination with other laboratory tests.     Basic Metabolic Panel [[id number]]  (Abnormal) Collected:  09/10/18 0542    Specimen:  Blood Updated:  09/10/18 0632     Glucose 122 (H) mg/dL      BUN 23 mg/dL      Creatinine 0.88 mg/dL      Sodium 133 (L) mmol/L      Potassium 3.7 mmol/L      Chloride 93 (L) mmol/L      CO2 27.6 mmol/L      Calcium 10.2 mg/dL      eGFR Non African Amer 87 mL/min/1.73      BUN/Creatinine Ratio 26.1 (H)     Anion Gap 12.4 mmol/L     Narrative:       GFR Normal >60  Chronic Kidney Disease <60  Kidney Failure <15    CBC & Differential [[id number]] Collected:  09/10/18 0542    Specimen:  Blood Updated:  09/10/18 0620    Narrative:       The following orders were created for panel order CBC & Differential.  Procedure                               Abnormality         Status                     ---------                               -----------         ------                      CBC Auto Differential[[id number]]        Abnormal            Final result                 Please view results for these tests on the individual orders.    CBC Auto Differential [[id number]]  (Abnormal) Collected:  09/10/18 0542    Specimen:  Blood Updated:  09/10/18 0620     WBC 11.29 (H) 10*3/mm3      RBC 4.15 (L) 10*6/mm3      Hemoglobin 12.4 (L) g/dL      Hematocrit 38.3 (L) %      MCV 92.3 fL      MCH 29.9 pg      MCHC 32.4 (L) g/dL      RDW 11.6 %      RDW-SD 39.6 fl      MPV 10.9 fL      Platelets 374 10*3/mm3      Neutrophil % 82.3 (H) %      Lymphocyte % 9.9 (L) %      Monocyte % 7.5 %      Eosinophil % 0.1 (L) %      Basophil % 0.0 %      Immature Grans % 0.2 %      Neutrophils, Absolute 9.29 (H) 10*3/mm3      Lymphocytes, Absolute 1.12 10*3/mm3      Monocytes, Absolute 0.85 10*3/mm3      Eosinophils, Absolute 0.01 10*3/mm3      Basophils, Absolute 0.00 10*3/mm3      Immature Grans, Absolute 0.02 10*3/mm3     Basic Metabolic Panel [[id number]]  (Abnormal) Collected:  09/09/18 1948    Specimen:  Blood Updated:  09/09/18 2019     Glucose 134 (H) mg/dL      BUN 27 (H) mg/dL      Creatinine 0.91 mg/dL      Sodium 132 (L) mmol/L      Potassium 3.9 mmol/L      Chloride 93 (L) mmol/L      CO2 26.6 mmol/L      Calcium 9.7 mg/dL      eGFR Non African Amer 84 mL/min/1.73      BUN/Creatinine Ratio 29.7 (H)     Anion Gap 12.4 mmol/L     Narrative:       GFR Normal >60  Chronic Kidney Disease <60  Kidney Failure <15        Data Review:    Results from last 7 days  Lab Units 09/12/18  0431 09/11/18  0446 09/10/18  0542   SODIUM mmol/L 135* 133* 133*   POTASSIUM mmol/L 4.4 4.1 3.7   CHLORIDE mmol/L 95* 93* 93*   CO2 mmol/L 23.7 26.6 27.6   BUN mg/dL 23 22 23   CREATININE mg/dL 0.72* 0.70* 0.88   GLUCOSE mg/dL 144* 107* 122*   CALCIUM mg/dL 9.6 10.2 10.2       Results from last 7 days  Lab Units 09/12/18  0431 09/11/18  0446 09/10/18  0542   WBC 10*3/mm3 10.48 11.74* 11.29*   HEMOGLOBIN g/dL 13.4* 13.0* 12.4*    HEMATOCRIT % 41.1 40.5 38.3*   PLATELETS 10*3/mm3 461 401 374       Results from last 7 days  Lab Units 09/07/18 2126   TSH mIU/mL 3.130   FREE T4 ng/dL 1.43           Lab Results  Lab Value Date/Time   TROPONINT <0.010 09/07/2018 2126   TROPONINT <0.010 08/30/2018 2052           Results from last 7 days  Lab Units 09/07/18  2126   ALK PHOS U/L 112   BILIRUBIN mg/dL 0.7   ALT (SGPT) U/L 8   AST (SGOT) U/L 13       Results from last 7 days  Lab Units 09/07/18 2126   TSH mIU/mL 3.130   FREE T4 ng/dL 1.43         Glucose   Date/Time Value Ref Range Status   09/12/2018 1129 139 (H) 70 - 130 mg/dL Final   09/12/2018 0758 143 (H) 70 - 130 mg/dL Final   09/12/2018 0546 152 (H) 70 - 130 mg/dL Final   09/11/2018 2012 134 (H) 70 - 130 mg/dL Final   09/11/2018 1623 142 (H) 70 - 130 mg/dL Final       Results from last 7 days  Lab Units 09/07/18 2126   INR  1.10       Patient Active Problem List   Diagnosis Code   • Hematuria R31.9   • Closed fracture of one rib S22.39XA   • Lung mass R91.8   • Abnormal CT of the abdomen R93.5   • Left hip pain M25.552   • Typical atrial flutter (CMS/HCC) I48.3   • Left rib fracture S22.32XA   • Wheezes R06.2   • Hyponatremia E87.1   • Atrial flutter with rapid ventricular response (CMS/HCC) I48.92   • Metastasis to bone (CMS/HCC) C79.51   • Secondary cancer of brain (CMS/HCC) C79.31   • Anemia of chronic disease D63.8       Assessment:  Active Hospital Problems    Diagnosis Date Noted   • **Typical atrial flutter (CMS/HCC) [I48.3] 09/07/2018   • Secondary cancer of brain (CMS/HCC) [C79.31] 09/11/2018   • Anemia of chronic disease [D63.8] 09/11/2018   • Atrial flutter with rapid ventricular response (CMS/HCC) [I48.92] 09/10/2018   • Metastasis to bone (CMS/HCC) [C79.51] 09/10/2018   • Hyponatremia [E87.1] 09/09/2018   • Left rib fracture [S22.32XA] 09/08/2018   • Wheezes [R06.2] 09/08/2018   • Lung mass [R91.8] 08/31/2018      Resolved Hospital Problems    Diagnosis Date Noted Date Resolved    No resolved problems to display.       Plan:  Continue current RX and rate control. XRT and outpatient follow up being set up. DC planning?????    Leobardo Babin MD  9/12/2018  12:34 PM

## 2018-09-13 NOTE — PROGRESS NOTES
Name: Beltran Leo ADMIT: 2018   : 1955  PCP: Provider, No Known    MRN: [id number] LOS: 6 days   AGE/SEX: 63 y.o. male  ROOM: [facility]/   Subjective   Had lung biopsy. Pain at site with movement. No change in dyspnea or cough. No CP NVD.    Objective   Vital Signs  Temp:  [97.7 °F (36.5 °C)-98.8 °F (37.1 °C)] 97.7 °F (36.5 °C)  Heart Rate:  [] 72  Resp:  [16-18] 16  BP: (118-147)/() 139/87  SpO2:  [95 %-98 %] 96 %  on   ;   Device (Oxygen Therapy): room air  Body mass index is 23.24 kg/m².    Physical Exam   Constitutional: He appears well-developed.   HENT:   Head: Normocephalic and atraumatic.   Eyes: Conjunctivae and EOM are normal.   Neck: Normal range of motion. Neck supple.   Cardiovascular: Normal rate, regular rhythm and intact distal pulses.    Pulmonary/Chest: Effort normal. He has no wheezes. He has no rales.   Abdominal: Soft. He exhibits no distension.   Musculoskeletal: Normal range of motion. He exhibits no edema.   Neurological: He is alert. No cranial nerve deficit.   Skin: Skin is warm and dry. He is not diaphoretic.   Psychiatric: He has a normal mood and affect. His behavior is normal.   Nursing note and vitals reviewed.      Results Review:       I reviewed the patient's new clinical results.     I reviewed imaging, agree with interpretation.     I reviewed telemetry/EKG results, sinus rhythm.        Results from last 7 days  Lab Units 18  0435 18  0431 18  0446 09/10/18  0542   WBC 10*3/mm3 15.09* 10.48 11.74* 11.29*   HEMOGLOBIN g/dL 12.6* 13.4* 13.0* 12.4*   PLATELETS 10*3/mm3 492 461 401 374     Results from last 7 days  Lab Units 18  0435 18  0431 18  0446 09/10/18  0542   SODIUM mmol/L 133* 135* 133* 133*   POTASSIUM mmol/L 4.7 4.4 4.1 3.7   CHLORIDE mmol/L 96* 95* 93* 93*   CO2 mmol/L 25.5 23.7 26.6 27.6   BUN mg/dL 26* 23 22 23   CREATININE mg/dL 0.72* 0.72* 0.70* 0.88   GLUCOSE mg/dL 131* 144* 107* 122*   Estimated  Creatinine Clearance: 97 mL/min (A) (by C-G formula based on SCr of 0.72 mg/dL (L)).  Results from last 7 days  Lab Units 09/13/18  0435 09/12/18  0431 09/11/18  0446 09/10/18  0542  09/07/18  2126   CALCIUM mg/dL 8.9 9.6 10.2 10.2  < > 10.2   ALBUMIN g/dL  --   --   --   --   --  4.20   MAGNESIUM mg/dL  --   --   --   --   --  2.1   < > = values in this interval not displayed.      dexamethasone 4 mg Oral Q6H   diltiaZEM  mg Oral Q24H   famotidine 20 mg Oral BID   insulin aspart 0-9 Units Subcutaneous 4x Daily With Meals & Nightly   levETIRAcetam 500 mg Oral Q12H   lidocaine 10 mL Intradermal Once   metoprolol succinate XL 12.5 mg Oral Q24H   nicotine 1 patch Transdermal Q24H   pantoprazole 40 mg Oral Q AM   vitamin B-12 1,000 mcg Oral Daily       sodium chloride 100 mL/hr Last Rate: 100 mL/hr (09/13/18 0854)   Diet Regular; Cardiac      Assessment/Plan      Active Hospital Problems    Diagnosis Date Noted   • **Typical atrial flutter (CMS/HCC) [I48.3] 09/07/2018   • Secondary cancer of brain (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 Initial Bx was negative so repeat CT guided Bx was done today. Reviewed imaging. Continue decadron and keppra. Pulmonology, Med Onc, Rad Onc are following.  - Atrial Flutter: Now on Diltiazem and Metoprolol. Currently sinus rhythm. Cardiology evaluated.  - Hyponatremia: Mild and unchanged. Will monitor.  - Depressed Mood: Access consulted.  - Disposition: Home/TBD    >35 minutes time  spent    Eduardo Cates MD  [facility]  09/13/18  11:26 AM

## 2018-09-13 NOTE — PLAN OF CARE
Problem: Patient Care Overview  Goal: Plan of Care Review  Outcome: Ongoing (interventions implemented as appropriate)   09/13/18 2158   Coping/Psychosocial   Plan of Care Reviewed With patient   Plan of Care Review   Progress no change   OTHER   Outcome Summary Pt A&Ox4, VSS, c/o pain but PO meds seem to control it well. Pt has moments of confusion/forgetfulness--it's almost like he doesn't understand his diagnosis/prognosis. Pt was possibly going to be D/C'd home today, but since the family didn't have a plan of care in place, it wasn't safe to send the patient home alone. Pt's brother, sister and ex-wife are putting their heads together this evening so they have a plan in effect for D/C tomorrow. CT biopsy this AM, awaiting results. Will continue to monitor.

## 2018-09-13 NOTE — PROGRESS NOTES
Continued Stay Note  [facility]     Patient Name: Beltran Leo  MRN: [id number]  Today's Date: 9/13/2018    Admit Date: 9/7/2018          Discharge Plan     Row Name 09/13/18 1351       Plan    Plan Home with family    Plan Comments Kylee primary nurse spoke with brother and the family will get together and make plan for discharge for pt.  Spoke to patient and attepmted to guide him with making decisions for home.  He states he wants to retire and that he can  jobs when he gets feeling better.  CCP questioned patient about his diagnosis and prognosis.  He states he understands his projected outcome and he states he does.  He says his former spouse will come in to help take care of him as she works part time.  CCP following for family decision on DC plan              Discharge Codes    No documentation.           Jessi Bender RN

## 2018-09-13 NOTE — PROGRESS NOTES
Patient brought over for treatment planning and we are ready to start whole brain treatment as soon as pathology finalized. Reviewed second non-diagnostic path and plans for repeat lung biopsy. Will continue to watch and begin treatments as soon as malignancy determined.    We will continue to discuss with patient and family logistics of treatment and transportation when he is ready for discharge.  We are planning 10 treatments to the brain currently. We also discussed possible palliative treatment to bony lesions as needed thereafter and we will discuss that further once underway.

## 2018-09-13 NOTE — PLAN OF CARE
Problem: Nutrition, Imbalanced: Inadequate Oral Intake (Adult)  Intervention: Promote/Optimize Nutrition   09/13/18 1402   Nutrition Interventions   Oral Nutrition Promotion nutritional therapy counseling provided   Follow up-patient consuming minimal. Encouraged adequate po intake for energy and healing through treatment  He needs encouragement with meals. RD to continue to follow per protocol

## 2018-09-13 NOTE — PROGRESS NOTES
Subjective     .      REASON FOR CONSULTATION:     1.  Provide an opinion on any further workup or treatment of metastatic lung cancer, bones, possible liver.   2.  Diffuse metastatic bone disease, Zometa 1 dose was given on 9/10/2018.  This will be repeated in every 4 weeks.    3.  Liver biopsy on 8/31/2018 was nondiagnostic.  Lung biopsy on 9/10/2018 with pathology results pending.      INTERVAL HISTORY:   Patient was given first dose of Zometa in the evening of 9/10/2018 for his metastatic bone disease associated with pain.     Brain MRI in the night on 9/10/2018 reported diffuse metastatic disease, most significant in the right cerebellum and surrounding edema.           On 9/12/2018, pathology evaluation reported no malignancy from lung biopsy.     In the morning of 9/13/2018, patient had CT-guided biopsy of the right lung mass again.  Patient reports no short of breath after procedure.  He is some ache at the site of biopsy.     HISTORY OF PRESENT ILLNESS:  The patient is a 63 y.o. year old male who was admitted this time on 09/07/2018 because of sudden onset atrial flutter with palpitation. This patient was evaluated in the ER with heart rate in the 170s. He was given adenosine and metoprolol and then started on diltiazem drip. He was converted to normal sinus rhythm.      This patient was recently hospitalized in the end of 08/2018 because of left-sided rib pain. He was found having evidence of metastatic disease likely lung cancer with metastasis to the liver and bones.     He originally had CT scan examination on 08/30/2018 for the abnormal pelvis for evaluation of left hip pain/left flank and lower chest wall pain. That study reported multiple pulmonary nodules with dominant 2 cm spiculated nodule in the right lower lobe. There was also 3 cm mass in the right infrahilar region. There were multiple low-density liver lesions with largest one measuring 1.5 cm in the left hepatic lobe. There was also  bilateral adrenal enlargement with 21 mm low-density mass in the right side suspicious for metastatic disease. CT examination on 08/31/2018 reported necrotic lymph nodes in the right hilum, largest 2.2 cm. The right lower lobe necrotic lesion measures 1.6 x 2.1 cm and right paratracheal space mediastinal lymph nodes measuring 2.2 cm. There was also evidence of metastatic bone disease in the left 4th rib and left 10th rib fracture.      He had CT-guided biopsy of the liver lesion on 08/31/2018. He was seen by Dr. Arriaga at that time for evaluation.  However, the liver biopsy turned out to be nondiagnostic with no evidence of malignancy. So we arranged patient to have outpatient lung biopsy which was scheduled on 09/14/2018.       Nevertheless, patient was admitted to hospital again.  During this hospitalization, patient had bone survey yesterday on 09/09/2018, which reported abnormal lucencies in the skull, distal left 11th rib and proximal femoral bilaterally. Patient had bone scan this afternoon, which reported evidence of metastatic disease in the left proximal femur, left acromion with lytic lesion. There was also right distal femoral metaphysis lesion and faint uptake of the left maxilla lesion. The right manubrium is a lesion suspicious.      Patient had CT-guided right lower lobe pulmonary nodule biopsy this afternoon. Pathology results are pending.      Patient reports no headaches, no vision changes, no nausea, no vomiting. He has poor appetite. Patient reports he lost about 30 pounds in the past 4 months. He has poor appetite actually longer than that period of time. He continues to have pain in the left rib cage and also on lower extremity mostly involving the left hip area. The pain severity is 6 out of 10 currently.      Laboratory study reported patient is mildly anemic with hemoglobin 12.4, MCV 92.3, MCHC 32.4, platelets 374,000 and WBC 11,300 including neutrophils 9300, lymphocytes 1100 and monocytes  850. Chemistry lab reported creatinine 0.88. Normal electrolytes except sodium 133, glucose 122. Liver panel on 09/07/2018 was normal. He also had normal TSH and free T4. Normal PT-INR, PTT. His urine was positive for opiates on 09/07/2018.           Medical History        Past Medical History:   Diagnosis Date   • Abnormal CT of the abdomen     • H/O Liver lesions     • H/O Lung nodule           Surgical History         Past Surgical History:   Procedure Laterality Date   • LIVER BIOPSY   08/31/2018     CT guided            HEMATOLOGIC/ONCOLOGIC HISTORY:  (History from previous dates can be found in the separate document.)     MEDICATIONS: See electronic records     ALLERGIES:   No Known Allergies     SOCIAL HISTORY:      no changes.         FAMILY HISTORY:        Family History   Problem Relation Age of Onset   • Heart attack Mother     • Heart attack Father     Patient reports no family history for malignancy.      REVIEW OF SYSTEMS:  Review of Systems   Constitutional: Positive for appetite change, fatigue and unexpected weight change (About a 30 pounds in the past 4 months). Negative for chills, diaphoresis and fever.   HENT: Negative for congestion, hearing loss, nosebleeds, sinus pressure and voice change.    Eyes: Negative for visual disturbance.   Respiratory: Positive for cough (No hemoptysis). Negative for shortness of breath.    Cardiovascular: Positive for chest pain (Left chest wall). Negative for leg swelling.   Gastrointestinal: Negative for abdominal pain, blood in stool, constipation, diarrhea and nausea.   Endocrine: Negative for heat intolerance.   Genitourinary: Negative for dysuria and frequency.   Musculoskeletal: Positive for arthralgias (Pain in the left hip). Negative for back pain and joint swelling.   Allergic/Immunologic: Negative for food allergies.   Neurological: Negative for dizziness, seizures and headaches.   Hematological: Negative for adenopathy. Does not bruise/bleed easily.    Psychiatric/Behavioral: Negative for agitation and confusion.               Objective   Vitals:    09/12/18 2318 09/13/18 0731 09/13/18 0925 09/13/18 1028   BP: 133/87 139/81 140/86 139/87   BP Location: Left arm Right arm Right arm Right arm   Patient Position: Lying Lying Lying Lying   Pulse: 73 69 72 72   Resp: 18 18 18 16   Temp: 98.8 °F (37.1 °C) 97.7 °F (36.5 °C)     TempSrc: Oral Oral     SpO2:  95% 98% 96%   Weight:       Height:       ECOG 2     PHYSICAL EXAM:       CONSTITUTIONAL:  Well-developed fairly-nourished male.  No distress, looks comfortable.  EYES:  Conjunctiva and lids unremarkable.   EARS,NOSE,MOUTH,THROAT:   Oral mucosa moist.  Lips appear unremarkable.  RESPIRATORY:  Normal respiratory effort.  Lungs clear to auscultation bilaterally.  Right chest wall bandage in place at the site of the biopsy.  CARDIOVASCULAR:  Regular rhythm and rate.   Normal S1, S2.  No murmurs.   GASTROINTESTINAL: Abdomen appears unremarkable.  Nontender.  No hepatomegaly.  No splenomegaly.  Bowel sounds normal.   LYMPHATIC:  No cervical, supraclavicular lymphadenopathy.  MUSCULOSKELETAL:  Unremarkable digits/nails.  No cyanosis or clubbing.  No significant lower extremity edema.  SKIN:  Warm.  No rashes.  PSYCHIATRIC:  Normal judgment and insight.  Normal mood and affect.        RECENT LABS:    Results from last 7 days  Lab Units 09/13/18  0435 09/12/18  0431 09/11/18  0446   WBC 10*3/mm3 15.09* 10.48 11.74*   HEMOGLOBIN g/dL 12.6* 13.4* 13.0*   HEMATOCRIT % 37.7* 41.1 40.5   PLATELETS 10*3/mm3 492 461 401     Results from last 7 days  Lab Units 09/13/18  0435 09/12/18  0431 09/11/18  0446  09/07/18  2126   SODIUM mmol/L 133* 135* 133*  < > 134*   POTASSIUM mmol/L 4.7 4.4 4.1  < > 4.0   CHLORIDE mmol/L 96* 95* 93*  < > 91*   CO2 mmol/L 25.5 23.7 26.6  < > 26.1   BUN mg/dL 26* 23 22  < > 27*   CREATININE mg/dL 0.72* 0.72* 0.70*  < > 0.88   CALCIUM mg/dL 8.9 9.6 10.2  < > 10.2   BILIRUBIN mg/dL  --   --   --   --  0.7    ALK PHOS U/L  --   --   --   --  112   ALT (SGPT) U/L  --   --   --   --  8   AST (SGOT) U/L  --   --   --   --  13   GLUCOSE mg/dL 131* 144* 107*  < > 121*   < > = values in this interval not displayed.     Results from last 7 days  Lab Units 09/13/18  0704 09/07/18  2126   INR  1.07 1.10   APTT seconds 24.9 33.5     Lab Results   Component Value Date    EXBVXAWC60 357 09/11/2018     Lab Results   Component Value Date    FOLATE 7.34 09/11/2018     Lab Results   Component Value Date    IRON 53 (L) 09/11/2018    TIBC 264 09/11/2018    FERRITIN 430.70 (H) 09/11/2018          IMAGE STUDIES:      CT-GUIDED BIOPSY OF PLEURAL-BASED MASS IN RIGHT LUNG 9/13/2018     HISTORY: Suspicious mass in right lung.     Following sterile prep and local anesthetic, a 17-gauge needle was  advanced to the margin of the lesion by Dr. Carranza. 8 core specimens  were then obtained with an 18-gauge needle. 2 of the specimens were  placed in special medial for flow cytometry. The remaining specimens  were placed in formalin.     The postbiopsy CT scan shows no complicating features and the patient  was returned to triage in stable condition for observation.     IMPRESSION:  1. Numerous enhancing intra-axial brain lesions compatible with  extensive metastatic disease. Two of the larger lesions on the right  side are very heterogeneous likely as a result of some internal  hemorrhage and/or necrosis.  2. Paranasal sinus findings as above              Assessment/Plan         1.  Patient is a 63-year-old male who has long history of cigarette smoking for 40 years and at least 1 pack a day. Patient has no previous significant medical problem until recently in late 08/2018. He was admitted for left-sided chest pain/rib pain and was found having evidence of lung cancer with metastatic disease involving the bones and liver. His CT-guided liver biopsy on 08/31/2018 was nondiagnostic. No evidence of malignancy. He did have CT-guided right lung mass  biopsy early this afternoon. I reviewed all his CT scan images as well as his bone survey and his bone scan today. I think this patient will need a brain MRI examination with and without IV contrast to complete his evaluation. He is highly suspicious for metastatic brain disease also. He does not have symptom for nausea, vomiting, headache, vision changes.      I discussed with him depending on the pathology results, the lung biopsy sample may need to be further tested for genetic studies for gene mutations and gene translocations as well as PD-L1 to see if he is a candidate for targeted therapy or immunotherapy. Certainly if he has small cell lung cancer then he will need relatively quick systematic chemotherapy.      Pathology evaluation released on 9/12/2018 from the biopsy of the lung nodule come back as no malignant.  I discussed with Dr. Rossi pulmonologist and the recommended to have a rebiopsy of the lung lesion because its easily accessible and the low risk for pneumothorax.      Patient had a repeated CT-guided right lung nodule biopsy on 9/13/2018.    2.  Newly discovered diffuse metastatic brain disease.  Patient is asymptomatic.  The brain MRI shows moderate edema surrounding the largest one metastases at the right cerebellum which causes compression on the fourth ventricle.      I discussed with the patient, recommended to start dexamethasone to shrink the edema.  We'll also start him on Keppra to prevent seizure.  I recommended consult radiation oncologist, to be seen today for full brain radiation therapy.         3.  Metastatic bone disease associated with pain.  Patient is on narcotics.  Patient was given first dose Zometa on 9/10/2018.  We'll continue when necessary narcotics.     4. Anemia probably likely related to his metastatic lung cancer.  Laboratory study this morning showed no definite signs of iron deficiency, etc. fits with anemia secondary to chronic disease in this case is his  malignancy.  He has normal folic acid and the low normal vitamin B12 level.  I will start him on B12 oral treatment to help with erythropoiesis.      5.  Atrial fibrillation at time of admission.  Currently it sinus rhythm.  Patient is on medications for cardiac problem.     I recommended not anticoagulate this patient until he finished a course of reading therapy to the brain because the MRI of the brain reported at possible intratumor hemorrhage.  I discussed with the patient and he voiced understanding.       PLAN:   1.   Continue oral dexamethasone 4 mg every 6 hours.    2.   Continue twice a day Pepcid for GI prophylaxis.   3.   Continue Keppra 500 mg twice a day for primary prophylaxis of seizure.   4.   Continue vitamin B12 at 1000 µg daily oral treatment.   5.   Patient has appointment with Dr. Arriaga on 9/25/2018.    I think this patient should not drive by himself due to risk of seizure because of brain metastases.      I spoke with patient today.  I spoke with his nurse Marion today.      JOB HWANG M.D., Ph.D.    9/13/2018          Dictated using Dragon Dictation.

## 2018-09-13 NOTE — PROGRESS NOTES
Continued Stay Note  [facility]     Patient Name: Beltran Leo  MRN: [id number]  Today's Date: 9/13/2018    Admit Date: 9/7/2018          Discharge Plan     Row Name 09/13/18 0914       Plan    Plan Home    Plan Comments Spoke with patient about DC plan  He states his former spouse will help him if needed when he is discharged.  He states he has an understanding of his illness.  He has [facility] paper work to fill out  placed on pt chart for physician office tyo complete  brother aware $50.00 to fill out paper work and that must be paid in advance  CCP finding physician for out patient care  No other needs at this time.              Discharge Codes    No documentation.           Jessi Bender RN

## 2018-09-13 NOTE — PROGRESS NOTES
Hospital Follow Up    LOS:  LOS: 6 days   Patient Name: Beltran Leo  Age/Sex: 63 y.o. male  : 1955  MRN: [id number]    Day of Service: 18   Length of Stay: 6  Encounter Provider: Simone Cheema MD  Place of Service: [facility] CARDIOLOGY  Patient Care Team:  Provider, No Known as PCP - Eliot Wray MD as Consulting Physician (Hematology and Oncology)  Jerica Hou MD as Referring Physician (Internal Medicine)    Subjective:     Chief Complaint: Paroxysmal Atrial fibrillation    Interval History: Patient with metastatic lung cancer.  Patient went back in atrial fibrillation yesterday.  I added back a low-dose beta blocker and patient remains in sinus rhythm this morning.    Objective:     Objective:  Temp:  [97.7 °F (36.5 °C)-98.8 °F (37.1 °C)] 97.7 °F (36.5 °C)  Heart Rate:  [] 69  Resp:  [16-18] 18  BP: (118-147)/() 139/81     Intake/Output Summary (Last 24 hours) at 18 0845  Last data filed at 18 1310   Gross per 24 hour   Intake              900 ml   Output                0 ml   Net              900 ml     Body mass index is 23.24 kg/m².  1    18  0100 18  0101   Weight: 70.9 kg (156 lb 3.2 oz) 65.3 kg (143 lb 15.4 oz)     Weight change:       Physical Exam:   General : Alert, cooperative, in no acute distress.  Neuro: alert,cooperative and oriented  Lungs: CTAB. Normal respiratory effort and rate.  CV:: irregular rate and rhythm, normal S1 and S2, no murmurs, gallops or rubs.  ABD: Soft, nontender, non-distended. positive bowel sounds  Extr: No edema or cyanosis, moves all extremities    Lab Review:     Results from last 7 days  Lab Units 18  0435 18  0431  186   SODIUM mmol/L 133* 135*  < > 134*   POTASSIUM mmol/L 4.7 4.4  < > 4.0   CHLORIDE mmol/L 96* 95*  < > 91*   CO2 mmol/L 25.5 23.7  < > 26.1   BUN mg/dL 26* 23  < > 27*   CREATININE mg/dL 0.72* 0.72*  < > 0.88    GLUCOSE mg/dL 131* 144*  < > 121*   CALCIUM mg/dL 8.9 9.6  < > 10.2   AST (SGOT) U/L  --   --   --  13   ALT (SGPT) U/L  --   --   --  8   < > = values in this interval not displayed.    Results from last 7 days  Lab Units 09/07/18 2126   TROPONIN T ng/mL <0.010       Results from last 7 days  Lab Units 09/13/18  0435 09/12/18  0431   WBC 10*3/mm3 15.09* 10.48   HEMOGLOBIN g/dL 12.6* 13.4*   HEMATOCRIT % 37.7* 41.1   PLATELETS 10*3/mm3 492 461       Results from last 7 days  Lab Units 09/13/18  0704 09/07/18 2126   INR  1.07 1.10   APTT seconds 24.9 33.5       Results from last 7 days  Lab Units 09/07/18 2126   MAGNESIUM mg/dL 2.1           Invalid input(s): LDLCALC    Results from last 7 days  Lab Units 09/07/18 2126   PROBNP pg/mL 628.6       Results from last 7 days  Lab Units 09/07/18  2126   TSH mIU/mL 3.130     I reviewed the patient's new clinical results.  I personally viewed and interpreted the patient's EKG  Current Medications:   Scheduled Meds:  dexamethasone 4 mg Oral Q6H   diltiaZEM  mg Oral Q24H   famotidine 20 mg Oral BID   insulin aspart 0-9 Units Subcutaneous 4x Daily With Meals & Nightly   levETIRAcetam 500 mg Oral Q12H   metoprolol succinate XL 12.5 mg Oral Q24H   nicotine 1 patch Transdermal Q24H   pantoprazole 40 mg Oral Q AM   vitamin B-12 1,000 mcg Oral Daily     Continuous Infusions:  sodium chloride 100 mL/hr Last Rate: 100 mL/hr (09/12/18 1311)       Allergies:  No Known Allergies    Assessment:     Principal Problem:    Typical atrial flutter (CMS/HCC)  Active Problems:    Lung mass    Left rib fracture    Wheezes    Hyponatremia    Atrial flutter with rapid ventricular response (CMS/HCC)    Metastasis to bone (CMS/HCC)    Secondary cancer of brain (CMS/HCC)    Anemia of chronic disease        Plan:   1.  Metastatic lung cancer.  2.  Proximal atrial fibrillation.  Initially was treated with Cardizem however he went into atrial fibrillation yesterday has converted back.  Place  him on a low-dose beta blocker.  He is converted nicely.  He has had some wheezing so that's why there was hesitation utilized but he has tolerated well.  Would continue Cardizem and low-dose beta blocker.  Okay to discharge from my standpoint will sign off        Simone Cheema MD  09/13/18  8:45 AM

## 2018-09-13 NOTE — CONSULTS
64 y/o cauc  male admitted to the hospital w/ elevated HR and found to have metastatic CA.  Access Center ask to see patient for confusion and depression.  Patient has had some confusion. He is aware of his diagnosis. Has been placed on steroids and pain meds.  He is worried a/b his bills especially getting his rent paid.  Patient denies being depressed. He states he has an appetite but has not liked the hospital food. Chart indicates that he did not eat all of the food his brother brought him. He denies sleep issues at home. Patient is eager to get home.  He denies any wish to die, denies having hx of suicidal thoughts or hs or suicide attempts.    .Patient denies any prior mental health or MAGDALENE treatment in the past. He has had multiple DUI's over 20 years ago and went to DUI classes.  He attended AA years ago. He denies use of alcohol and marijuana in over 20 years. He denies use of other illicit drugs. He has smoke approx 1.5 ppd of tobacco for 40 years.  He states he has a brother that has some depression.    Patient is . He has a 11th grade education. 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  He has been a  in the past.  He reports good relationship w/ his x-wife.  He is not aware if he has any current restrictions on driving at this time.      Patient was alert and oriented to month, place, cancer dx. He gave wed as day (it'st Thursday), season as fall.  No a/v  Hallucinations.  He may have some issues w/ the details of treatments. [facility] has worked w/ patient and w/ brother re: this.  Brother is helping w/ disability issues. He reports that he does have people to help him drive.  Patient is denying any SI or HI. He denies any wish to die. He denies any interest in any medications for depression or anxiety.  He plans to be compliant and receive treatment for his ca. At this time patient is not meeting any IP criteria for psych admission. He may have increased confusion r/t the  medications that he will be taken and given which the pre scribers will need to watch for.    Access Center will briefly follow.

## 2018-09-13 NOTE — PLAN OF CARE
Problem: Patient Care Overview  Goal: Plan of Care Review  Outcome: Ongoing (interventions implemented as appropriate)   09/13/18 0136   Coping/Psychosocial   Plan of Care Reviewed With patient   Plan of Care Review   Progress no change   OTHER   Outcome Summary Pt complains of pain and prn meds given with some relief. CT guided lung biopsy this AM. IV fluids cont. VSS. Continue to monitor. Safety maintained.        Problem: Arrhythmia/Dysrhythmia (Symptomatic) (Adult)  Goal: Signs and Symptoms of Listed Potential Problems Will be Absent, Minimized or Managed (Arrhythmia/Dysrhythmia)  Outcome: Ongoing (interventions implemented as appropriate)      Problem: Pain, Acute (Adult)  Goal: Acceptable Pain Control/Comfort Level  Outcome: Ongoing (interventions implemented as appropriate)      Problem: Nutrition, Imbalanced: Inadequate Oral Intake (Adult)  Goal: Identify Related Risk Factors and Signs and Symptoms  Outcome: Outcome(s) achieved Date Met: 09/13/18    Goal: Improved Oral Intake  Outcome: Ongoing (interventions implemented as appropriate)    Goal: Prevent Further Weight Loss  Outcome: Ongoing (interventions implemented as appropriate)

## 2018-09-13 NOTE — PROGRESS NOTES
PROGRESS NOTE  Patient Name: Beltran Leo  Age/Sex: 63 y.o. male  : 1955  MRN: [id number]    Date of Admission: 2018  Date of Encounter Visit: 18   LOS: 6 days   Patient Care Team:  Provider, No Known as PCP - Eliot Wray MD as Consulting Physician (Hematology and Oncology)  Jerica Hou MD as Referring Physician (Internal Medicine)    Chief Complaint: possible lung cancer with mets to liver, brain and bone    Hospital course: patient was admitted on 18 with complaints of palpitations and was found to have atrial fibrillation and converted to sinus rhythm. From prior admission he was found to have lung and liver lesions on prior CT scan and had a liver biopsy on 18 that was inconclusive. He ended up having a bone scan that showed multiple lytic lesions. MRI of the brain showed multiple lesions suggestive of metastatic disease.     CT guided biopsy of the lung was completed on 9/10/18 and results were inconclusive.     Interval History: repeat lung biopsy today.     REVIEW OF SYSTEMS:   CONSTITUTIONAL: no fever or chills,mild improvement in  confusion and agitation per staff and family. Pt states that he wants to go home and take care of some personal matters.   CARDIOVASCULAR: No chest pain, chest pressure or chest discomfort. No palpitations or edema.   RESPIRATORY: No shortness of breath, cough or sputum.   GASTROINTESTINAL: No anorexia, vomiting or diarrhea. No abdominal pain or blood. Nausea and decreased appetite  HEMATOLOGIC: No bleeding or bruising.   Back pain and itching    Ventilator/Non-Invasive Ventilation Settings     None        Vital Signs  Temp:  [97.7 °F (36.5 °C)-98.8 °F (37.1 °C)] 97.7 °F (36.5 °C)  Heart Rate:  [69-97] 72  Resp:  [16-18] 16  BP: (133-147)/(81-88) 139/87  SpO2:  [95 %-98 %] 96 %  on    Device (Oxygen Therapy): room air    Intake/Output Summary (Last 24 hours) at 18 1643  Last data filed at 18 1516   Gross per 24  "hour   Intake              120 ml   Output              100 ml   Net               20 ml     Flowsheet Rows      First Filed Value   Admission Height  167.6 cm (66\") Documented at 09/07/2018 2056   Admission Weight  70.9 kg (156 lb 3.2 oz) Documented at 09/07/2018 0100        Body mass index is 23.24 kg/m².  1    09/07/18  0100 09/08/18  0101   Weight: 70.9 kg (156 lb 3.2 oz) 65.3 kg (143 lb 15.4 oz)       Physical Exam:  GEN:  No acute distress, alert, cooperative, well developed, on room air, flat affect and appears depressed  EYES:   Sclera clear. No icterus. PERRL.   ENT:   External ears/nose normal, no oral lesions, no thrush, mucous membranes moist  NECK:  Supple, midline trachea, no JVD  LUNGS: Normal chest on inspection, diminished bases, no wheezes No rhonchi. No crackles. Respirations regular, even and unlabored.   CV:  Regular rhythm with occasional skipped beats and normal rate. Normal S1/S2. No murmurs, gallops, or rubs noted.  ABD:  Soft, non-tender and non-distended. Normal bowel sounds. No guarding  EXT:  Moves all extremities well. No cyanosis. No redness. No edema.   Skin: dry, intact, no bleeding    Results Review:        Results from last 7 days  Lab Units 09/13/18  0435 09/12/18  0431 09/11/18  0446 09/10/18  0542 09/09/18  1948 09/08/18  0529 09/07/18  2126   SODIUM mmol/L 133* 135* 133* 133* 132* 135* 134*   POTASSIUM mmol/L 4.7 4.4 4.1 3.7 3.9 4.1 4.0   CHLORIDE mmol/L 96* 95* 93* 93* 93* 92* 91*   CO2 mmol/L 25.5 23.7 26.6 27.6 26.6 27.2 26.1   BUN mg/dL 26* 23 22 23 27* 31* 27*   CREATININE mg/dL 0.72* 0.72* 0.70* 0.88 0.91 0.88 0.88   CALCIUM mg/dL 8.9 9.6 10.2 10.2 9.7 10.1 10.2   AST (SGOT) U/L  --   --   --   --   --   --  13   ALT (SGPT) U/L  --   --   --   --   --   --  8   ANION GAP mmol/L 11.5 16.3 13.4 12.4 12.4 15.8 16.9   ALBUMIN g/dL  --   --   --   --   --   --  4.20       Results from last 7 days  Lab Units 09/07/18  2126   TROPONIN T ng/mL <0.010       Results from last 7 " days  Lab Units 09/07/18  2126   TSH mIU/mL 3.130       Results from last 7 days  Lab Units 09/07/18  2126   PROBNP pg/mL 628.6       Results from last 7 days  Lab Units 09/13/18  0435 09/12/18  0431 09/11/18  0446 09/10/18  0542 09/08/18  0529 09/07/18 2126   WBC 10*3/mm3 15.09* 10.48 11.74* 11.29* 11.33* 13.41*   HEMOGLOBIN g/dL 12.6* 13.4* 13.0* 12.4* 13.1* 14.2   HEMATOCRIT % 37.7* 41.1 40.5 38.3* 40.4 42.8   PLATELETS 10*3/mm3 492 461 401 374 369 412   MCV fL 92.2 92.2 94.0 92.3 93.3 93.2   NEUTROPHIL % % 93.7* 90.4* 82.2* 82.3*  --  81.1*   LYMPHOCYTE % % 3.8* 6.6* 10.1* 9.9*  --  12.5*   MONOCYTES % % 2.5* 2.8* 7.1 7.5  --  6.0   EOSINOPHIL % % 0.0* 0.0* 0.2* 0.1*  --  0.1*   BASOPHIL % % 0.0 0.0 0.1 0.0  --  0.1   IMM GRAN % % 0.3 0.2 0.3 0.2  --  0.2       Results from last 7 days  Lab Units 09/13/18  0704 09/07/18 2126   INR  1.07 1.10   APTT seconds 24.9 33.5       Results from last 7 days  Lab Units 09/07/18  2126   MAGNESIUM mg/dL 2.1           Invalid input(s): LDLCALC          Glucose   Date/Time Value Ref Range Status   09/13/2018 1605 158 (H) 70 - 130 mg/dL Final   09/13/2018 1113 136 (H) 70 - 130 mg/dL Final   09/13/2018 0611 142 (H) 70 - 130 mg/dL Final   09/12/2018 2032 146 (H) 70 - 130 mg/dL Final   09/12/2018 1637 144 (H) 70 - 130 mg/dL Final   09/12/2018 1129 139 (H) 70 - 130 mg/dL Final   09/12/2018 0758 143 (H) 70 - 130 mg/dL Final   09/12/2018 0546 152 (H) 70 - 130 mg/dL Final                       Results from last 7 days  Lab Units 09/10/18  1944   SODIUM UR mmol/L 31   OSMOLALITY UR mOsm/kg 783         Imaging:   Imaging Results (all)     Procedure Component Value Units Date/Time    NM Bone Scan Whole Body [[id number]] Collected:  09/10/18 1212     Updated:  09/11/18 0748    Narrative:       NUCLEAR MEDICINE WHOLE BODY BONE SCAN     HISTORY: Fall 1 week ago injuring left rib cage. Hip pain. Suspected  lung cancer with hepatic and bony metastatic disease.     TECHNIQUE:  21.3 mCi of 99m  technetium MDP was administered IV followed  by 3 hour delayed phase whole body imaging with selected spot views.     COMPARISON: CT chest 08/31/2018, CT abdomen and pelvis 08/30/2018,   bone survey 09/09/2018.     FINDINGS:  There is abnormal osseous uptake within several ribs, some of  which corresponds with bone lesions demonstrated with CT. Mild increased  uptake is present left anterior 4th rib where there is a lytic lesion on  CT. There is increased uptake right anterior 6th rib where there is a  lytic lesion. No significant increased uptake is present in the left  lateral 10th rib where CT demonstrated fracture and overlying soft  tissue thickening and suspected mass.     There is increased uptake within the right subtrochanteric region within  the left proximal femur at the level of the lesser trochanter consistent  with metastases. Abnormal uptake is present within the region of the  left acromion where there is a lytic lesion demonstrated with CT. There  is also mild increased uptake overlying the right distal femoral  metaphysis medially consistent with a metastasis. There is also faint  uptake overlying the region of the left maxilla suspicious for a left  maxillary lesion. There is mild increased uptake within the right  manubrium consistent with metastasis. Both kidneys and the urinary  bladder are visualized.     IMPRESSION;  Multifocal lytic osseous metastatic disease.     This report was finalized on 9/11/2018 7:44 AM by Dr. Poli Hansen M.D.       MRI Brain With & Without Contrast [[id number]] Collected:  09/10/18 2257     Updated:  09/10/18 2257    Narrative:       BRAIN MRI WITH AND WITHOUT CONTRAST     HISTORY: lung cancer, stage IV, bone mets; I48.3-Typical atrial flutter;  R91.1-Solitary pulmonary nodule     COMPARISON: None.     FINDINGS:  Multiplanar images of the head were obtained without and with  gadolinium. There are multiple enhancing intra-axial lesions bilaterally  compatible  with diffuse metastases. The largest individual mass involves  the posterior right cerebellum and measures approximately 2.5 x 2.4 x  2.2 cm in greatest dimensions. There is a moderate amount of surrounding  edema which produces some mild narrowing of the fourth ventricle with  minimal leftward displacement. There is some narrowing of the overlying  cerebellar sulci.     Most of the lesions are less than 1 cm. Some exhibits some artifactual  T2 shine through on the diffusion portion of the examination. The larger  lesions, right cerebellum and lateral right thalamus are very  heterogeneous in signal intensity which may be related to hemorrhage  and/or necrosis. There is generalized atrophy. A few scattered small  foci of increased T2 and FLAIR signal abnormality likely related to  chronic ischemic gliotic changes. There is no hydrocephalus. A few  dilated perivascular spaces are present. Midline structures corpus  callosum, pituitary gland, and brainstem are unremarkable. There is no  evidence of extra axial mass or fluid collection.     There is complete opacification of the left maxillary sinus. The globes  and orbital soft tissues are unremarkable. Mastoids are clear.             Impression:       1. Numerous enhancing intra-axial brain lesions compatible with  extensive metastatic disease. Two of the larger lesions on the right  side are very heterogeneous likely as a result of some internal  hemorrhage and/or necrosis.  2. Paranasal sinus findings as above               XR Chest Pa [[id number]] Collected:  09/10/18 1959     Updated:  09/10/18 2006    Narrative:       XR CHEST PA-     HISTORY: Male who is 63 years-old,  post biopsy evaluation     TECHNIQUE: Frontal views of the chest     COMPARISON: 8/30/2018     FINDINGS: Heart, mediastinum and pulmonary vasculature are unremarkable.  Previous CT demonstrated right lower lobe nodule, right hilar lymph  nodes are also suggested in this exam. No focal pulmonary  consolidation,  pleural effusion, or pneumothorax. Gas-filled loops of bowel, partly  included. No acute osseous process.       Impression:       No pneumothorax is identified. Redemonstration of right  lower lobe nodule. Follow-up as clinically indicated.     This report was finalized on 9/10/2018 8:03 PM by Dr. Doyle Keita M.D.       CT Needle Biopsy Lung [[id number]] Collected:  09/10/18 1756     Updated:  09/10/18 1802    Narrative:       CT GUIDED RIGHT LUNG BIOPSY     HISTORY: Right lower lobe pulmonary nodule     After signed informed consent was obtained the patient was prepped and  draped in the supine position. Lidocaine was used for local anesthesia.     An 18-gauge biopsy device was used to obtain core specimens of the right  lower lobe mass.     Confirmatory images were obtained.      Patient tolerated the procedure well. A tiny pneumothorax was visualized  after removal of the needle. Post procedure chest radiographs were  ordered in order to further evaluate for any change in the pneumothorax.     Radiation dose reduction techniques were utilized, including automated  exposure control and exposure modulation based on body size.     This report was finalized on 9/10/2018 5:58 PM by Dr. Chris Cerrato M.D.       XR Bone Survey Complete [[id number]] Collected:  09/09/18 1936     Updated:  09/10/18 0426    Narrative:       X-RAY BONE SURVEY COMPLETE.     HISTORY: Multiple areas of bone pain suspicious lung mass.     COMPARISON: 3/30/2018.     FINDINGS:     The skull demonstrates salt-and-pepper appearance and further evaluation  is recommended. There is a 1.1 cm lucency at the inferior aspect of the  right femoral neck. Another faint lucency seen in the lateral aspect of  the left femur at the proximal diametaphyseal junction. Fracture of the  distal left 11th rib with a lucency may represent a pathological  fracture.     Spondylosis of the spine, no definite lytic lesions.     Soft tissue mass in  the right lung base is redemonstrated.     Soft tissue structures are unremarkable.       Impression:       Abnormal lucencies in the skull, distal left 11th rib and proximal  femora bilaterally. Metastatic disease is suspected, given the history.   Further evaluation with bone scan is recommended.     This report was finalized on 9/10/2018 4:22 AM by Dr. Flash Kauffman M.D.       XR Chest 1 View [[id number]] Collected:  09/07/18 2137     Updated:  09/08/18 1922    Narrative:       CHEST PA AND LATERAL.     HISTORY: Tachycardia.     COMPARISON: 8/20/2018.     FINDINGS:  Cardiomediastinal silhouette is within normal limits.         There is no consolidation or effusion. 2.5 cm soft tissue nodule at the  right lung base is unchanged. A few smaller densities are not as well  visualized.          Impression:       No acute findings. Nodular opacities of the right lung base measuring up  to 2.5 cm, no significant change, neoplastic process is suspected.     This report was finalized on 9/8/2018 7:19 PM by Dr. Flash Kauffman M.D.                  I reviewed the patient's new clinical results.  I personally viewed and interpreted the patient's imaging results:        Medication Review:     dexamethasone 4 mg Oral Q6H   diltiaZEM  mg Oral Q24H   famotidine 20 mg Oral BID   insulin aspart 0-9 Units Subcutaneous 4x Daily With Meals & Nightly   levETIRAcetam 500 mg Oral Q12H   lidocaine 10 mL Intradermal Once   metoprolol succinate XL 12.5 mg Oral Q24H   nicotine 1 patch Transdermal Q24H   pantoprazole 40 mg Oral Q AM   vitamin B-12 1,000 mcg Oral Daily         sodium chloride 100 mL/hr Last Rate: 100 mL/hr (09/13/18 0854)       ASSESSMENT:   1. Multiple pulmonary nodules with mediastinal lymphadenopathy and distant lesion suspicious for lung cancer with metastasis to liver, brain and bone.   2. Metastatic bone lesions with pain   3. Liver lesion with non-diagnostic CT guided biopsy from 8/31/18  4. Suspected  COPD  5. Rib fracture and torn muscle from trying to lift a heavy object  6. New onset atrial fibrillation back in sinus rhythm  7. Anemia   8. Chronic tobacco abuse  9. Depression with flat affect  10. Generalized pain    PLAN:  Discussed case with Dr. Rudolph and repeat biopsy was completed today. Ok to discharge and follow up on results as outpatient. He may need to be set up in a rehab or a SNU given his current situation.    He had not significant benefit with bronchodilators and does not require at discharge.     Consider walking oximetry prior to discharge to verify no need for oxygen at home.       Discussed with patient, brother and staff      Disposition: per admitting  Plan for radiation oncology and follow up with oncology    Padma Rossi MD  09/13/18  4:43 PM      Dictated utilizing Dragon dictation

## 2018-09-14 NOTE — PROGRESS NOTES
Repeat biopsy completed yesterday - path still pending. Note made of probable discharge today or this weekend depending on family and home situation. We are ready to begin brain treatment as soon as pathology finalized. Will plan on contacting patient/family at home on Monday to arrange.

## 2018-09-14 NOTE — PROGRESS NOTES
Name: Beltran Leo ADMIT: 2018   : 1955  PCP: Provider, No Known    MRN: [id number] LOS: 7 days   AGE/SEX: 63 y.o. male  ROOM: N445/1   Subjective   Worsening nausea overnight with dry heaves but no vomiting. No abdominal pain but reports has been several days since bowel movement. Right rib pain present with movement and asking for increased pain medications. Discussed will need to balance his pain level with constipation side effect. No dyspnea or cough reported. No CP. Having weakness with movement but reports able to ambulate ok. Still wanting to go home. Discussed re consulting PT to see if he would benefit from home health and he is agreeable.    Objective   Vital Signs  Temp:  [97.4 °F (36.3 °C)-98.4 °F (36.9 °C)] 97.4 °F (36.3 °C)  Heart Rate:  [69-78] 75  Resp:  [16-18] 16  BP: (135-139)/(82-89) 139/83  SpO2:  [96 %-97 %] 97 %  on   ;   Device (Oxygen Therapy): room air  Body mass index is 23.24 kg/m².    Physical Exam   Constitutional: He is oriented to person, place, and time. He appears well-developed.   HENT:   Head: Normocephalic and atraumatic.   Eyes: Conjunctivae and EOM are normal.   Neck: Normal range of motion. Neck supple.   Cardiovascular: Normal rate, regular rhythm and intact distal pulses.    Pulmonary/Chest: Effort normal. He has no wheezes. He has no rales.   Abdominal: Soft. He exhibits no distension. There is no tenderness.   Musculoskeletal: Normal range of motion. He exhibits no edema.   Neurological: He is alert and oriented to person, place, and time. No cranial nerve deficit.   Skin: Skin is warm and dry. He is not diaphoretic.   Psychiatric: He has a normal mood and affect. His behavior is normal.   Nursing note and vitals reviewed.      Results Review:       I reviewed the patient's new clinical results.     I reviewed telemetry/EKG results, sinus rhythm.      Results from last 7 days  Lab Units 18  0434 18  0435 18  0431 18  0446   WBC  10*3/mm3 17.26* 15.09* 10.48 11.74*   HEMOGLOBIN g/dL 13.1* 12.6* 13.4* 13.0*   PLATELETS 10*3/mm3 383 492 461 401     Results from last 7 days  Lab Units 09/14/18 0434 09/13/18 0435 09/12/18 0431 09/11/18 0446   SODIUM mmol/L 133* 133* 135* 133*   POTASSIUM mmol/L 4.4 4.7 4.4 4.1   CHLORIDE mmol/L 99 96* 95* 93*   CO2 mmol/L 20.8* 25.5 23.7 26.6   BUN mg/dL 22 26* 23 22   CREATININE mg/dL 0.58* 0.72* 0.72* 0.70*   GLUCOSE mg/dL 148* 131* 144* 107*   Estimated Creatinine Clearance: 120.4 mL/min (A) (by C-G formula based on SCr of 0.58 mg/dL (L)).  Results from last 7 days  Lab Units 09/14/18 0434 09/13/18 0435 09/12/18 0431 09/11/18 0446 09/07/18  2126   CALCIUM mg/dL 8.3* 8.9 9.6 10.2  < > 10.2   ALBUMIN g/dL  --   --   --   --   --  4.20   MAGNESIUM mg/dL  --   --   --   --   --  2.1   < > = values in this interval not displayed.      dexamethasone 4 mg Oral Q6H   diltiaZEM  mg Oral Q24H   famotidine 20 mg Oral BID   insulin aspart 0-9 Units Subcutaneous 4x Daily With Meals & Nightly   levETIRAcetam 500 mg Oral Q12H   lidocaine 10 mL Intradermal Once   metoprolol succinate XL 12.5 mg Oral Q24H   nicotine 1 patch Transdermal Q24H   pantoprazole 40 mg Oral Q AM   vitamin B-12 1,000 mcg Oral Daily       sodium chloride 100 mL/hr Last Rate: 100 mL/hr (09/14/18 0927)   Diet Regular; Cardiac      Assessment/Plan      Active Hospital Problems    Diagnosis Date Noted   • **Typical atrial flutter (CMS/HCC) [I48.3] 09/07/2018   • Secondary cancer of brain (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      - Metastatic Lung Cancer: Metastatic disease to rib/brain/liver. Initial Bx was negative so repeat CT guided Bxcompleted. Pathology pending. Continue decadron, keppra, norco. Check walking oximetry. Pulmonology, Med Onc, Rad Onc are following.  - Atrial Flutter: Now on Diltiazem and Metoprolol. Cardiology evaluated.  - Hyponatremia: Mild and stable.  - NV: Constipation related to pain medication contributing. Will schedule bowel regimen and start additional prn medications. Monitor.  - Weakness: Re-consult PT. He is adamant about returning home but willing to consider home health now.  - Disposition: Home/possibly tomorrow    Eduardo Cates MD  [facility]  09/14/18  10:05 AM

## 2018-09-14 NOTE — PROGRESS NOTES
Continued Stay Note  [facility]     Patient Name: Beltran Leo  MRN: [id number]  Today's Date: 9/14/2018    Admit Date: 9/7/2018          Discharge Plan     Row Name 09/14/18 1556       Plan    Plan Home with Palliative consult.     Plan Comments Family has agreed to palliative care at discharge; Access evaluation completed. CLARA German              Discharge Codes    No documentation.           Mercedes Patel RN

## 2018-09-14 NOTE — SIGNIFICANT NOTE
09/14/18 1423   Rehab Time/Intention   Evaluation Not Performed patient/family declined evaluation  (pt refused. pt given encouragement to participate, still refused stating he was too tired. RN Neela informed)   Rehab Treatment   Discipline physical therapist

## 2018-09-14 NOTE — PROGRESS NOTES
Met w/ patient and with patient's brother. Patient states no pain today. Had N/v last pm.  Discussed need to let family help him remember.  Brother and sister very supportive. Discussed w/ Dr. Cates.  He is okay w/  providing advanced directive information.  Dr. Cates is ordering palliative to provide additional information as well. [facility] will sign off.

## 2018-09-14 NOTE — PROGRESS NOTES
PROGRESS NOTE  Patient Name: Beltran Leo  Age/Sex: 63 y.o. male  : 1955  MRN: [id number]    Date of Admission: 2018  Date of Encounter Visit: 18   LOS: 7 days   Patient Care Team:  Provider, No Known as PCP - Eliot Wray MD as Consulting Physician (Hematology and Oncology)  Jerica Hou MD as Referring Physician (Internal Medicine)    Chief Complaint: possible lung cancer with mets to liver, brain and bone    Hospital course: patient was admitted on 18 with complaints of palpitations and was found to have atrial fibrillation and converted to sinus rhythm. From prior admission he was found to have lung and liver lesions on prior CT scan and had a liver biopsy on 18 that was inconclusive. He ended up having a bone scan that showed multiple lytic lesions. MRI of the brain showed multiple lesions suggestive of metastatic disease.     CT guided biopsy of the lung was completed on 9/10/18 and results were inconclusive. Repeat biopsy was done on 2018  Liver biopsy done on prior admission was negative as well    Interval History: repeat lung biopsy today.     REVIEW OF SYSTEMS:   CONSTITUTIONAL: no fever or chills,withdrawn depressed, some times confused.   CARDIOVASCULAR: No chest pain, chest pressure or chest discomfort. No palpitations or edema.   RESPIRATORY: No shortness of breath, cough or sputum.   GASTROINTESTINAL: No anorexia, vomiting or diarrhea. No abdominal pain or blood. decreased appetite  HEMATOLOGIC: No bleeding or bruising.       Ventilator/Non-Invasive Ventilation Settings     None        Vital Signs  Temp:  [97.4 °F (36.3 °C)-98.9 °F (37.2 °C)] 98.9 °F (37.2 °C)  Heart Rate:  [69-86] 86  Resp:  [16-18] 18  BP: (135-147)/(82-89) 147/89  SpO2:  [93 %-97 %] 93 %  on    Device (Oxygen Therapy): room air    Intake/Output Summary (Last 24 hours) at 18 1500  Last data filed at 18 5024   Gross per 24 hour   Intake             1120 ml  "  Output              550 ml   Net              570 ml     Flowsheet Rows      First Filed Value   Admission Height  167.6 cm (66\") Documented at 09/07/2018 2056   Admission Weight  70.9 kg (156 lb 3.2 oz) Documented at 09/07/2018 0100        Body mass index is 23.24 kg/m².  1    09/07/18  0100 09/08/18  0101   Weight: 70.9 kg (156 lb 3.2 oz) 65.3 kg (143 lb 15.4 oz)       Physical Exam:  GEN:  No acute distress, alert, cooperative, well developed, on room air, flat affect and appears depressed  EYES:   Sclera clear. No icterus. PERRL.   ENT:   External ears/nose normal, no oral lesions, no thrush, mucous membranes moist  NECK:  Supple, midline trachea, no JVD  LUNGS: Normal chest on inspection, diminished bases, no wheezes No rhonchi. No crackles. Respirations regular, even and unlabored.   CV:  Regular rhythm and normal rate. Normal S1/S2. No murmurs, gallops, or rubs noted.  ABD:  Soft, non-tender and non-distended. Normal bowel sounds. No guarding  EXT:  Moves all extremities well. No cyanosis. No redness. No edema.   Skin: dry, intact, no bleeding    Results Review:        Results from last 7 days  Lab Units 09/14/18  0434 09/13/18  0435 09/12/18  0431 09/11/18  0446 09/10/18  0542 09/09/18  1948 09/08/18  0529 09/07/18  2126   SODIUM mmol/L 133* 133* 135* 133* 133* 132* 135* 134*   POTASSIUM mmol/L 4.4 4.7 4.4 4.1 3.7 3.9 4.1 4.0   CHLORIDE mmol/L 99 96* 95* 93* 93* 93* 92* 91*   CO2 mmol/L 20.8* 25.5 23.7 26.6 27.6 26.6 27.2 26.1   BUN mg/dL 22 26* 23 22 23 27* 31* 27*   CREATININE mg/dL 0.58* 0.72* 0.72* 0.70* 0.88 0.91 0.88 0.88   CALCIUM mg/dL 8.3* 8.9 9.6 10.2 10.2 9.7 10.1 10.2   AST (SGOT) U/L  --   --   --   --   --   --   --  13   ALT (SGPT) U/L  --   --   --   --   --   --   --  8   ANION GAP mmol/L 13.2 11.5 16.3 13.4 12.4 12.4 15.8 16.9   ALBUMIN g/dL  --   --   --   --   --   --   --  4.20       Results from last 7 days  Lab Units 09/07/18  2126   TROPONIN T ng/mL <0.010       Results from last 7 " days  Lab Units 09/07/18 2126   TSH mIU/mL 3.130       Results from last 7 days  Lab Units 09/07/18  2126   PROBNP pg/mL 628.6       Results from last 7 days  Lab Units 09/14/18  0434 09/13/18  0435 09/12/18  0431 09/11/18  0446 09/10/18  0542 09/08/18  0529 09/07/18 2126   WBC 10*3/mm3 17.26* 15.09* 10.48 11.74* 11.29* 11.33* 13.41*   HEMOGLOBIN g/dL 13.1* 12.6* 13.4* 13.0* 12.4* 13.1* 14.2   HEMATOCRIT % 41.3 37.7* 41.1 40.5 38.3* 40.4 42.8   PLATELETS 10*3/mm3 383 492 461 401 374 369 412   MCV fL 94.7 92.2 92.2 94.0 92.3 93.3 93.2   NEUTROPHIL % % 92.2* 93.7* 90.4* 82.2* 82.3*  --  81.1*   LYMPHOCYTE % % 5.4* 3.8* 6.6* 10.1* 9.9*  --  12.5*   MONOCYTES % % 2.0* 2.5* 2.8* 7.1 7.5  --  6.0   EOSINOPHIL % % 0.0* 0.0* 0.0* 0.2* 0.1*  --  0.1*   BASOPHIL % % 0.1 0.0 0.0 0.1 0.0  --  0.1   IMM GRAN % % 0.3 0.3 0.2 0.3 0.2  --  0.2       Results from last 7 days  Lab Units 09/13/18  0704 09/07/18 2126   INR  1.07 1.10   APTT seconds 24.9 33.5       Results from last 7 days  Lab Units 09/07/18 2126   MAGNESIUM mg/dL 2.1           Invalid input(s): LDLCALC          Glucose   Date/Time Value Ref Range Status   09/14/2018 1114 128 70 - 130 mg/dL Final   09/14/2018 0609 172 (H) 70 - 130 mg/dL Final   09/13/2018 2053 195 (H) 70 - 130 mg/dL Final   09/13/2018 1605 158 (H) 70 - 130 mg/dL Final   09/13/2018 1113 136 (H) 70 - 130 mg/dL Final   09/13/2018 0611 142 (H) 70 - 130 mg/dL Final   09/12/2018 2032 146 (H) 70 - 130 mg/dL Final   09/12/2018 1637 144 (H) 70 - 130 mg/dL Final                       Results from last 7 days  Lab Units 09/10/18  1944   SODIUM UR mmol/L 31   OSMOLALITY UR mOsm/kg 783         Imaging:   Imaging Results (all)     Procedure Component Value Units Date/Time    NM Bone Scan Whole Body [[id number]] Collected:  09/10/18 1212     Updated:  09/11/18 0748    Narrative:       NUCLEAR MEDICINE WHOLE BODY BONE SCAN     HISTORY: Fall 1 week ago injuring left rib cage. Hip pain. Suspected  lung cancer with  hepatic and bony metastatic disease.     TECHNIQUE:  21.3 mCi of 99m technetium MDP was administered IV followed  by 3 hour delayed phase whole body imaging with selected spot views.     COMPARISON: CT chest 08/31/2018, CT abdomen and pelvis 08/30/2018,   bone survey 09/09/2018.     FINDINGS:  There is abnormal osseous uptake within several ribs, some of  which corresponds with bone lesions demonstrated with CT. Mild increased  uptake is present left anterior 4th rib where there is a lytic lesion on  CT. There is increased uptake right anterior 6th rib where there is a  lytic lesion. No significant increased uptake is present in the left  lateral 10th rib where CT demonstrated fracture and overlying soft  tissue thickening and suspected mass.     There is increased uptake within the right subtrochanteric region within  the left proximal femur at the level of the lesser trochanter consistent  with metastases. Abnormal uptake is present within the region of the  left acromion where there is a lytic lesion demonstrated with CT. There  is also mild increased uptake overlying the right distal femoral  metaphysis medially consistent with a metastasis. There is also faint  uptake overlying the region of the left maxilla suspicious for a left  maxillary lesion. There is mild increased uptake within the right  manubrium consistent with metastasis. Both kidneys and the urinary  bladder are visualized.     IMPRESSION;  Multifocal lytic osseous metastatic disease.     This report was finalized on 9/11/2018 7:44 AM by Dr. Poli Hansen M.D.       MRI Brain With & Without Contrast [[id number]] Collected:  09/10/18 2257     Updated:  09/10/18 2257    Narrative:       BRAIN MRI WITH AND WITHOUT CONTRAST     HISTORY: lung cancer, stage IV, bone mets; I48.3-Typical atrial flutter;  R91.1-Solitary pulmonary nodule     COMPARISON: None.     FINDINGS:  Multiplanar images of the head were obtained without and with  gadolinium. There  are multiple enhancing intra-axial lesions bilaterally  compatible with diffuse metastases. The largest individual mass involves  the posterior right cerebellum and measures approximately 2.5 x 2.4 x  2.2 cm in greatest dimensions. There is a moderate amount of surrounding  edema which produces some mild narrowing of the fourth ventricle with  minimal leftward displacement. There is some narrowing of the overlying  cerebellar sulci.     Most of the lesions are less than 1 cm. Some exhibits some artifactual  T2 shine through on the diffusion portion of the examination. The larger  lesions, right cerebellum and lateral right thalamus are very  heterogeneous in signal intensity which may be related to hemorrhage  and/or necrosis. There is generalized atrophy. A few scattered small  foci of increased T2 and FLAIR signal abnormality likely related to  chronic ischemic gliotic changes. There is no hydrocephalus. A few  dilated perivascular spaces are present. Midline structures corpus  callosum, pituitary gland, and brainstem are unremarkable. There is no  evidence of extra axial mass or fluid collection.     There is complete opacification of the left maxillary sinus. The globes  and orbital soft tissues are unremarkable. Mastoids are clear.             Impression:       1. Numerous enhancing intra-axial brain lesions compatible with  extensive metastatic disease. Two of the larger lesions on the right  side are very heterogeneous likely as a result of some internal  hemorrhage and/or necrosis.  2. Paranasal sinus findings as above               XR Chest Pa [[id number]] Collected:  09/10/18 1959     Updated:  09/10/18 2006    Narrative:       XR CHEST PA-     HISTORY: Male who is 63 years-old,  post biopsy evaluation     TECHNIQUE: Frontal views of the chest     COMPARISON: 8/30/2018     FINDINGS: Heart, mediastinum and pulmonary vasculature are unremarkable.  Previous CT demonstrated right lower lobe nodule, right hilar  lymph  nodes are also suggested in this exam. No focal pulmonary consolidation,  pleural effusion, or pneumothorax. Gas-filled loops of bowel, partly  included. No acute osseous process.       Impression:       No pneumothorax is identified. Redemonstration of right  lower lobe nodule. Follow-up as clinically indicated.     This report was finalized on 9/10/2018 8:03 PM by Dr. Doyle Keita M.D.       CT Needle Biopsy Lung [[id number]] Collected:  09/10/18 1756     Updated:  09/10/18 1802    Narrative:       CT GUIDED RIGHT LUNG BIOPSY     HISTORY: Right lower lobe pulmonary nodule     After signed informed consent was obtained the patient was prepped and  draped in the supine position. Lidocaine was used for local anesthesia.     An 18-gauge biopsy device was used to obtain core specimens of the right  lower lobe mass.     Confirmatory images were obtained.      Patient tolerated the procedure well. A tiny pneumothorax was visualized  after removal of the needle. Post procedure chest radiographs were  ordered in order to further evaluate for any change in the pneumothorax.     Radiation dose reduction techniques were utilized, including automated  exposure control and exposure modulation based on body size.     This report was finalized on 9/10/2018 5:58 PM by Dr. Chris Cerrato M.D.       XR Bone Survey Complete [[id number]] Collected:  09/09/18 1936     Updated:  09/10/18 0426    Narrative:       X-RAY BONE SURVEY COMPLETE.     HISTORY: Multiple areas of bone pain suspicious lung mass.     COMPARISON: 3/30/2018.     FINDINGS:     The skull demonstrates salt-and-pepper appearance and further evaluation  is recommended. There is a 1.1 cm lucency at the inferior aspect of the  right femoral neck. Another faint lucency seen in the lateral aspect of  the left femur at the proximal diametaphyseal junction. Fracture of the  distal left 11th rib with a lucency may represent a pathological  fracture.     Spondylosis  of the spine, no definite lytic lesions.     Soft tissue mass in the right lung base is redemonstrated.     Soft tissue structures are unremarkable.       Impression:       Abnormal lucencies in the skull, distal left 11th rib and proximal  femora bilaterally. Metastatic disease is suspected, given the history.   Further evaluation with bone scan is recommended.     This report was finalized on 9/10/2018 4:22 AM by Dr. Flash Kauffman M.D.       XR Chest 1 View [[id number]] Collected:  09/07/18 2137     Updated:  09/08/18 1922    Narrative:       CHEST PA AND LATERAL.     HISTORY: Tachycardia.     COMPARISON: 8/20/2018.     FINDINGS:  Cardiomediastinal silhouette is within normal limits.         There is no consolidation or effusion. 2.5 cm soft tissue nodule at the  right lung base is unchanged. A few smaller densities are not as well  visualized.          Impression:       No acute findings. Nodular opacities of the right lung base measuring up  to 2.5 cm, no significant change, neoplastic process is suspected.     This report was finalized on 9/8/2018 7:19 PM by Dr. Flash Kauffman M.D.                  I reviewed the patient's new clinical results.  I personally viewed and interpreted the patient's imaging results:        Medication Review:     dexamethasone 4 mg Oral Q6H   diltiaZEM  mg Oral Q24H   famotidine 20 mg Oral BID   insulin aspart 0-9 Units Subcutaneous 4x Daily With Meals & Nightly   levETIRAcetam 500 mg Oral Q12H   lidocaine 10 mL Intradermal Once   metoprolol succinate XL 12.5 mg Oral Q24H   nicotine 1 patch Transdermal Q24H   pantoprazole 40 mg Oral Q AM   polyethylene glycol 17 g Oral Daily   vitamin B-12 1,000 mcg Oral Daily         sodium chloride 100 mL/hr Last Rate: 100 mL/hr (09/14/18 0922)       ASSESSMENT:   1. Multiple pulmonary nodules with mediastinal lymphadenopathy and distant lesion suspicious for lung cancer with metastasis to liver, brain and bone.   2. Metastatic bone lesions  with pain   3. Liver lesion with non-diagnostic CT guided biopsy from 8/31/18  4. Suspected COPD  5. Rib fracture and torn muscle from trying to lift a heavy object  6. New onset atrial fibrillation back in sinus rhythm  7. Anemia   8. Chronic tobacco abuse  9. Depression with flat affect  10. Generalized pain    PLAN:  Follow up on the repeat biopsy  Oncology and radiation oncology on board, no active respiratory issues  Will follow prn    Disposition: per admitting  Plan for radiation oncology and follow up with oncology    Padma Rossi MD  09/14/18  3:00 PM      Dictated utilizing Dragon dictation

## 2018-09-14 NOTE — PLAN OF CARE
Problem: Patient Care Overview  Goal: Plan of Care Review  Outcome: Ongoing (interventions implemented as appropriate)   09/14/18 0135   Coping/Psychosocial   Plan of Care Reviewed With patient   Plan of Care Review   Progress no change   OTHER   Outcome Summary Pt complains of constant pain and prn pain meds given. Pt was educated on the importance of not driving and not being left alone regarding his new diagnosis. Family is working on a plan for his discharge.CT biopsy of lung done and results pending. IVF cont. VSS. Continue to monitor. Safety maintained.        Problem: Arrhythmia/Dysrhythmia (Symptomatic) (Adult)  Goal: Signs and Symptoms of Listed Potential Problems Will be Absent, Minimized or Managed (Arrhythmia/Dysrhythmia)  Outcome: Ongoing (interventions implemented as appropriate)      Problem: Pain, Acute (Adult)  Goal: Acceptable Pain Control/Comfort Level  Outcome: Ongoing (interventions implemented as appropriate)      Problem: Nutrition, Imbalanced: Inadequate Oral Intake (Adult)  Goal: Improved Oral Intake  Outcome: Ongoing (interventions implemented as appropriate)    Goal: Prevent Further Weight Loss  Outcome: Ongoing (interventions implemented as appropriate)

## 2018-09-14 NOTE — ACP (ADVANCE CARE PLANNING)
I was requested to see patient and brother regarding an Advance Directive.  I provided AD material and explained to both how an Advance Directive works.  At this time, they want to read through the material before completing.

## 2018-09-15 PROBLEM — C34.90 NON-SMALL CELL LUNG CANCER (NSCLC) (HCC): Status: ACTIVE | Noted: 2018-01-01

## 2018-09-15 NOTE — PLAN OF CARE
Problem: Patient Care Overview  Goal: Plan of Care Review  Outcome: Ongoing (interventions implemented as appropriate)   09/14/18 2492   Coping/Psychosocial   Plan of Care Reviewed With patient   Plan of Care Review   Progress no change   OTHER   Outcome Summary pt continues having constant pain- relieved somewhat with 2 norco, c/o nausea this am relieved with zofran and pain med, hardly eating, had to be encouraged multilple times to eat 1/2 breadstick and dring mighty shake for dinner, got up to chair x30 minutes once today- refused to get up more. remains in SR, continue to monitor, for possible discharge tomorrow, number given to pt's brother Manolo for [facility] palliative outpatient home care. safety maintained

## 2018-09-15 NOTE — PLAN OF CARE
Problem: Patient Care Overview  Goal: Plan of Care Review  Outcome: Ongoing (interventions implemented as appropriate)   09/15/18 0552   Coping/Psychosocial   Plan of Care Reviewed With patient   Plan of Care Review   Progress no change   OTHER   Outcome Summary Pt asked for pain med. Pt did rest some. Dilaudid x 2 and Norco q4 hours. Pt eager to go home. Depressed and anxious. Pt became confused and took IV out, heart monitor. Ready to go home. Used urinal at bedside. SR, room air. Oncology left note to start radiation when d/c'd. Cont to monitor, safety maintained.        Problem: Pain, Acute (Adult)  Goal: Acceptable Pain Control/Comfort Level  Outcome: Ongoing (interventions implemented as appropriate)      Problem: Nutrition, Imbalanced: Inadequate Oral Intake (Adult)  Goal: Improved Oral Intake  Outcome: Ongoing (interventions implemented as appropriate)    Goal: Prevent Further Weight Loss  Outcome: Ongoing (interventions implemented as appropriate)      Problem: Skin Injury Risk (Adult)  Goal: Identify Related Risk Factors and Signs and Symptoms  Outcome: Ongoing (interventions implemented as appropriate)      Problem: Fall Risk (Adult)  Goal: Identify Related Risk Factors and Signs and Symptoms  Outcome: Ongoing (interventions implemented as appropriate)

## 2018-09-15 NOTE — SIGNIFICANT NOTE
09/15/18 1409   Rehab Time/Intention   Evaluation Not Performed other (see comments)  (Pt is d/c today per CLARA Parks. )   Rehab Treatment   Discipline physical therapist

## 2018-09-15 NOTE — DISCHARGE SUMMARY
Date of Admission: 9/7/2018  Date of Discharge:  9/15/2018  Primary Care Physician: Provider, No Known     Discharge Diagnosis:  Active Hospital Problems    Diagnosis Date Noted   • **Non-small cell lung cancer (NSCLC) (CMS/HCC) [C34.90] 08/31/2018   • Secondary cancer of brain (CMS/HCC) [C79.31] 09/11/2018   • Anemia of chronic disease [D63.8] 09/11/2018   • Atrial flutter with rapid ventricular response (CMS/HCC) [I48.92] 09/10/2018   • Metastasis to bone (CMS/HCC) [C79.51] 09/10/2018   • Hyponatremia [E87.1] 09/09/2018   • Left rib fracture [S22.32XA] 09/08/2018   • Wheezes [R06.2] 09/08/2018   • Typical atrial flutter (CMS/HCC) [I48.3] 09/07/2018      Resolved Hospital Problems    Diagnosis Date Noted Date Resolved   No resolved problems to display.       Presenting Problem/History of Present Illness:  Typical atrial flutter (CMS/HCC) [I48.3]  Atrial flutter with rapid ventricular response (CMS/HCC) [I48.92]     62 yo male who presented to the ER after the sudden onset of racing heart. He was found to be in atrial flutter with a heartrate in the 170's. He was given adenosine and metoprolol and then started on a diltiazem drip. At this point he has now converted to NSR. Presently he denies any CP or SOA.   He was recently admitted after he presented with left hip pain and was found to have a right lung mass. He also was found to have multiple lesions in his liver that were suspicious for metastatic disease. A biopsy was done of one of the liver lesions. The path did not show any cancer however. He has some chronic cough but no worse than usual.    Hospital Course:  The patient is a 63 y.o. male who presented with pain and RVR. He was admitted and rate controlled with diltiazem drip and then he was converted to oral regimen. Cardiology was consulted. He has lung mass, left sided pathologic rib fracture, liver mass, and metastastic disease to brain. Pulmonology and Medical Oncology were consulted. He underwent Ct  guided biopsy of his mass. This proved inconclusive. Repeat Ct guided biopsy was performed and the pathology from the repeat biopsy confirmed non small cell lung cancer today. Radiation Oncology was consulted. Lung tumor is presumed primary. He has been maintained on decadron and keppra and has been seizure free while here. He has been anxious for discharge but also had depressed mood. SSRI will be started. After discharge, he and family will need to follow up with radiation oncology to initiate treatment. He also will need to follow up with medical oncology to determine if he is candidate for any palliative chemotherapy. Family has been in contact with home hospice and are arranging meeting once he returns home.     Exam Today:  Constitutional: He is oriented to person, place, and time. He appears well-developed.   HENT:   Head: Normocephalic and atraumatic.   Eyes: Conjunctivae and EOM are normal.   Neck: Normal range of motion. Neck supple.   Cardiovascular: Normal rate, regular rhythm and intact distal pulses.    Pulmonary/Chest: Effort normal. He has no wheezes. He has no rales.   Abdominal: Soft. He exhibits no distension. There is no tenderness.   Musculoskeletal: Normal range of motion. He exhibits no edema.   Neurological: He is alert and oriented to person, place, and time. No cranial nerve deficit.   Skin: Skin is warm and dry. He is not diaphoretic.   Psychiatric: He has a normal mood and affect. His behavior is normal.     Results:  Tissue Pathology Exam   Order: [id number]   Status:  Edited   Visible to patient:  No (Not Released)   Component 2d ago   Clinical Information    Re-biopsy of RLL subpleural nodule   Final Diagnosis   1: RIGHT LOWER LOBE OF LUNG BIOPSY:               POORLY DIFFERENTIATED NON SMALL CELL CARCINOMA WITH SPINDLE CELL FEATURES.     COMMENT: An initial battery of immunoperoxidase stains has been performed.  Tumor cells are strongly positive for CK7 and Pancytokeratin.  Tumor cells  are negative for squamous markers, CK5/6, P63 and P40.  Tumor cells are negative for  lung markers including TTF1 and Napsin.  Tumor cells are also negative for melanoma markers MART-1 and S100.  A request has also been received from Dr. Arriaga for Foundation testing.  Paraffin block will be forwarded for such testing.     2: RIGHT LOWER LOBE OF LUNG BIOPSY:               FORWARDED TO [facility] ONCOLOGY FOR FLOW CYTOMETRY.           MRI Brain  1. Numerous enhancing intra-axial brain lesions compatible with  extensive metastatic disease. Two of the larger lesions on the right  side are very heterogeneous likely as a result of some internal  hemorrhage and/or necrosis.  2. Paranasal sinus findings as above         NM Bone Scan  Multifocal lytic osseous metastatic disease.    TTE  · Calculated EF = 64%.  · Left ventricular systolic function is normal.  · Left ventricular diastolic dysfunction (grade I) consistent with impaired relaxation.  · Trace to mild tricuspid valve regurgitation is present.  · Estimated right ventricular systolic pressure from tricuspid regurgitation is normal (<35 mmHg).  · Patient noted to have a lot of PVCs during echo    Procedures Performed:         Consults:   Consults     Date and Time Order Name Status Description    9/11/2018 0921 Inpatient Radiation Oncology Consult Completed     9/10/2018 1554 Hematology & Oncology Inpatient Consult Completed     9/8/2018 1240 Inpatient Pulmonology Consult Completed     9/7/2018 2346 Inpatient Cardiology Consult Completed     9/7/2018 2316 [facility] (on-call MD unless specified) Completed     9/7/2018 2226 [facility] (on-call MD unless specified) Completed     8/31/2018 1336 Inpatient Orthopedic Surgery Consult Completed     8/31/2018 0318 Hematology & Oncology Inpatient Consult Completed     8/30/2018 2308 [facility] (on-call MD unless specified) Completed            Discharge Disposition:  Home or Self Care    Discharge Medications:     Discharge Medications      New  Medications      Instructions Start Date   cyanocobalamin 1000 MCG tablet  Commonly known as:  VITAMIN B-12   1,000 mcg, Oral, Daily      dexamethasone 4 MG tablet  Commonly known as:  DECADRON   4 mg, Oral, Every 6 Hours Scheduled      diltiaZEM  MG 24 hr capsule  Commonly known as:  CARDIZEM CD   240 mg, Oral, Daily      famotidine 20 MG tablet  Commonly known as:  PEPCID   20 mg, Oral, 2 Times Daily      FLUoxetine 20 MG capsule  Commonly known as:  PROzac   20 mg, Oral, Daily      HYDROcodone-acetaminophen  MG per tablet  Commonly known as:  NORCO  Replaces:  HYDROcodone-acetaminophen 7.5-325 MG per tablet   1-2 tablets, Oral, Every 4 Hours PRN      levETIRAcetam 500 MG tablet  Commonly known as:  KEPPRA   500 mg, Oral, Every 12 Hours Scheduled      metoprolol succinate XL 25 MG 24 hr tablet  Commonly known as:  TOPROL-XL   12.5 mg, Oral, Daily      ondansetron 4 MG tablet  Commonly known as:  ZOFRAN   4 mg, Oral, Every 8 Hours PRN      pantoprazole 40 MG EC tablet  Commonly known as:  PROTONIX   40 mg, Oral, Daily      polyethylene glycol pack packet  Commonly known as:  MIRALAX   17 g, Oral, Daily      promethazine 25 MG suppository  Commonly known as:  PHENERGAN   25 mg, Rectal, Every 6 Hours PRN      sennosides-docusate sodium 8.6-50 MG tablet  Commonly known as:  SENOKOT-S   2 tablets, Oral, 2 Times Daily PRN         Stop These Medications    HYDROcodone-acetaminophen 7.5-325 MG per tablet  Commonly known as:  NORCO  Replaced by:  HYDROcodone-acetaminophen  MG per tablet            Discharge Diet:   Diet Instructions     Advance Diet As Tolerated             Activity at Discharge:   Activity Instructions     Activity as Tolerated       Discharge Activity Restrictions       1) No driving          Follow-up Appointments:  Future Appointments  Date Time Provider Department Center   9/17/2018 12:30 PM  [facility]  [facility]   9/25/2018 12:50 PM LAB CHAIR [facility]  [facility]    9/25/2018 1:20 PM Eliot Arriaga MD [facility]     Additional Instructions for the Follow-ups that You Need to Schedule     Ambulatory Referral to Home Hospice    As directed      Please evaluate Beltran Leo for admission to Hospice.    Patient/Family aware of referral: yes    Services to provide: Evaluate    Physician to follow patient's care (the person listed here will be responsible for signing ongoing orders): PCP    Referring Provider Call-back Phone Number: [phone]    Requested Start of Care Date: Tomorrow    Special Instructions:    Order Comments:  Please evaluate Beltran Leo for admission to Hospice. Patient/Family aware of referral: yes Services to provide: Evaluate Physician to follow patient's care (the person listed here will be responsible for signing ongoing orders): PCP Referring Provider Call-back Phone Number: [phone] Requested Start of Care Date: Tomorrow Special Instructions:          Ambulatory Referral to Physical Therapy Evaluate and treat    As directed      Specialty needed:  Evaluate and treat         Discharge Follow-up with PCP    As directed      Currently Documented PCP:  Provider, No Known  PCP Phone Number:  [phone]    Follow Up Details:  1-2 weeks         Discharge Follow-up with Specified Provider: Dr Hawkins, Radiation Oncology    As directed      To:  Dr Hawkins, Radiation Oncology    Follow Up Details:  as scheduled         Discharge Follow-up with Specified Provider: Dr Rudolph, Medical Oncology    As directed      To:  Dr Rudolph, Medical Oncology    Follow Up Details:  as scheduled               Test Results Pending at Discharge:   Order Current Status    Tissue Pathology Exam Collected (09/10/18 1330)           Eduardo Cates MD  09/15/18  10:50 AM    Time Spent on Discharge Activities: >30 minutes

## 2018-09-15 NOTE — PROGRESS NOTES
LOS: 8 days       Chief Complaint:  Metastatic non-small cell carcinoma, suspected lung origin with right lower lobe primary lesion, right hilar and mediastinal lymphadenopathy, liver and bone metastases along with brain metastases.        Interval History: The patient today is preparing to be discharged home.  Very flat affect.  He has no current complaints except constipation.  The patient and his family did meet with hospice and are scheduled to have a home visit tomorrow.  There are tentative plans to begin radiation therapy to the brain on Monday and further follow-up scheduled in [facility] office on 9/25/18.        Review of Systems:    Review of systems was obtained with pertinent positive findings as noted in the interval history.  All other systems negative.    Objective     Vital Signs  Temp:  [97.8 °F (36.6 °C)-98.7 °F (37.1 °C)] 97.8 °F (36.6 °C)  Heart Rate:  [71-85] 71  Resp:  [18] 18  BP: (134-139)/(86-97) 139/97        Physical Exam:     GENERAL:  no acute distress.   MOUTH:  Tongue is well-papillated; no stomatitis or ulcers.  Lips normal.  NECK:  Supple with good range of motion; no thyromegaly or masses, no JVD.  LYMPHATICS:  No cervical, supraclavicular, adenopathy.  CHEST:  Lungs clear to percussion and auscultation. Good airflow.  CARDIAC:  Regular rate and rhythm without murmurs, rubs or gallops. Normal S1,S2.  ABDOMEN:  Soft, nontender with no organomegaly or masses.  EXTREMITIES:  No clubbing, cyanosis or edema.  NEUROLOGICAL:  Cranial Nerves II-XII grossly intact.  No focal neurological deficits.  PSYCHIATRIC:  Flat affect          Results Review:     I reviewed the patient's new clinical results.      Results from last 7 days  Lab Units 09/15/18  0638   WBC 10*3/mm3 19.50*   HEMOGLOBIN g/dL 13.1*   HEMATOCRIT % 40.1*   PLATELETS 10*3/mm3 416     Lab Results   Component Value Date    NEUTROABS 18.12 (H) 09/15/2018       Results from last 7 days  Lab Units 09/15/18  0638   SODIUM mmol/L 134*    POTASSIUM mmol/L 4.6   CHLORIDE mmol/L 97*   CO2 mmol/L 23.4   BUN mg/dL 20   CREATININE mg/dL 0.55*   GLUCOSE mg/dL 137*   CALCIUM mg/dL 8.7       Results from last 7 days  Lab Units 09/13/18  0704   INR  1.07   APTT seconds 24.9             Medication Review: Yes     Assessment/Plan     1. 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  Found to have evidence of metastatic malignancy during hospitalization at the end of August 2018.  · CT abdomen/pelvis 8/30/18 with multiple liver lesions, largest 1.5 cm left, multiple renal lesions (some do not appear to be cysts), bilateral adrenal enlargement (2.1 cm right) with suspicion for metastasis.  Displaced fracture left 10th rib.  · CT chest 8/31/18 with right lower lobe pleural-based necrotic 2.1 similar nodule, right hilar necrotic lymph nodes up to 2.2 cm, mediastinal necrotic lymph nodes up to 2.2 cm with partial collapse right middle lobe, fracture left 10th rib, lytic lesion left fourth rib.  · Skeletal survey 9/9/18 with lytic lesions involving skull, 11th rib, proximal femora  · Bone scan 9/10/18 with multiple metastatic lesions: Left fourth rib, right sixth rib, left 10th rib, right subpectoral trochanteric region, left proximal femur, left acromion, right distal femur, left maxilla, right manubrium  · MRI brain 9/10/18 with multiple metastases largest right cerebellum 2.5 cm with moderate surrounding vasogenic edema.  Most lesions less than 1 cm.  2 larger lesions right with heterogeneous appearance with suspected internal hemorrhage and/or necrosis.  · CT-guided biopsy liver 8/31/18 with no malignancy, mild focal periportal fibrosis  · CT-guided biopsy right lower lobe nodule 9/10/18 with no malignancy  · CT-guided biopsy right lower lobe nodule 9/13/18 with poorly differentiated non-small cell carcinoma with spindle cell features.  Negative squamous markers, negative lung markers,  negative melanoma markers.  Foundation medicine testing pending including PDL 1.  · Patient seen by radiation oncology with plans to initiate whole brain irradiation 9/17/18.  Currently receiving dexamethasone 4 mg every 6 hours.  Also receiving Keppra 500 mg every 12 hours  · Clinical scenario consistent with metastatic primary lung cancer despite immunohistochemical findings.  · Follow-up scheduled in [facility] office with Dr. Arriaga 9/25/18 to review Foundation medicine result/PDL 1 and discuss potential options for systemic therapy following completion of radiation.  · Patient and family did meet with hospice prior to discharge from hospital.  Hospice will be meeting at the patient's home tomorrow.  We will need to coordinate with hospice if the patient decides to continue on with active treatment for his malignancy.  Currently, he intends to proceed with radiation and possible systemic therapy.  2. Depression:  · Initiated prozac 20mg daily at discharge per Dr Dennis  3. Atrial fibrillation:  · Returned to sinus rhythm, no anticoagulation due to possible hemorrhage into brain metastases  · On Cardizem 240 mg daily and metoprolol 12.5mg daily  4. GI prophylaxis/GERD:  · On Pepcid 20 mg twice a day and Protonix 40 mg daily  5. Cancer related pain:  · On hydrocodone 10/325 as needed  6. Narcotic induced constipation:  · Senokot 2 twice daily and MiraLAX daily    Plan:  1. Note plans for discharge home today  2. Patient plans to follow up at home with hospice tomorrow  3. Follow-up as scheduled with radiation oncology 9/17/18 to potentially begin whole brain radiation  4. Follow-up as scheduled in [facility] office with Dr. Arriaga 9/25/18 to review Foundation medicine/PDL 1 results and discuss options for systemic therapy following radiation.    The patient and all the above issues were new to me today.  I did review the patient's pathologic diagnosis and discussed palliative/supportive care option as well as active palliative  treatment option for his disease with radiation and systemic therapy.  Patient is inclined to proceed with active therapy does also wish to have relative/supportive care of possible.        Joseph Evans MD  09/15/18  2:11 PM

## 2018-09-15 NOTE — SIGNIFICANT NOTE
Spoke with patient's brother Manolo re: pt not a candidate for our palliative care but may be candidate for [facility] outpatient palliative care at home once discharged. Number for [facility] given to Manolo [phone] and instructed they are open 24 hours.  He will need to call and give them information to see if he qualifies for their care.  Understanding verbalized by Manolo.

## 2018-09-16 NOTE — TELEPHONE ENCOUNTER
"Caller has called 3 times about his brother's metoprolol and zofran that were not at the pharmacy.  A message was sent to case management on the 15th about this issue but it has not been resolved.  I spoke with CLARA Villagomez from case management and gave her the information and the contact number.  I called Mr. Leo and let him know that someone would take care of it today.    Reason for Disposition  • [1] Prescription not at pharmacy AND [2] was prescribed today by PCP    Additional Information  • Negative: Drug overdose and nurse unable to answer question  • Negative: Caller requesting information not related to medicine  • Negative: Caller requesting a prescription for Strep throat and has a positive culture result  • Negative: Rash while taking a medication or within 3 days of stopping it  • Negative: Immunization reaction suspected  • Negative: [1] Asthma and [2] having symptoms of asthma (cough, wheezing, etc)  • Negative: MORE THAN A DOUBLE DOSE of a prescription or over-the-counter (OTC) drug  • Negative: [1] DOUBLE DOSE (an extra dose or lesser amount) of over-the-counter (OTC) drug AND [2] any symptoms (e.g., dizziness, nausea, pain, sleepiness)  • Negative: [1] DOUBLE DOSE (an extra dose or lesser amount) of prescription drug AND [2] any symptoms (e.g., dizziness, nausea, pain, sleepiness)  • Negative: Took another person's prescription drug  • Negative: [1] DOUBLE DOSE (an extra dose or lesser amount) of prescription drug AND [2] NO symptoms (Exception: a double dose of antibiotics)  • Negative: Diabetes drug error or overdose (e.g., insulin or extra dose)  • Negative: [1] Request for URGENT new prescription or refill of \"essential\" medication (i.e., likelihood of harm to patient if not taken) AND [2] triager unable to fill per unit policy    Answer Assessment - Initial Assessment Questions  1. SYMPTOMS: \"Do you have any symptoms?\"      nausea  2. SEVERITY: If symptoms are present, ask \"Are they mild, " "moderate or severe?\"      moderate    Protocols used: MEDICATION QUESTION CALL-ADULT-      "

## 2018-09-16 NOTE — TELEPHONE ENCOUNTER
"Manolo is calling about Beltran and states that some of his RX's were not sent to [facility] at discharge. They do not have Metoprolol which will be due tomorrow morning, Zofran that is PRN, Mirlax, or Senokot-S. I explained that both Miralax and Senokot-S could be gotten OTC. Email sent to Case Management for follow-up.     Reason for Disposition  • Caller has medication question only, adult not sick, and triager answers question    Additional Information  • Negative: Drug overdose and nurse unable to answer question  • Negative: Caller requesting information not related to medicine  • Negative: Caller requesting a prescription for Strep throat and has a positive culture result  • Negative: Rash while taking a medication or within 3 days of stopping it  • Negative: Immunization reaction suspected  • Negative: [1] Asthma and [2] having symptoms of asthma (cough, wheezing, etc)  • Negative: MORE THAN A DOUBLE DOSE of a prescription or over-the-counter (OTC) drug  • Negative: [1] DOUBLE DOSE (an extra dose or lesser amount) of over-the-counter (OTC) drug AND [2] any symptoms (e.g., dizziness, nausea, pain, sleepiness)  • Negative: [1] DOUBLE DOSE (an extra dose or lesser amount) of prescription drug AND [2] any symptoms (e.g., dizziness, nausea, pain, sleepiness)  • Negative: Took another person's prescription drug  • Negative: [1] DOUBLE DOSE (an extra dose or lesser amount) of prescription drug AND [2] NO symptoms (Exception: a double dose of antibiotics)  • Negative: Diabetes drug error or overdose (e.g., insulin or extra dose)  • Negative: [1] Request for URGENT new prescription or refill of \"essential\" medication (i.e., likelihood of harm to patient if not taken) AND [2] triager unable to fill per unit policy  • Negative: [1] Prescription not at pharmacy AND [2] was prescribed today by PCP  • Negative: Pharmacy calling with prescription questions and triager unable to answer question  • Negative: Caller has URGENT " "medication question about med that PCP prescribed and triager unable to answer question  • Negative: Caller has NON-URGENT medication question about med that PCP prescribed and triager unable to answer question  • Negative: Caller requesting a NON-URGENT new prescription or refill and triager unable to refill per unit policy  • Negative: Caller has medication question about med not prescribed by PCP and triager unable to answer question (e.g., compatibility with other med, storage)  • Negative: [1] DOUBLE DOSE (an extra dose or lesser amount) of over-the-counter (OTC) drug AND [2] NO symptoms  • Negative: [1] DOUBLE DOSE (an extra dose or lesser amount) of antibiotic drug AND [2] NO symptoms    Answer Assessment - Initial Assessment Questions  1. SYMPTOMS: \"Do you have any symptoms?\"      No  2. SEVERITY: If symptoms are present, ask \"Are they mild, moderate or severe?\"      N/A    Protocols used: MEDICATION QUESTION CALL-ADULT-      "

## 2018-09-16 NOTE — PROGRESS NOTES
Continued Stay Note  [facility]     Patient Name: Beltran Leo  MRN: [id number]  Today's Date: 9/16/2018    Admit Date: 9/7/2018          Discharge Plan     Row Name 09/16/18 1334       Plan    Plan Comments Call this AM from the [facility] call center: S/W Debra who states after hours call received from patient's brother Eliot Leo ([phone]) who states patients scripts for Metoprolol XL and Zofran did not reach his pharmacy. Per orders in [facility] those scripts were e-scripted over to [facility] on [address]. Call to Pharmacy at 1020am and S/W Shayy who states they did not receive those two scripts, verified they did recieve and fill all other scripts. Call to Dr. Cates at 1030 and 1130, call back at 1140 and updated MD of [facility]. Dr. Cates e-scripted two medications to patient's pharmacy and this CCP called and S/W Shayy at [facility] [address] at 1240 to verify they received medication scripts. Shayy at [facility] states scripts recieved and medications ready for pick-up. Call to patient's brother Eliot and updated that medications ready to pick-up at [facility]. S/W [facility] intake [facility] intake nurse to meet with family at patient's home at 5:30pm today. Brother denies further needs/concerns at this time.........Ava ELIZABETH               Discharge Codes    No documentation.       Expected Discharge Date and Time     Expected Discharge Date Expected Discharge Time    Sep 15, 2018             Dahlia Encinas, CLARA

## 2018-09-16 NOTE — OUTREACH NOTE
Prep Survey      Responses   Facility patient discharged from?  [facility]   Is patient eligible?  No   What are the reasons patient is not eligible?  Hospice/Pallative Care   Discharge diagnosis  lung cancer   Does the patient have one of the following disease processes/diagnoses(primary or secondary)?  Other   Prep survey completed?  Yes          Carrol Mcknight RN

## 2018-09-17 NOTE — PROGRESS NOTES
Case Management Discharge Note    Final Note: Home with Hospice     Destination     No service has been selected for the patient.      Durable Medical Equipment     No service has been selected for the patient.      Dialysis/Infusion     No service has been selected for the patient.      Home Medical Care     No service has been selected for the patient.      Social Care     No service has been selected for the patient.             Final Discharge Disposition Code: 50 - home with hospice

## 2018-09-18 NOTE — TELEPHONE ENCOUNTER
Pts sister in law called and stated pt is unable to tolerate mask and is in a lot of pain. Notified MD and we suggested pt come in and get a prescription for pain and xanax was sent into pharmacy.

## 2018-09-18 NOTE — TELEPHONE ENCOUNTER
Oncology Social Work    Phone call received from patient's sister, Genoveva Sullivan ( [phone]) who states that patient is in extreme pain, unable to come into XRT treatment due to pain and inability to get out of bed and no home health support in place.   OSW reviewed patient's chart.  Patient was d/c'd from the hospital on 9/15/2018 to his brother's home Address is: [address] [place], [place] [id number].  D/C plan was [facility] to eval at home.    Patient admitted with advanced metastatic non -small cell carcinoma with liver, bone and brain mets.  Patient to undergo palliative XRT to brain.  Further discussion about performance status and tolerance of systemic treatment with Dr. Arriaga on 9/25/2018 once all results are back.      On this Day, 9/18/2018, patient is not current with [facility] services in the home.  OSW called [facility] intake to inquire status.  [facility] states that patient not eligible for [facility] due to still trying to make decision on whether to pursue systemic treatment or not.  A referral was made to [facility].  OSW call Yvette with  home Team.  She states that patient and family are still undecided.  OSW called patient and spoke to his ex wife.  She states that they are ready to proceed now with [facility] at home and unsure he can tolerate any further XRT or any other type of treatment.  Patient's pain is not well managed.  Encouraged ex wife and patient to call Yvette with  Home Team through [facility]. ( [phone]).  Encourage patient and ex wife to call [facility] office for further triage advice due to pain and symptom management.  Number Provided.     OSW will follow up tomorrow to [facility] to provide support and coordination of in home care.    Thank you,  Komal Wheeler, MSSW, CSW, OSW-C

## 2018-09-19 NOTE — TELEPHONE ENCOUNTER
----- Message from Dee Lucas sent at 9/19/2018 12:15 PM EDT -----  Contact: [phone]  Pt's brother is calling about medication being called to [facility] for his nerves for scans.  Manolo Leo

## 2018-09-19 NOTE — TELEPHONE ENCOUNTER
Pt's brother called and said pt was supposed to have a script for Xanax at his pharmacy to take before radiation. There is a script that was under clinic administered meds by Dr Hawkins that didn't go to pt's pharmacy. Also there was no pharmacy selected. Pharmacy preference corrected to the [facility]. Call placed to Dr Hawkins's office to see if the Xanax could be re- escribed, left message with Constance.

## 2018-09-19 NOTE — PROGRESS NOTES
Oncology Social Work    OSW followed with up Yvette RN with [facility] home team.  She states that she spoke with patient's ex wife this morning and they are leaning towards initiating [facility] services at home.  OSW will stay in contact with Yvette and family to cont to with coordination of care.  OSW attempted to call patient, sister and brother - No answer.  Will cont to try and reach patient.    Thank you,  Komal Wheeler, LIZZETHW, CSW, OSW-C

## 2018-09-20 NOTE — PROGRESS NOTES
[facility] INPATIENT PROGRESS NOTE  Subjective     CC: Metastatic non-small cell lung cancer      Interval history:         Beltran Leo is a 63 y.o. male with newly diagnosed metastatic non small cell lung cancer.  He was discharged from the hospital on 09/16/2000 813 and has formal hospital follow-up scheduled with Dr. Arriaga next week, 09/25/2018.  Upon discharge he was to meet with hospice to hear more information about services provided.  He also was scheduled to undergo palliative radiation, however, has been unable to undergo treatment because he becomes quite claustrophobic with the required mask.  He called our office reporting that his abdominal/hip/ and chest  pain is not controlled with his current regimen which includes 10 mg hydrocodone every 2 hours.  He has not tried alternative medications.  He also complains of significant constipation.  He has not tried laxatives or stool softeners up to this point.    's report his pain is stable and he doesn't feel that it has progressed since discharge.  He also denies any new symptoms since his discharge.           Medications:  The current medication list was reviewed in the EMR.    Allergies:  No Known Allergies    Review of Systems   Constitutional: Positive for appetite change, fatigue and unexpected weight change (About a 30 pounds in the past 4 months). Negative for chills, diaphoresis and fever.   HENT: Negative for congestion, hearing loss, nosebleeds, sinus pressure and voice change.    Eyes: Negative for visual disturbance.   Respiratory: Positive for cough (No hemoptysis). Negative for shortness of breath.  Stable.  Cardiovascular: Positive for chest pain (Left chest wall). Negative for leg swelling.   Gastrointestinal: See history of present illness.  Endocrine: Negative for heat intolerance.   Genitourinary: Negative for dysuria and frequency.   Musculoskeletal: See history of present illness.  Allergic/Immunologic: Negative for  food allergies.   Neurological: Negative for dizziness, seizures and headaches.   Hematological: Negative for adenopathy. Does not bruise/bleed easily.   Psychiatric/Behavioral: Negative for agitation and confusion.       Objective      Vitals:    09/20/18 1520   BP: 112/70   Pulse: 106   Resp: 16   Temp: 98.6 °F (37 °C)   SpO2: 98%        Physical exam:   GENERAL:  Well-developed, well-nourished in no acute distress.  Is a strong odor of smoke.  He is accompanied by his brother.  SKIN:  Warm, dry without rashes, purpura or petechiae.  HEAD:  Normocephalic.  EYES:  Pupils equal, round and reactive to light.  EOMs intact.  Conjunctivae normal.  EARS:  Hearing intact.  NOSE:  Septum midline.  No excoriations or nasal discharge.  MOUTH:  Tongue is well-papillated; no stomatitis or ulcers.  Lips normal.  THROAT:  Oropharynx without lesions or exudates.  NECK:  Supple with good range of motion; no thyromegaly or masses, no JVD.  LYMPHATICS:  No cervical, supraclavicular, axillary or inguinal adenopathy.  CHEST:  Lungs clear to percussion and auscultation. Good airflow.  CARDIAC:  Regular rate and rhythm without murmurs, rubs or gallops. Normal S1,S2.  ABDOMEN:  Soft, nontender with no organomegaly or masses.  EXTREMITIES:  No clubbing, cyanosis or edema.  NEUROLOGICAL:  Cranial Nerves II-XII grossly intact.  No focal neurological deficits.  PSYCHIATRIC:  Normal affect and mood.        RECENT LABS:      Results from last 7 days  Lab Units 09/20/18  1457 09/15/18  0638 09/14/18  0434   WBC 10*3/mm3 21.82* 19.50* 17.26*   HEMOGLOBIN g/dL 14.6 13.1* 13.1*   HEMATOCRIT % 42.9 40.1* 41.3   PLATELETS 10*3/mm3 390* 416 383       Results from last 7 days  Lab Units 09/20/18  1457 09/15/18  0638 09/14/18  0434   SODIUM mmol/L 130* 134* 133*   POTASSIUM mmol/L 4.9* 4.6 4.4   CHLORIDE mmol/L 91* 97* 99   CO2 mmol/L 24.1 23.4 20.8*   BUN mg/dL 35* 20 22   CREATININE mg/dL 0.59* 0.55* 0.58*   CALCIUM mg/dL 8.8 8.7 8.3*   BILIRUBIN  mg/dL 0.7  --   --    ALK PHOS U/L 107  --   --    ALT (SGPT) U/L 25  --   --    AST (SGOT) U/L 15  --   --    GLUCOSE mg/dL 131* 137* 148*           Assessment/Plan     1. Cancer related pain.  He has been taking hydrocodone 10 mg every 2 hours which provides him with minimal relief.  I have reviewed his symptoms with Dr. Carreno,  #2 in the office today, and we will change his medication to oxycontin 20 mg twice a day with OxyCodone 10/325 every 6 hours as needed.  I have provided him with #20 and #40 respectively.  This should be enough to manage his pain until his next scheduled visit with Dr. Arriaga for formal hospital follow-up.  I have asked the patient to call the office should his pain worsen or this not provide him any relief.    2.  Narcotic induced constipation.  I have asked the patient to try MiraLAX today  and Senokot 2 tabs daily.  I have guided the patient and his brother with written instructions today.                 Whitney Nelson, APRN  9/20/2018  4:17 PM

## 2018-09-20 NOTE — PROGRESS NOTES
Oncology Social Work     OSW called and spoke to patient's brother Manolo Leo.  Manolo states that patient still unable to participate in XRT treatment yesterday due to extreme pain and anxiety.   Discussed need for home assistance either through [facility] home services vs HH.  Explained that if patient is only pursing 10 XRT treatments than he is still eligible for home [facility] care.  If patient plans to pursue any systemic treatment or Immunotherapy than he would not be eligible for [facility] at home.      Discussed that [facility] may be a an option and would be covered by his insurance.  Message sent to Dr. Rudolph regarding care coordination for this patient.    Patient does not have a PCP -  Brother states that they are trying to obtain one.  Discussed Living will and advanced care planning.  Brother states that he has spoken to a  and they will be working on getting these documents completed.      [facility] OSW or myself can assist with referrals to either [facility] or [facility] .  Please advise,  Thank you,  Komal Wheeler, LIZZETHW, CSW, OSW-C

## 2018-09-21 NOTE — ED PROVIDER NOTES
EMERGENCY DEPARTMENT ENCOUNTER    CHIEF COMPLAINT  Chief Complaint: Constipation   History given by: Pt and family  History limited by: none  Room Number:   PMD: Provider, No Known      HPI:  Pt is a 63 y.o. male who presents complaining of constipation that has been going on for 2 weeks. Pt confirms mild abd pain but can be severe, loss of appetite, mild SOA. Pt takes Oxycontin. Pt denies fever, headache, CP, cough, N/V, dysuria, black or bloody stool. Pt states he uses Mirolax and Senokot-S for constipation. Pt has lung cancer recently diagnosed and not treated yet.  He had small PTX after recent lung bx but did not get chest tube.    Duration/Onset/Timin weeks ago   Location: abd   Radiation: none   Quality: pain  Intensity/Severity: mild to severe  Associated Symptoms: mild abd pain but can be severe, loss of appetite, mild SOA  Aggravating or Alleviating Factors: none stated  Previous Episodes: none stated      PAST MEDICAL HISTORY  Active Ambulatory Problems     Diagnosis Date Noted   • Hematuria 2018   • Closed fracture of one rib 2018   • Non-small cell lung cancer (NSCLC) (CMS/HCC) 2018   • Abnormal CT of the abdomen 2018   • Left hip pain 2018   • Typical atrial flutter (CMS/HCC) 2018   • Left rib fracture 2018   • Wheezes 2018   • Hyponatremia 2018   • Atrial flutter with rapid ventricular response (CMS/HCC) 09/10/2018   • Metastasis to bone (CMS/HCC) 09/10/2018   • Secondary cancer of brain (CMS/HCC) 2018   • Anemia of chronic disease 2018     Resolved Ambulatory Problems     Diagnosis Date Noted   • No Resolved Ambulatory Problems     Past Medical History:   Diagnosis Date   • Abnormal CT of the abdomen    • H/O Liver lesions    • H/O Lung nodule        PAST SURGICAL HISTORY  Past Surgical History:   Procedure Laterality Date   • LIVER BIOPSY  2018    CT guided       FAMILY HISTORY  Family History   Problem Relation Age of  Onset   • Heart attack Mother    • Heart attack Father        SOCIAL HISTORY  Social History     Social History   • Marital status: Single     Spouse name: N/A   • Number of children: N/A   • Years of education: N/A     Occupational History   •  [employer]     Social History Main Topics   • Smoking status: Current Every Day Smoker   • Smokeless tobacco: Not on file   • Alcohol use No   • Drug use: No   • Sexual activity: Not on file     Other Topics Concern   • Not on file     Social History Narrative   • No narrative on file       ALLERGIES  Patient has no known allergies.    REVIEW OF SYSTEMS  Review of Systems   Constitutional: Positive for appetite change (loss). Negative for fever.   HENT: Negative.  Negative for sore throat.    Eyes: Negative.    Respiratory: Positive for shortness of breath (mild). Negative for cough.    Cardiovascular: Negative.  Negative for chest pain.   Gastrointestinal: Positive for abdominal pain (mild) and constipation. Negative for nausea and vomiting.   Genitourinary: Negative.  Negative for dysuria.        Denies black or bloody stool.    Musculoskeletal: Negative.  Negative for back pain.   Skin: Negative.  Negative for rash.   Neurological: Negative.  Negative for headaches.   All other systems reviewed and are negative.      PHYSICAL EXAM  ED Triage Vitals   Temp Heart Rate Resp BP SpO2   09/21/18 1447 09/21/18 1447 09/21/18 1447 09/21/18 1544 09/21/18 1447   97.4 °F (36.3 °C) 84 18 105/84 97 %      Temp src Heart Rate Source Patient Position BP Location FiO2 (%)   -- -- -- -- --              Physical Exam   Constitutional: No distress.   HENT:   Head: Normocephalic and atraumatic.   Mouth/Throat: Oropharynx is clear and moist.   Eyes: Conjunctivae are normal.   Cardiovascular: Normal rate and regular rhythm.    Pulmonary/Chest: No respiratory distress. He has decreased breath sounds (bilaterally).   Respiration is unlabored.  Sats 97% RA.   Abdominal: There is no  tenderness.   Musculoskeletal: He exhibits no edema or tenderness.   Neurological: He is alert.   Skin: No rash noted.   Nursing note and vitals reviewed.      LAB RESULTS  Lab Results (last 24 hours)     Procedure Component Value Units Date/Time    CBC & Differential [[id number]] Collected:  09/21/18 1615    Specimen:  Blood Updated:  09/21/18 1650    Narrative:       The following orders were created for panel order CBC & Differential.  Procedure                               Abnormality         Status                     ---------                               -----------         ------                     Scan Slide[[id number]]                   Normal              Final result               CBC Auto Differential[[id number]]        Abnormal            Final result                 Please view results for these tests on the individual orders.    Comprehensive Metabolic Panel [[id number]]  (Abnormal) Collected:  09/21/18 1615    Specimen:  Blood Updated:  09/21/18 1652     Glucose 120 (H) mg/dL      BUN 31 (H) mg/dL      Creatinine 0.68 (L) mg/dL      Sodium 129 (L) mmol/L      Potassium 4.5 mmol/L      Chloride 91 (L) mmol/L      CO2 23.6 mmol/L      Calcium 8.6 mg/dL      Total Protein 6.9 g/dL      Albumin 3.60 g/dL      ALT (SGPT) 24 U/L      AST (SGOT) 18 U/L      Alkaline Phosphatase 106 U/L      Total Bilirubin 0.7 mg/dL      eGFR Non African Amer 118 mL/min/1.73      Globulin 3.3 gm/dL      A/G Ratio 1.1 g/dL      BUN/Creatinine Ratio 45.6 (H)     Anion Gap 14.4 mmol/L     Lipase [[id number]]  (Abnormal) Collected:  09/21/18 1615    Specimen:  Blood Updated:  09/21/18 1652     Lipase 182 (H) U/L     CBC Auto Differential [[id number]]  (Abnormal) Collected:  09/21/18 1615    Specimen:  Blood Updated:  09/21/18 1650     WBC 25.01 (H) 10*3/mm3      RBC 5.08 10*6/mm3      Hemoglobin 15.5 g/dL      Hematocrit 47.8 %      MCV 94.1 fL      MCH 30.5 pg      MCHC 32.4 (L) g/dL      RDW 12.3 %      RDW-SD 41.2 fl      MPV  10.8 fL      Platelets 327 10*3/mm3      Neutrophil % 85.9 (H) %      Lymphocyte % 10.8 (L) %      Monocyte % 2.3 (L) %      Eosinophil % 0.0 (L) %      Basophil % 0.1 %      Immature Grans % 0.9 (H) %      Neutrophils, Absolute 21.48 (H) 10*3/mm3      Lymphocytes, Absolute 2.70 10*3/mm3      Monocytes, Absolute 0.58 10*3/mm3      Eosinophils, Absolute 0.00 10*3/mm3      Basophils, Absolute 0.03 10*3/mm3      Immature Grans, Absolute 0.22 (H) 10*3/mm3     Scan Slide [[id number]]  (Normal) Collected:  09/21/18 1615    Specimen:  Blood Updated:  09/21/18 1650     RBC Morphology Normal     WBC Morphology Normal     Platelet Morphology Normal          I ordered the above labs and reviewed the results    RADIOLOGY  XR Abdomen KUB   Final Result      XR Chest 2 View            XR Abd KUB- shows a large amount of semisolid stool  throughout the colon, particularly in the right colon and transverse  colon and consistent with patient's history of constipation. There is no  evidence of small bowel distention.    CXR- shows a moderate-sized pneumothorax is present on the right, increased in size versus 09/10/2018. There is approximately 2.9 cm between the visceral  and parietal pleura at the right lung apex, previously approximately 6  mm. There is an area of increased density present at the right lung base  laterally, corresponding to a nodule. This abutted the pleural surface  and there is approximately 12 mm of separation between the visceral and  parietal pleura at the right lung base laterally. There is increased  density present at the right lung base suggesting right lower lobe  atelectasis medial to the nodule and there is prominence of the  infrahilar region on the right suggesting atelectasis/adenopathy.    I ordered the above noted radiological studies. Interpreted by radiologist. Reviewed by me in PACS.       PROCEDURES  Procedures      PROGRESS AND CONSULTS  ED Course as of Sep 21 1907   Fri Sep 21, 2018   1559 Pt  with h/o lung and brain cancer.  No bm x 2 weeks despite miralax and stool softeners.  No n/v  [EP]   1634 Per Radiologist, pt has possible small PTX from lung biopsy on Tuesday. Will evaluate further with CXR 2 view  [EP]   1721 Pt with persistent leukocytosis - possibly due to steroids or CA.  No evidence of infxn on exam.  Will discuss increased PTX with chest surgery. WBC: (!) 25.01 [DB]   1903 7:03 PM  Discussed PTX with pt and family. D/W Dr John.  Had long talk with pt and family about placing a chest tube.  Pt does NOT want a chest tube - he is anxious to go home.  Family would rather him get a chest tube, but pt cannot be persuaded.    [DB]      ED Course User Index  [DB] Brian Lucas MD  [EP] Susan Kair Arielle, APRN     1716  Notified pt of collapsed lung. Discussed plan to place call to chest surgeon to possibly place chest tube.     1719  Call placed to Chest Surgery.     1835  Discussed pt's case with Dr. John (Surgery) who states it is pt's choice to choose chest tube insertion.     1845  Rechecked pt. Pt is resting comfortably and feels is breathing better. Notified pt of discussion with Dr. John. Discussed whether pt and family want to place chest tube in to inflate lungs. Pt declines, but family wants to do it. Family states it is pt's choice and will agree with pt. Discussed plan to discharge pt home. Pt to [facility] with worsening SOA. Instructed pt to double up on Mirolax. Pt understands and agrees with the plan, all questions answered.    Dora  MEDICAL DECISION MAKING  Results were reviewed/discussed with the patient and they were also made aware of online access. Pt also made aware that some labs, such as cultures, will not be resulted during ER visit and follow up with PMD is necessary.     MDM  Number of Diagnoses or Management Options  Drug-induced constipation:   Non-small cell cancer of right lung (CMS/HCC):   Postprocedural pneumothorax:      Amount and/or Complexity of Data  Reviewed  Clinical lab tests: reviewed and ordered (WBC= 25.01, Lipase= 182)  Tests in the radiology section of CPT®: reviewed and ordered (CXR- shows a moderate-sized pneumothorax is present on the right, increased in size versus 09/10/2018.    XR Abd KUB- shows a large amount of semisolid stool  throughout the colon, particularly in the right colon and transverse  colon and consistent with patient's history of constipation. There is no  evidence of small bowel distention.      )  Decide to obtain previous medical records or to obtain history from someone other than the patient: yes  Obtain history from someone other than the patient: yes (Pt's family. )  Review and summarize past medical records: yes (Pt was hospitalized 9/07/18- 9/15/18 with [facility]. Diagnosed with lung cancer with mets. )  Discuss the patient with other providers: yes (Dr. John (Surgery))  Independent visualization of images, tracings, or specimens: yes           DIAGNOSIS  Final diagnoses:   Drug-induced constipation   Postprocedural pneumothorax   Non-small cell cancer of right lung (CMS/HCC)       DISPOSITION  DISCHARGE    Patient discharged in stable condition.    Reviewed implications of results, diagnosis, meds, responsibility to follow up, warning signs and symptoms of possible worsening, potential complications and reasons to return to ER.    Patient/Family voiced understanding of above instructions.    Discussed plan for discharge, as there is no emergent indication for admission. Patient referred to primary care provider for BP management due to today's BP. Pt/family is agreeable and understands need for follow up and repeat testing.  Pt is aware that discharge does not mean that nothing is wrong but it indicates no emergency is present that requires admission and they must continue care with follow-up as given below or physician of their choice.     FOLLOW-UP  Eliot Arriaga MD  [address]  [address]  [phone]      As scheduled         Medication List      No changes were made to your prescriptions during this visit.           Latest Documented Vital Signs:  As of 7:07 PM  BP- 105/84 HR- 84 Temp- 97.4 °F (36.3 °C) O2 sat- 97%    --  Documentation assistance provided by abhi De León for Dr. Lucas. Information recorded by the scribe was done at my direction and has been verified and validated by me.                 Perlita Cleveland  09/21/18 1907       Brian Lucas MD  09/21/18 1918

## 2018-09-21 NOTE — ED TRIAGE NOTES
Patient states that he has not had a bowel movement in over 2 weeks, patient states his stomach is very uncomfortable. Also states that he feels dizzy at times.

## 2018-09-21 NOTE — TELEPHONE ENCOUNTER
Pt sis in law called to let us know pt will not be coming to radiation today he will be going to ER for his constipation

## 2018-09-23 NOTE — ED PROVIDER NOTES
EMERGENCY DEPARTMENT ENCOUNTER    CHIEF COMPLAINT  Chief Complaint: Need to place chest tube   History given by: Pt  History limited by: none  Room Number: 11/11  PMD: Provider, No Known      HPI:  Pt is a 63 y.o. male who presents for reevaluation of a recent pneumothorax.  He was here just a few days ago and diagnosed with constipation and a slightly worsening pneumothorax.  He declined tube thoracostomy at that time.. Pt confirms constipation has slightly improved. Pt states his breathing feels fine.  He denies chest pain.  Pt denies SOA, cough, N/V, diarrhea.     Duration:  2 days ago   Onset: gradual  Timing: constant  Location: chest  Radiation: none  Quality: none  Intensity/Severity: moderate  Progression: unchanged  Associated Symptoms: none  Aggravating Factors: none   Alleviating Factors: none  Previous Episodes: Gradual Problem  Treatment before arrival: none stated    PAST MEDICAL HISTORY  Active Ambulatory Problems     Diagnosis Date Noted   • Hematuria 08/31/2018   • Closed fracture of one rib 08/31/2018   • Non-small cell lung cancer (NSCLC) (CMS/HCC) 08/31/2018   • Abnormal CT of the abdomen 08/31/2018   • Left hip pain 08/31/2018   • Typical atrial flutter (CMS/HCC) 09/07/2018   • Left rib fracture 09/08/2018   • Wheezes 09/08/2018   • Hyponatremia 09/09/2018   • Atrial flutter with rapid ventricular response (CMS/HCC) 09/10/2018   • Metastasis to bone (CMS/HCC) 09/10/2018   • Secondary cancer of brain (CMS/HCC) 09/11/2018   • Anemia of chronic disease 09/11/2018     Resolved Ambulatory Problems     Diagnosis Date Noted   • No Resolved Ambulatory Problems     Past Medical History:   Diagnosis Date   • Abnormal CT of the abdomen    • H/O Liver lesions    • H/O Lung nodule        PAST SURGICAL HISTORY  Past Surgical History:   Procedure Laterality Date   • LIVER BIOPSY  08/31/2018    CT guided       FAMILY HISTORY  Family History   Problem Relation Age of Onset   • Heart attack Mother    • Heart  attack Father        SOCIAL HISTORY  Social History     Social History   • Marital status: Single     Spouse name: N/A   • Number of children: N/A   • Years of education: N/A     Occupational History   •  [employer]     Social History Main Topics   • Smoking status: Current Every Day Smoker   • Smokeless tobacco: Not on file   • Alcohol use No   • Drug use: No   • Sexual activity: Not on file     Other Topics Concern   • Not on file     Social History Narrative   • No narrative on file       ALLERGIES  Patient has no known allergies.    REVIEW OF SYSTEMS  Review of Systems   Constitutional: Negative for activity change, appetite change and fever.   HENT: Negative for congestion and sore throat.    Eyes: Negative.    Respiratory: Negative for cough and shortness of breath.    Cardiovascular: Negative for chest pain and leg swelling.   Gastrointestinal: Negative for abdominal pain, diarrhea, nausea and vomiting.   Endocrine: Negative.    Genitourinary: Negative for decreased urine volume and dysuria.   Musculoskeletal: Negative for neck pain.   Skin: Negative for rash and wound.   Allergic/Immunologic: Negative.    Neurological: Negative for weakness, numbness and headaches.   Hematological: Negative.    Psychiatric/Behavioral: Negative.    All other systems reviewed and are negative.      PHYSICAL EXAM  ED Triage Vitals   Temp Heart Rate Resp BP SpO2   09/23/18 1635 09/23/18 1635 09/23/18 1635 09/23/18 1716 09/23/18 1635   97.1 °F (36.2 °C) 100 16 104/66 96 %      Temp src Heart Rate Source Patient Position BP Location FiO2 (%)   -- -- 09/23/18 1716 09/23/18 1716 --     Lying Right arm        Physical Exam   Constitutional: He is oriented to person, place, and time. No distress.   HENT:   Head: Normocephalic and atraumatic.   Eyes: Pupils are equal, round, and reactive to light. EOM are normal.   Neck: Normal range of motion. Neck supple.   Cardiovascular: Normal rate, regular rhythm and normal heart sounds.     Pulmonary/Chest: Effort normal. No respiratory distress. He has wheezes (scattered).   Abdominal: Soft. There is no tenderness. There is no rebound and no guarding.   Musculoskeletal: Normal range of motion. He exhibits no edema.   Neurological: He is alert and oriented to person, place, and time. He has normal sensation and normal strength.   Skin: Skin is warm and dry.   Psychiatric: Mood and affect normal.   Nursing note and vitals reviewed.        RADIOLOGY  XR Chest 2 View            CXR- shows pneumothorax within the right hemithorax that is smaller in size when compared to the prior study of 09/21/2018.    I ordered the above noted radiological studies. Interpreted by radiologist. Reviewed by me in PACS.       PROCEDURES  Procedures      PROGRESS AND CONSULTS  ED Course as of Sep 23 1850   Sun Sep 23, 2018   1716 Diagnosed with a pneumothorax on 09/21, changed mind about chest tube. Denies cp and soa.   [KA]   1828 6:28 PM  His PTX is actually improving.  He is in no distress whatsoever - he had a brief episode of pain this morning.  Given this information, the patient elects NOT to pursue tube thoracostomy at this time.    [WC]      ED Course User Index  [KA] Angelique Murillo PA  [WC] Romario Lawton MD     1821  Rechecked pt. Pt is resting comfortably and drinking coffee. Notified pt of improved pneumothorax.  Discussed the plan to discharge pt home and pt to f/u with Dr. Carreno or initiation of radiation into repeat chest x-rays as needed.  He was given instruction on reasons to return, including chest pain or any increasing shortness of breath.  Pt understands and agrees with the plan, all questions answered.    1829  Call placed for thoracic surgery consult    1840  Discussed pt's case with Dr. Cleary who agrees that pt does not need chest tube at this time.       MEDICAL DECISION MAKING  Results were reviewed/discussed with the patient and they were also made aware of online access. Pt also made  aware that some labs, such as cultures, will not be resulted during ER visit and follow up with PMD is necessary.     MDM  Number of Diagnoses or Management Options  Pneumothorax on right:      Amount and/or Complexity of Data Reviewed  Tests in the radiology section of CPT®: reviewed and ordered (CXR- shows pneumothorax within the right hemithorax that is smaller in size when compared to the prior study of 09/21/2018.)  Decide to obtain previous medical records or to obtain history from someone other than the patient: yes  Obtain history from someone other than the patient: yes (Pt's family)  Review and summarize past medical records: yes (Pt was seen on 9/21/18 for pneuothorax and constipation and denied chest tube insertion. )  Discuss the patient with other providers: yes (Dr. John (Consult about pt.))  Independent visualization of images, tracings, or specimens: yes           DIAGNOSIS  Final diagnoses:   Pneumothorax on right       DISPOSITION  DISCHARGE    Patient discharged in stable condition.    Reviewed implications of results, diagnosis, meds, responsibility to follow up, warning signs and symptoms of possible worsening, potential complications and reasons to return to ER.    Patient/Family voiced understanding of above instructions.    Discussed plan for discharge, as there is no emergent indication for admission. Patient referred to primary care provider for BP management due to today's BP. Pt/family is agreeable and understands need for follow up and repeat testing.  Pt is aware that discharge does not mean that nothing is wrong but it indicates no emergency is present that requires admission and they must continue care with follow-up as given below or physician of their choice.     FOLLOW-UP  Jhonathan Rudolph MD PhD  [address] [facility] [address] [phone]    Call in 1 day      [facility]  [address] [facility]  [id number] [phone]    As needed, If symptoms worsen         Medication List      No changes were made to your prescriptions during this visit.           Latest Documented Vital Signs:  As of 6:50 PM  BP- 104/66 HR- 100 Temp- 97.1 °F (36.2 °C) O2 sat- 96%    --  Documentation assistance provided by abhi De León for Dr. Lawton. Information recorded by the scribe was done at my direction and has been verified and validated by me.            Perlita Cleveland  09/23/18 [id number]       Romario Lawton MD  09/23/18 6179

## 2018-09-23 NOTE — ED NOTES
Pt presents to ER stating he has a pneumothorax that was diagnosed 2 days ago and chose not to get a chest tube or stay in the hospital at that time. Pt now states the pain is worse and would like a chest tube. Pt presents in no resp distress, no sub-Q air, trachea is midline.      Flower Carson, RN  09/23/18 2232

## 2018-09-25 NOTE — PROGRESS NOTES
This [facility] INPATIENT PROGRESS NOTE  Subjective     CC: Metastatic non-small cell lung cancer      Interval history:         Beltran Leo is a 63 y.o. male with newly diagnosed metastatic non small cell lung cancer.  He was discharged from the hospital on 09/16/2000 813 and has formal hospital follow-up scheduled  09/25/2018.  Upon discharge he was to meet with hospice to hear more information about services provided.  He also was scheduled to undergo palliative radiation, however, has been unable to undergo treatment because he becomes quite claustrophobic with the required mask.  He called our office reporting that his abdominal/hip/ and chest  pain is not controlled with his current regimen which includes 10 mg hydrocodone every 2 hours as well as OxyContin 20 mg by mouth every 12 hours.  He has not tried alternative medications.  He also complains of significant constipation.  He has not tried laxatives or stool softeners up to this point.  In review of the record September 25 he has been attempting to proceed with radiation therapy to brain but has been unable to tolerate it as result of claustrophobia.  He is now seen with his sister-in-law who is important in his care and who indicates that his pain is not controlled or his anxiety.  Hospice has been discussed with he and his family but he is of the mind to try some type of therapy at this point though he realizes that if he is unable to undergo radiation therapy to the brain and there is little point to systemic chemotherapy otherwise.      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  No additional molecular studies are available as of yet with [facility] assessment still pending.  There is, however, evidence of positive PDL 1 findings of 50% and thus the use of Keytruda as monotherapy initially.           Medications:  The current medication list  was reviewed in the EMR.    Allergies:  No Known Allergies    Review of Systems   Constitutional: Positive for appetite change, fatigue and unexpected weight change (About a 30 pounds in the past 4 months). Negative for chills, diaphoresis and fever.   HENT: Negative for congestion, hearing loss, nosebleeds, sinus pressure and voice change.    Eyes: Negative for visual disturbance.   Respiratory: Positive for cough (No hemoptysis). Negative for shortness of breath.  Stable.  Cardiovascular: Positive for chest pain (Left chest wall). Negative for leg swelling.   Gastrointestinal: See history of present illness.  Endocrine: Negative for heat intolerance.   Genitourinary: Negative for dysuria and frequency.   Musculoskeletal: See history of present illness.  Allergic/Immunologic: Negative for food allergies.   Neurological: Negative for dizziness, seizures and headaches.   Hematological: Negative for adenopathy. Does not bruise/bleed easily.   Psychiatric/Behavioral: Negative for agitation and confusion.       Objective      Vitals:    09/25/18 1433   BP: 100/60   Pulse: 59   Resp: 16   Temp: 98.7 °F (37.1 °C)   SpO2: 97%        Physical exam:   GENERAL:  Well-developed, well-nourished though thin-appearing  male in no acute distress.  Is a strong odor of smoke.  He is accompanied by his sister-in-law.  SKIN:  Warm, dry without rashes, purpura or petechiae.  HEAD:  Normocephalic.  EYES:  Pupils equal, round and reactive to light.  EOMs intact.  Conjunctivae normal.  EARS:  Hearing intact.  NOSE:  Septum midline.  No excoriations or nasal discharge.  MOUTH:  Tongue is well-papillated; no stomatitis or ulcers.  Lips normal.  THROAT:  Oropharynx without lesions or exudates.  NECK:  Supple with good range of motion; no thyromegaly or masses, no JVD.  LYMPHATICS:  No cervical, supraclavicular, axillary or inguinal adenopathy.  CHEST:  Lungs clear to percussion and auscultation. Good airflow.  CARDIAC:  Regular rate  and rhythm without murmurs, rubs or gallops. Normal S1,S2.  ABDOMEN:  Soft, nontender with no organomegaly or masses.  EXTREMITIES:  No clubbing, cyanosis or edema.  NEUROLOGICAL:  Cranial Nerves II-XII grossly intact.  No focal neurological deficits.  PSYCHIATRIC:  Normal affect and mood.        RECENT LABS:      Results from last 7 days  Lab Units 09/25/18  1404 09/21/18  1615 09/20/18  1457   WBC 10*3/mm3 27.00* 25.01* 21.82*   HEMOGLOBIN g/dL 14.8 15.5 14.6   HEMATOCRIT % 41.5 47.8 42.9   PLATELETS 10*3/mm3 345 327 390*       Results from last 7 days  Lab Units 09/21/18  1615 09/20/18  1457   SODIUM mmol/L 129* 130*   POTASSIUM mmol/L 4.5 4.9*   CHLORIDE mmol/L 91* 91*   CO2 mmol/L 23.6 24.1   BUN mg/dL 31* 35*   CREATININE mg/dL 0.68* 0.59*   CALCIUM mg/dL 8.6 8.8   BILIRUBIN mg/dL 0.7 0.7   ALK PHOS U/L 106 107   ALT (SGPT) U/L 24 25   AST (SGOT) U/L 18 15   GLUCOSE mg/dL 120* 131*           Assessment/Plan      1.  Metastatic non-small cell lung carcinoma.  The patient's studies reveal evidence of multiple liver lesions, bilateral adrenal enlargement and displaced fracture left 10th rib.  CT chest again showed a right lower lobe pleural-based necrotic 2.1 cm nodule, right hilar necrotic lymphadenopathy and skeletal survey showed lytic lesions involving the skull, left rib, proximal forearm with bone scan showing additional sixth rib, right trochanteric region, left proximal femur, left acromion, distal right femur, left axilla and right manubrium and finally MRI of the brain showed multiple metastasis in the right cerebellum with moderate surrounding vasogenic edema.  The patient's been referred for radiation therapy but unable to take this more than one time as result again of claustrophobia and apparent agitation while undergoing treatment.  Presented for discussion today that if he is unable to undergo treatment for the brain there is no significant reason to try to have him start systemic therapy.  He  does wish to try systemic therapy and we do find that PD L1 is positive to the point that Keytruda could be used as monotherapy and reduce his general side effect profile.  As result it is agreed that you continue radiation therapy to the brain along with Decadron supportive therapies at least over the next several days and be reviewed next week as to his status once again.  We will make application for a treated and likely proceed with treatment that day if he is able to proceed and tolerate C&S radiation therapy.    2. Cancer related pain.  The patient's medications eventually went on to include oxycodone 10/325 mg every 4-6 hours when necessary, OxyContin 20 mg twice a day and while this is been effective he still is having pain and is often quite sedated.  After further discussion will add Naprosyn at 500 mg by mouth twice a day when he takes his OxyContin to try to gain better control of his pain.    3.  Narcotic induced constipation.  I have asked the patient to continue MiraLAX daily  and Senokot 2 tabs daily.      Addendum the record-B notified of this patient's performance status-ECOG performance status was listed as a 4 September 25.  This is an error with his ECOG performance status a 2.  This is consistent with the patient's physical exam is listed in this note at the time of this service.            Eliot Arriaga MD  9/25/2018  5:58 PM

## 2018-09-25 NOTE — PROGRESS NOTES
Fax rec from covermymeds stating [facility] has started a PA for pts OxyContin 20 mg. I have completed the request and submitted to [place].    Request approved.   I have notified [facility] [phone]

## 2018-09-26 NOTE — TELEPHONE ENCOUNTER
----- Message from Eliot Arriaga MD sent at 9/25/2018  4:59 PM EDT -----  Regarding: RE: APPT. NEXT WEEK  That would be fine.  GEORGIA Huynh  ----- Message -----  From: Carolina Marroquin  Sent: 9/25/2018   3:57 PM  To: Eliot Arriaga MD  Subject: APPT. NEXT WEEK                                  Dr. Arriaga,    I wanted to see when you would prefer to see Mr. Leo next week as you are booked up.  Would you like to see him on Oct. 2 at 2:40Pm?    Thanks!    Carolina

## 2018-09-26 NOTE — TELEPHONE ENCOUNTER
Pt's sister in law called for pt because he needs refills on both Oxycontin and Oxycodone. She said they were told by Dr Arriaga that pt can take 2 Oxycodone 10 mg tabs every 4 hours instead 1 tablet every 6 hours. Also wanted to make Dr Arriaga aware that pt did go to radiation yesterday and today as he has been reluctant to participate. Message sent to Dr Arriaga to clarify dose on Oxycodone before routing the refills to him.

## 2018-09-26 NOTE — TELEPHONE ENCOUNTER
Oxycodone dose verified with Dr Arriaga. Refills for both Oxycodone and Oxycontin routed to Dr Arriaga. Awaiting signature.

## 2018-09-26 NOTE — TELEPHONE ENCOUNTER
----- Message from Pilar Daley sent at 9/26/2018  3:05 PM EDT -----  [phone]    OXO COTTON AND OXYCODONE NEEDS REFILLS  DR. RIBERA HAD CHANGED THE DOSES RECENTLY.  [facility]   AND LET DR. BRUCE KNOW HE DID DO THE RADIATION.

## 2018-09-28 NOTE — TELEPHONE ENCOUNTER
Oncology Social Work    Phone call received from patient's sister in law.  Still needing home health services.  Referral made to [facility] for PT, OT and RN Aide.  Asked [facility] center RN to assist with placing order for Dr. Hawkins.  Patient also in need of BSC.  Faxed detailed written order to [facility].  Equipment to be delivered to patient's brother and sis in law's house.  Dr. Alexander signed order form for BSC.    Thank you,  Komal Wheeler, LIZZETHW, CSW, OSW-C

## 2018-09-28 NOTE — TELEPHONE ENCOUNTER
----- Message from Debra Chirinos sent at 9/28/2018  8:15 AM EDT -----  [phone] sister in law, he stays with  Re:[facility]

## 2018-09-29 PROBLEM — R10.84 GENERALIZED ABDOMINAL PAIN: Status: ACTIVE | Noted: 2018-01-01

## 2018-09-30 PROBLEM — K59.03 DRUG-INDUCED CONSTIPATION: Status: ACTIVE | Noted: 2018-01-01

## 2018-09-30 PROBLEM — G89.3 CANCER ASSOCIATED PAIN: Status: ACTIVE | Noted: 2018-01-01

## 2018-10-01 NOTE — PROGRESS NOTES
Name: Beltran Leo ADMIT: 2018   : 1955  PCP: Provider, No Known    MRN: [id number] LOS: 2 days   AGE/SEX: 63 y.o. male  ROOM: [facility]/   Subjective   Cc- abd pain    Still with abd pain  Moderate  Partially improved with pain meds  On stool softeners  Urinary retention  Dent placed  Agitated at times  Pulled out IV    ROS  No f/c  No n/v  No cp/palp  No soa/cough    Objective   Vital Signs  Temp:  [96 °F (35.6 °C)-97.6 °F (36.4 °C)] 97.2 °F (36.2 °C)  Heart Rate:  [56-78] 72  Resp:  [16-20] 16  BP: (104-113)/(63-80) 105/80  SpO2:  [92 %-99 %] 94 %  on  Flow (L/min):  [3] 3;   Device (Oxygen Therapy): room air  Body mass index is 21.86 kg/m².    Physical Exam   Constitutional: He appears distressed (acutely and chronically ill).   HENT:   Head: Normocephalic and atraumatic.   Mouth/Throat: Oropharynx is clear and moist.   Eyes: Conjunctivae are normal. No scleral icterus.   Neck: Normal range of motion. Neck supple.   Cardiovascular: Normal rate and normal heart sounds.    No murmur heard.  Pulmonary/Chest: Effort normal and breath sounds normal. No respiratory distress.   Abdominal: Soft. Bowel sounds are normal. He exhibits no distension. There is tenderness (generalized). There is no guarding.   Genitourinary:   Genitourinary Comments: Deferred    Musculoskeletal: He exhibits no edema or deformity.   Neurological:   Awake, drowisy   Skin: Skin is warm and dry. He is not diaphoretic.   Psychiatric: He has a normal mood and affect. His behavior is normal.   Nursing note and vitals reviewed.      Results Review:       I reviewed the patient's new clinical results.    Results from last 7 days  Lab Units 18  0825 18  0327 18  0004 18  1404   WBC 10*3/mm3 17.75* 20.85* 25.22* 27.00*   HEMOGLOBIN g/dL 12.0* 13.5* 13.6* 14.8   PLATELETS 10*3/mm3 160 204 233 345       Results from last 7 days  Lab Units 18  0825 18  0327 18  0004   SODIUM mmol/L 134* 132* 130*    POTASSIUM mmol/L 4.9 4.7 4.8   CHLORIDE mmol/L 101 97* 93*   CO2 mmol/L 20.9* 19.8* 25.1   BUN mg/dL 23 37* 39*   CREATININE mg/dL 0.40* 0.65* 0.80   GLUCOSE mg/dL 110* 103* 107*   Estimated Creatinine Clearance: 149.7 mL/min (A) (by C-G formula based on SCr of 0.4 mg/dL (L)).    Results from last 7 days  Lab Units 09/30/18  0825 09/29/18  0327 09/29/18  0004   CALCIUM mg/dL 7.8* 8.2* 8.5*   ALBUMIN g/dL 2.50*  --  2.90*     XR Chest 1 View [[id number]] Favio as Reviewed   Order Status: Completed Collected: 09/29/18 0202    Updated: 09/29/18 0206   Narrative:     PORTABLE CHEST X-RAY     CLINICAL HISTORY: hypotension and leukocytosis; R10.84-Generalized  abdominal pain; I48.91-Unspecified atrial fibrillation;  I95.9-Hypotension, unspecified     COMPARISON: 9/23/2018.     FINDINGS: Portable AP view of the chest was obtained with overlying  monitor leads in place. The lungs are under aerated with mild right lung  base atelectasis. There is underlying emphysema and fibrosis. There is a  dominant mass in the right lung base laterally in this patient with  known pulmonary malignancy. Right hilar enlargement corresponds to known  adenopathy as well. Subcentimeter nodules in the left lung noted on  previous CT are not well seen by portable chest radiography. There is no  edema or significant pleural fluid. Normal heart size.            Impression:     1. Emphysema with under aeration and mild right lung base atelectasis.  2. Known malignancy findings are not as well-seen as on previous CT        This report was finalized on 9/29/2018 2:03 AM by Karri Shabazz M.D.      CT Abdomen Pelvis Without Contrast [[id number]] Favio as Reviewed   Order Status: Completed Collected: 09/29/18 0043    Updated: 09/29/18 0053   Narrative:     NONCONTRAST CT SCANS ABDOMEN AND PELVIS     HISTORY: abdominal pain, metastatic lung cancer     COMPARISON: August 30, 2018.     TECHNIQUE:Radiation dose reduction techniques were utilized,  including  automated exposure control and exposure modulation based on body size.   Axial images were obtained from the lung bases to the symphysis pubis  without oral or IV contrast per request.      FINDINGS:  Multiple basilar pulmonary lesions are again seen along with  partial visualization of a right infrahilar mass and probable  postobstructive atelectasis of the right middle lobe. The dominant mass  in the right lower lobe has increase now measuring 2.5 cm, previously  2.0 cm. Also noted are multiple low-density liver lesions which are not  as well seen as on the previous contrasted exam but likely reflect  metastatic foci. These appear to have progressed. For example one of the  larger lesions posteriorly in the right hepatic lobe now measures 1.9  cm, previously 1.3 cm. Ovoid soft tissue nodule beneath the left  hemidiaphragm has increased now measuring 2.7 cm, previously 2.0. This  may reflect adenopathy or peritoneal implant.     There are low-density renal lesions which are also not as well seen but  favored to reflect cysts. There is an enlarging right adrenal metastasis  now measuring 4.2 x 2.6 cm, previously 3.2 x 1.9 cm. Enlarged left  adrenal gland appears stable. Remaining solid organs are otherwise  unremarkable without benefit of IV contrast. Encompassed aorta is  non-aneurysmal. Moderate constipation. The GI tract not opacified for  assessment but non obstructive in appearance. The appendix is unable to  be visualized for evaluation on this exam without oral or IV contrast.  There is a small amount of ascites present.      Known left 10th rib fracture laterally does not show any significant  osseous union. There is persistent swelling of the adjacent muscular  tissues which now appear more rounded and mass like and therefore likely  metastatic in nature with pathologic fracture.         Impression:     1.  Progression of metastatic disease.  2. Stable renal lesions, likely cysts.  3. Moderate  constipation without obstruction.  4. Small amount of ascites               dexamethasone 4 mg Oral Q6H   diltiaZEM  mg Oral Daily   famotidine 20 mg Oral BID   FLUoxetine 20 mg Oral Daily   levETIRAcetam 500 mg Oral Q12H   metoprolol succinate XL 12.5 mg Oral Daily   oxyCODONE ER 20 mg Oral Q12H   pantoprazole 40 mg Oral Daily   polyethylene glycol 17 g Oral Daily   sennosides-docusate sodium 2 tablet Oral BID   cyanocobalamin 1,000 mcg Oral Daily       sodium chloride 75 mL/hr Last Rate: 75 mL/hr (09/30/18 1740)   Diet Clear Liquid      Assessment/Plan      Active Hospital Problems    Diagnosis Date Noted   • **Cancer associated pain [G89.3] 09/30/2018   • Drug-induced constipation [K59.03] 09/30/2018   • Generalized abdominal pain [R10.84] 09/29/2018   • Secondary cancer of brain (CMS/HCC) [C79.31] 09/11/2018   • Metastasis to bone (CMS/HCC) [C79.51] 09/10/2018   • Atrial flutter with rapid ventricular response (CMS/HCC) [I48.92] 09/10/2018   • Non-small cell lung cancer (NSCLC) (CMS/HCC) [C34.90] 08/31/2018      Resolved Hospital Problems    Diagnosis Date Noted Date Resolved   No resolved problems to display.       Mr. Leo is a 63 y.o. male with a history of Metastatic NSCLCa who has been admitted with Afib with RVR, abdominal pain, constipation    - Abdominal Pain: Constipation present on CT with metastatic cancer. No obstruction seen.  Scheduled miralax and senna/docusate. PRN bisacodyl. Pain related to metastatic disease including liver mets as well. PO and IV narcotics for cancer pain. Add some PRN ativan for agitation/restlessness    - AFib/RVR: Now back in sinus. PO diltiazem and metoprolol.     - Metastatic NSCLC: Continue decadron/keppra for brain metastasis. Oncology following.    - Leukocytosis: Persistent and similar to previous admission. Chronic steroid.    - Hyponatremia: Mild and improved  - Lactic Acidosis: Resolved.  - Dent for urinary retention    -SCDs for dvt proph. Stop lovenox  with brain meds unless Oncology recommended    He looks terrible. He seems unlikely to be able to tolerate any treatment. He is still full code. Too drowsy to talk about code status currently but will see if Palliative Care nurse can arrange a meeting to discuss goals of care.       EMANI/W RN  Reviewed previous records    Edmar Drummond MD  [facility]  10/01/18  7:57 AM

## 2018-10-01 NOTE — CONSULTS
Purpose of the visit was to evaluate for: goals of care/advanced care planning, support for patient/family, hospice referral/discussion, pain/symptom management, transfer to comfort care bed/unit and comfort care. Spoke with RN as well as family and discussed palliative care, goals of care, care options, [facility] scattered bed status.      Assessment:  Patient is palliative care appropriate for inpatient care given metastatic cancer, lung, bone, brain, fracture rib, very anxious and in pain, restless. PPS 30%.     Recommendations/Plan: transfer to [facility] for palliative care.    Other Comments: Talked with brotherManolo and sister, Genoveva at the bedside. They both agree to focus on comfort care because he looks in pain. Patient is saying he just wants to lay down despite laying down. The patient has no children, but does have a exwife and step daughter. The brother and sister are next of kin. [facility] is to meet with family later about scattered bed. Called Dr. Drummond for transfer orders.     Total time: 15 minutes.

## 2018-10-01 NOTE — PLAN OF CARE
Problem: Patient Care Overview  Goal: Plan of Care Review  Outcome: Ongoing (interventions implemented as appropriate)   10/01/18 0428   Coping/Psychosocial   Plan of Care Reviewed With patient   Plan of Care Review   Progress no change   OTHER   Outcome Summary New diagnosis, didnt tolerate 1st round of chemo, sent to 4N w A-fib, transfer to 3P, IVF, urinary retention=F/C placed, very weak, in severe pain, confused at times, VSS, will continue to monitor       Problem: Fall Risk (Adult)  Goal: Absence of Fall  Outcome: Ongoing (interventions implemented as appropriate)      Problem: Skin Injury Risk (Adult)  Goal: Skin Health and Integrity  Outcome: Ongoing (interventions implemented as appropriate)      Problem: Pain, Chronic (Adult)  Goal: Acceptable Pain/Comfort Level and Functional Ability  Outcome: Ongoing (interventions implemented as appropriate)

## 2018-10-01 NOTE — PROGRESS NOTES
[facility] GROUP INPATIENT PROGRESS NOTE    Length of Stay:  2 days    CHIEF COMPLAINT/REASON FOR VISIT:  1.  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  PD-L1 >50%.  The patient's studies reveal evidence of multiple liver lesions, bilateral adrenal enlargement and displaced fracture left 10th rib.  CT chest again showed a right lower lobe pleural-based necrotic 2.1 cm nodule, right hilar necrotic lymphadenopathy and skeletal survey showed lytic lesions involving the skull, left rib, proximal forearm with bone scan showing additional sixth rib, right trochanteric region, left proximal femur, left acromion, distal right femur, left axilla and right manubrium and finally MRI of the brain showed multiple metastasis in the right cerebellum with moderate surrounding vasogenic edema.  Unable to tolerate WBRT     2.  Admitted with abdominal pain and constipation.  3.  Afib/RVR on admisison, on telemetry    SUBJECTIVE:  Urinary retention o/n requiring herrera catheter.  Remains constipated with no BM in spite of bowel regimen.  No tachycardia overnight.  Very restless and in obvious pain this morning.    ROS:  Positive for - urinary retention, constipation  Negative for - fever/chills, tachycardia    OBJECTIVE:  Vitals:    10/01/18 0240 10/01/18 0444 10/01/18 0514 10/01/18 0751   BP:  110/67 104/63 105/80   BP Location:  Left arm Left arm    Patient Position:  Lying Lying    Pulse:  78 69 72   Resp:  16 16 16   Temp: 96 °F (35.6 °C) 96.2 °F (35.7 °C) 97.5 °F (36.4 °C) 97.2 °F (36.2 °C)   TempSrc:  Oral Oral Oral   SpO2:  99% 99% 94%   Weight:       Height:             PHYSICAL EXAMINATION:  General:  NAD, chronically ill appearing, appears uncomfortable.  Chest/Lungs:  CTAB  Heart:  RRR no m/r/g  Abdomen/GI:  Soft, diffusely TTP, no R/G, NABS  Extremities:  WWP, no edema  :  Herrera catheter in place    DIAGNOSTIC DATA:  Results Review:     I reviewed the patient's  new clinical results.      Results from last 7 days  Lab Units 09/30/18  0825   WBC 10*3/mm3 17.75*   HEMOGLOBIN g/dL 12.0*   HEMATOCRIT % 37.8*   PLATELETS 10*3/mm3 160     Lab Results   Component Value Date    NEUTROABS 16.91 (H) 09/30/2018       Results from last 7 days  Lab Units 09/30/18  0825   SODIUM mmol/L 134*   POTASSIUM mmol/L 4.9   CHLORIDE mmol/L 101   CO2 mmol/L 20.9*   BUN mg/dL 23   CREATININE mg/dL 0.40*   GLUCOSE mg/dL 110*   CALCIUM mg/dL 7.8*       Results from last 7 days  Lab Units 09/30/18  0457 09/29/18  0004   INR  1.17* 1.08               IMAGING:    CT A/P 9/29:       IMPRESSION:  1.  Progression of metastatic disease.  2. Stable renal lesions, likely cysts.  3. Moderate constipation without obstruction.  4. Small amount of ascites    Assessment/Plan   ASSESSMENT/PLAN:  This is a 63 y.o. male with:     1.  Metastatic non-small cell lung carcinoma.  The patient's studies reveal evidence of multiple liver lesions, bilateral adrenal enlargement and displaced fracture left 10th rib.  CT chest again showed a right lower lobe pleural-based necrotic 2.1 cm nodule, right hilar necrotic lymphadenopathy and skeletal survey showed lytic lesions involving the skull, left rib, proximal forearm with bone scan showing additional sixth rib, right trochanteric region, left proximal femur, left acromion, distal right femur, left axilla and right manubrium and finally MRI of the brain showed multiple metastasis in the right cerebellum with moderate surrounding vasogenic edema.  The patient's been referred for radiation therapy but unable to tolerate treatment due to his claustrophobia and discomfort.  He has missed several treatments and still has 6 treatments remaining.   He has completed 4 out of ten WBRT.         2. Cancer related pain.  The patient's medications eventually went on to include oxycodone 10/325 mg every 4-6 hours when necessary, OxyContin 20 mg twice a day and while this is been effective  he still is having pain and is often quite sedated. Added Naprosyn at 500 mg by mouth twice a day when he takes his OxyContin to try to gain better control of his pain.     3.  Narcotic induced constipation. Continue aggressive bowel regimen.       4.  Afib/RVR:  Controlled now.      5.  Urinary retention requiring herrera catheter     RECOMMENDATIONS/PLAN:   1.   We discussed the overall situation and goals of treatment with the patient.  Realistically I don't think he is going to get to the point where treatment with Keytruda would make sense.  He is very uncomfortable and expresses that he would mainly like to do whatever it takes to get more comfortable.  We offered to switch to a palliative care approach and to forego any additional radiation treatments or chemotherapy.  He is in agreement with this approach and also has agreed to a no code.  Palliative care consult has already been requested by [facility].  We will also request a hospice consult as he may be appropriate for a hospice scattered bed.  2.  We will change back to IV Decadron 4 mg IV every 8 hours to see if this will improve his comfort level of functioning.  3.  We also will escalate his when necessary IV Dilaudid to 2 mg IV every 2 hours as needed for comfort.  4.  DNR/comfort measures.  5.  I discussed the situation by phone with his brother.  He also is in agreement with this approach.          Angel Carreno MD

## 2018-10-01 NOTE — PLAN OF CARE
Problem: Patient Care Overview  Goal: Plan of Care Review  Outcome: Ongoing (interventions implemented as appropriate)   10/01/18 0873   Coping/Psychosocial   Plan of Care Reviewed With patient   Plan of Care Review   Progress no change   OTHER   Outcome Summary received pt to floor, pt not awake at this time. Family states will be in to see this afternoon, premedicating for turns, will monitor for comfort.      Goal: Individualization and Mutuality  Outcome: Ongoing (interventions implemented as appropriate)      Problem: Fall Risk (Adult)  Goal: Absence of Fall  Outcome: Ongoing (interventions implemented as appropriate)      Problem: Skin Injury Risk (Adult)  Goal: Skin Health and Integrity  Outcome: Ongoing (interventions implemented as appropriate)      Problem: Pain, Chronic (Adult)  Goal: Acceptable Pain/Comfort Level and Functional Ability  Outcome: Ongoing (interventions implemented as appropriate)      Problem: Palliative Care (Adult)  Goal: Identify Related Risk Factors and Signs and Symptoms  Outcome: Outcome(s) achieved Date Met: 10/01/18    Goal: Maximized Comfort  Outcome: Ongoing (interventions implemented as appropriate)    Goal: Enhanced Quality of Life  Outcome: Ongoing (interventions implemented as appropriate)

## 2018-10-01 NOTE — NURSING NOTE
Pt noted with significant decline in last 24 hours. Pain and terminal restlessness noted. Managed with IV Dilaudid and ativan. Pt not a candidate for further tx. Palliative and [facility] consult ordered. Pt to be moved to  for paliative care. Pt brother and sister here for visit and spoke with palliative and [facility] by phone.

## 2018-10-02 NOTE — PROGRESS NOTES
[facility] GROUP INPATIENT PROGRESS NOTE    Length of Stay:  3 days    CHIEF COMPLAINT/REASON FOR VISIT:  1.  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  PD-L1 >50%.  The patient's studies reveal evidence of multiple liver lesions, bilateral adrenal enlargement and displaced fracture left 10th rib.  CT chest again showed a right lower lobe pleural-based necrotic 2.1 cm nodule, right hilar necrotic lymphadenopathy and skeletal survey showed lytic lesions involving the skull, left rib, proximal forearm with bone scan showing additional sixth rib, right trochanteric region, left proximal femur, left acromion, distal right femur, left axilla and right manubrium and finally MRI of the brain showed multiple metastasis in the right cerebellum with moderate surrounding vasogenic edema.  Unable to tolerate WBRT     Patient now on palliative care unit on [facility] scattered bed. Seems to be resting comfortably this am.      ROS:  Positive for - urinary retention, constipation  Negative for - fever/chills, tachycardia    OBJECTIVE:  Vitals:    10/01/18 0514 10/01/18 0751 10/01/18 1528 10/02/18 0511   BP: 104/63 105/80 105/65 118/79   BP Location: Left arm  Right arm Right arm   Patient Position: Lying  Lying Lying   Pulse: 69 72 80 96   Resp: 16 16 12 14   Temp: 97.5 °F (36.4 °C) 97.2 °F (36.2 °C) 98.5 °F (36.9 °C) 98.4 °F (36.9 °C)   TempSrc: Oral Oral Oral Oral   SpO2: 99% 94% 93% 95%   Weight:       Height:             PHYSICAL EXAMINATION:  General:  NAD, chronically ill appearing, appears uncomfortable.  Chest/Lungs:  CTAB  Heart:  RRR no m/r/g  Abdomen/GI:  Soft, diffusely TTP, no R/G, NABS  Extremities:  WWP, no edema  :  Dent catheter in place    DIAGNOSTIC DATA:  Results Review:     I reviewed the patient's new clinical results.      Results from last 7 days  Lab Units 09/30/18  0825   WBC 10*3/mm3 17.75*   HEMOGLOBIN g/dL 12.0*   HEMATOCRIT % 37.8*    PLATELETS 10*3/mm3 160     Lab Results   Component Value Date    NEUTROABS 16.91 (H) 09/30/2018       Results from last 7 days  Lab Units 09/30/18  0825   SODIUM mmol/L 134*   POTASSIUM mmol/L 4.9   CHLORIDE mmol/L 101   CO2 mmol/L 20.9*   BUN mg/dL 23   CREATININE mg/dL 0.40*   GLUCOSE mg/dL 110*   CALCIUM mg/dL 7.8*       Results from last 7 days  Lab Units 09/30/18  0457 09/29/18  0004   INR  1.17* 1.08           IMAGING:    CT A/P 9/29:       IMPRESSION:  1.  Progression of metastatic disease.  2. Stable renal lesions, likely cysts.  3. Moderate constipation without obstruction.  4. Small amount of ascites    Assessment/Plan   ASSESSMENT/PLAN:  This is a 63 y.o. male with:     1.  Metastatic non-small cell lung carcinoma.  The patient's studies reveal evidence of multiple liver lesions, bilateral adrenal enlargement and displaced fracture left 10th rib.  CT chest again showed a right lower lobe pleural-based necrotic 2.1 cm nodule, right hilar necrotic lymphadenopathy and skeletal survey showed lytic lesions involving the skull, left rib, proximal forearm with bone scan showing additional sixth rib, right trochanteric region, left proximal femur, left acromion, distal right femur, left axilla and right manubrium and finally MRI of the brain showed multiple metastasis in the right cerebellum with moderate surrounding vasogenic edema.  The patient's been referred for radiation therapy but unable to tolerate treatment due to his claustrophobia and discomfort.  He has missed several treatments and still has 6 treatments remaining.   He has completed 4 out of ten WBRT.  Discussions were held with the patient and his family and they have elected to pursue palliative care and focus on comfort during the terminal phase of his illness.       2. Cancer related pain.  Patient seems much more comfortable currently with IV Dilaudid and Ativan as needed.     3. Urinary retention requiring herrera  catheter     RECOMMENDATIONS/PLAN:   1.   we totally agree with the transition to palliative care on a hospice scattered bed.  Patient seems much more comfortable today.  2.  We will follow along peripherally and help out in any way we can with maintaining his comfort during the terminal phase of his illness.        Angel Carreno MD

## 2018-10-02 NOTE — PLAN OF CARE
Problem: Patient Care Overview  Goal: Plan of Care Review  Outcome: Ongoing (interventions implemented as appropriate)   10/02/18 3070   Coping/Psychosocial   Plan of Care Reviewed With patient;family   Plan of Care Review   Progress declining   OTHER   Outcome Summary Pt had episode of pain, well relieved with meds, premedicated for turns, hospice here to see, will meet with family again at 17:30 tomorrow possibly flip to [facility] tomorrow. will continue to monitor for comfort      Goal: Individualization and Mutuality  Outcome: Ongoing (interventions implemented as appropriate)      Problem: Fall Risk (Adult)  Goal: Absence of Fall  Outcome: Ongoing (interventions implemented as appropriate)      Problem: Skin Injury Risk (Adult)  Goal: Skin Health and Integrity  Outcome: Ongoing (interventions implemented as appropriate)      Problem: Palliative Care (Adult)  Goal: Maximized Comfort  Outcome: Ongoing (interventions implemented as appropriate)    Goal: Enhanced Quality of Life  Outcome: Ongoing (interventions implemented as appropriate)

## 2018-10-02 NOTE — PROGRESS NOTES
Discharge Planning Assessment  [facility]     Patient Name: Beltran Leo  MRN: [id number]  Today's Date: 10/2/2018    Admit Date: 9/28/2018          Discharge Needs Assessment    No documentation.             Discharge Plan     Row Name 10/02/18 1228       Plan    Plan Comments The patient transferred to [facility] from [facility] on 10/1/18 @ 14:55. The patient is palliative. [facility] to evaluate today at 17:30. CCP will follow for any needs that may arise. LAIVIA Mishra RN, CCP.         Destination     No service coordination in this encounter.      Durable Medical Equipment     No service coordination in this encounter.      Dialysis/Infusion     No service coordination in this encounter.      Home Medical Care     No service coordination in this encounter.      Social Care     No service coordination in this encounter.                Demographic Summary    No documentation.           Functional Status    No documentation.           Psychosocial    No documentation.           Abuse/Neglect    No documentation.           Legal    No documentation.           Substance Abuse    No documentation.           Patient Forms    No documentation.         Kenia Mishra, RN

## 2018-10-02 NOTE — PROGRESS NOTES
Discharge Planning Assessment  [facility]     Patient Name: Beltran Leo  MRN: [id number]  Today's Date: 10/2/2018    Admit Date: 9/28/2018          Discharge Needs Assessment    No documentation.             Discharge Plan     Row Name 10/02/18 1907       Plan    Plan Comments Ralf met with the family today and they would like to schedule a meeting for 10/3/18 @ 17:30 where all five of the family members can be here. Sunny scheduled the appointment and will re-meet with all the family tomorrow. ALIVIA Mishra RN, CCP.         Destination     No service coordination in this encounter.      Durable Medical Equipment     No service coordination in this encounter.      Dialysis/Infusion     No service coordination in this encounter.      Home Medical Care     No service coordination in this encounter.      Social Care     No service coordination in this encounter.        Expected Discharge Date and Time     Expected Discharge Date Expected Discharge Time    Oct 2, 2018               Demographic Summary    No documentation.           Functional Status    No documentation.           Psychosocial    No documentation.           Abuse/Neglect    No documentation.           Legal    No documentation.           Substance Abuse    No documentation.           Patient Forms    No documentation.         Kenia Mishra RN

## 2018-10-02 NOTE — CONSULTS
Arrived on unit, reviewed records and faxed to [facility]. Spoke with CLARA Barrientos to discuss patient's disease progression. Met with two of patient's adult siblings to discuss patient's disease progression and goals of care. Provided detailed EOS. Family members present stated they were agreeable to [facility] scattered bed admission, however patient has four other siblings. Spoke with [facility] manager on call who stated majority of the siblings must agree on patient having [facility] services in order to obtain consents and move forward with [facility] scattered bed admission. Informed family I could speak with other siblings over the phone tonight, however they requested that patient's local siblings come to [facility] tomorrow to discuss [facility] scattered bed admission. Patient placed on my schedule tomorrow at 1730 per family request, to complete [facility] scattered bed admission. Updated CLARA Barrientos and Neela WINN.    Thanks for the referral and opportunity to care for this patient.    Sunny Reese RN, BSN  Referral and admission coordinator  [phone]

## 2018-10-02 NOTE — DISCHARGE SUMMARY
NAME: Beltran Leo ADMIT: 2018   : 1955  PCP: Provider, No Known    MRN: [id number] LOS: 3 days   AGE/SEX: 63 y.o. male  ROOM: [facility]/     Date of Admission:  2018  Date of Discharge:  10/2/2018    PCP: Provider, No Known    CHIEF COMPLAINT  Abdominal Pain and Rapid Heart Rate      DISCHARGE DIAGNOSIS  Active Hospital Problems    Diagnosis Date Noted   • **Cancer associated pain [G89.3] 2018   • Drug-induced constipation [K59.03] 2018   • Generalized abdominal pain [R10.84] 2018   • Secondary cancer of brain (CMS/HCC) [C79.31] 2018   • Metastasis to bone (CMS/HCC) [C79.51] 09/10/2018   • Atrial flutter with rapid ventricular response (CMS/HCC) [I48.92] 09/10/2018   • Non-small cell lung cancer (NSCLC) (CMS/HCC) [C34.90] 2018      Resolved Hospital Problems    Diagnosis Date Noted Date Resolved   No resolved problems to display.       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 Miller/Dr. Carreno- Oncology  Palliative Care  [facility] COURSE  Mr. Leo is a 63 y.o. male with a history of Metastatic Non small cell lung cancer who has been admitted with Afib with RVR, abdominal pain, constipation.    His abdominal pain has been severe. Has metastatic NSCLC. Mets to bone, brain, liver and other areas. Given bowel agents for constipation. He was needing a high amount of narcotics for pain and ativan for restlessness. He appears to be dying and is unable to tolerate any cancer treatment. Discussion between the patient, family, Oncology, and Palliative teams decided to move to the 4 Palliative Care floor for symptom management. Plan for discharge from acute hospitalization and [facility]        DIAGNOSTICS    Comprehensive Metabolic Panel [[id number]]  (Abnormal) Collected: 09/30/18 0825   Lab Status: Final result Specimen: Blood Updated: 09/30/18 0906    Glucose 110 (H) mg/dL     BUN 23 mg/dL     Creatinine 0.40 (L) mg/dL     Sodium 134 (L) mmol/L     Potassium 4.9 mmol/L     Chloride 101 mmol/L     CO2 20.9 (L) mmol/L     Calcium 7.8 (L) mg/dL     Total Protein 5.1 (L) g/dL     Albumin 2.50 (L) g/dL     ALT (SGPT) 19 U/L     AST (SGOT) 17 U/L     Alkaline Phosphatase 113 U/L     Total Bilirubin 0.9 mg/dL     eGFR Non African Amer >150 mL/min/1.73     Globulin 2.6 gm/dL     A/G Ratio 1.0 g/dL     BUN/Creatinine Ratio 57.5 (H)    Anion Gap 12.1 mmol/L    Lipase [[id number]] (Abnormal) Collected: 09/30/18 0825   Lab Status: Final result Specimen: Blood Updated: 09/30/18 0906    Lipase 100 (H) U/L    CBC Auto Differential [[id number]] (Abnormal) Collected: 09/30/18 0825   Lab Status: Final result Specimen: Blood Updated: 09/30/18 0851    WBC 17.75 (H) 10*3/mm3     RBC 4.08 (L) 10*6/mm3     Hemoglobin 12.0 (L) g/dL     Hematocrit 37.8 (L) %     MCV 92.6 fL     MCH 29.4 pg     MCHC 31.7 (L) g/dL     RDW 13.3 %     RDW-SD 44.8 fl     MPV 11.1 fL     Platelets 160 10*3/mm3     Neutrophil % 95.3 (H) %     Lymphocyte % 1.8 (L) %      CT Abdomen Pelvis Without Contrast [[id number]] Favio as Reviewed   Order Status: Completed Collected: 09/29/18 0043    Updated: 09/29/18 0053   Narrative:     NONCONTRAST CT SCANS ABDOMEN AND PELVIS     HISTORY: abdominal pain, metastatic lung cancer     COMPARISON: August 30, 2018.     TECHNIQUE:Radiation dose reduction techniques were utilized, including  automated exposure control and exposure modulation based on body size.   Axial images were obtained from the lung bases to the symphysis pubis  without oral or IV contrast per request.      FINDINGS:  Multiple basilar pulmonary lesions are again seen along with  partial visualization of a right infrahilar mass and probable  postobstructive atelectasis of the right middle lobe. The dominant  mass  in the right lower lobe has increase now measuring 2.5 cm, previously  2.0 cm. Also noted are multiple low-density liver lesions which are not  as well seen as on the previous contrasted exam but likely reflect  metastatic foci. These appear to have progressed. For example one of the  larger lesions posteriorly in the right hepatic lobe now measures 1.9  cm, previously 1.3 cm. Ovoid soft tissue nodule beneath the left  hemidiaphragm has increased now measuring 2.7 cm, previously 2.0. This  may reflect adenopathy or peritoneal implant.     There are low-density renal lesions which are also not as well seen but  favored to reflect cysts. There is an enlarging right adrenal metastasis  now measuring 4.2 x 2.6 cm, previously 3.2 x 1.9 cm. Enlarged left  adrenal gland appears stable. Remaining solid organs are otherwise  unremarkable without benefit of IV contrast. Encompassed aorta is  non-aneurysmal. Moderate constipation. The GI tract not opacified for  assessment but non obstructive in appearance. The appendix is unable to  be visualized for evaluation on this exam without oral or IV contrast.  There is a small amount of ascites present.      Known left 10th rib fracture laterally does not show any significant  osseous union. There is persistent swelling of the adjacent muscular  tissues which now appear more rounded and mass like and therefore likely  metastatic in nature with pathologic fracture.         Impression:     1.  Progression of metastatic disease.  2. Stable renal lesions, likely cysts.  3. Moderate constipation without obstruction.  4. Small amount of ascites               PHYSICAL EXAM  Objective     8AM on 10/2/18    Physical Exam   Constitutional: He appears distressed (acutely and chronically ill). A little restless  HENT:   Head: Normocephalic and atraumatic.   Mouth/Throat: Oropharynx is clear and moist.   Eyes: Conjunctivae are normal. No scleral icterus.   Neck: Normal range of motion.  Neck supple.   Cardiovascular: Normal rate and normal heart sounds.    No murmur heard.  Pulmonary/Chest: Effort normal and breath sounds normal. No respiratory distress.   Abdominal: Soft. Bowel sounds are normal. He exhibits no distension. There is tenderness (generalized). There is no guarding.   Genitourinary:   Genitourinary Comments: +herrera   Musculoskeletal: He exhibits no edema or deformity.   Neurological:   drowisy   Skin: Skin is warm and dry. He is not diaphoretic.   Psychiatric: He has a normal mood and affect. His behavior is normal.   Nursing note and vitals reviewed.    CONDITION ON DISCHARGE  Stable.      DISCHARGE DISPOSITION   [facility]      Edmar Drummond MD  [facility]  10/02/18  8:35 AM      Time: greater than 32 minutes on discharge  It was a pleasure taking care of this patient while in the hospital.

## 2018-10-02 NOTE — PROGRESS NOTES
Name: Beltran Leo ADMIT: 2018   : 1955  PCP: Provider, No Known    MRN: [id number] LOS: 3 days   AGE/SEX: 63 y.o. male  ROOM: [facility]   Subjective   Cc- abd pain    Still with abd pain  Moderate-severe  Partially improved with pain meds  On stool softeners  Urinary retention  Dent placed  Agitated at times    Transferred to  palliative  comfort measures only    ROS  No f/c  No n/v  No cp/palp  No soa/cough    Objective   Vital Signs  Temp:  [98.4 °F (36.9 °C)-98.6 °F (37 °C)] 98.6 °F (37 °C)  Heart Rate:  [] 100  Resp:  [14-16] 16  BP: (118)/(79) 118/79  SpO2:  [93 %-95 %] 93 %  on   ;   Device (Oxygen Therapy): room air  Body mass index is 21.86 kg/m².    Physical Exam   Constitutional: He appears distressed (acutely and chronically ill).   HENT:   Head: Normocephalic and atraumatic.   Mouth/Throat: Oropharynx is clear and moist.   Eyes: Conjunctivae are normal. No scleral icterus.   Neck: Normal range of motion. Neck supple.   Cardiovascular: Normal rate and normal heart sounds.    No murmur heard.  Pulmonary/Chest: Effort normal and breath sounds normal. No respiratory distress.   Abdominal: Soft. Bowel sounds are normal. He exhibits no distension. There is tenderness (generalized). There is no guarding.   Genitourinary:   Genitourinary Comments: Deferred    Musculoskeletal: He exhibits no edema or deformity.   Neurological:   Awake, drowisy   Skin: Skin is warm and dry. He is not diaphoretic.   Psychiatric: He has a normal mood and affect. His behavior is normal.   Nursing note and vitals reviewed.      Results Review:       I reviewed the patient's new clinical results.    Results from last 7 days  Lab Units 18  0818  0004   WBC 10*3/mm3 17.75* 20.85* 25.22*   HEMOGLOBIN g/dL 12.0* 13.5* 13.6*   PLATELETS 10*3/mm3 160 204 233       Results from last 7 days  Lab Units 18  08187 18  0004   SODIUM mmol/L 134* 132* 130*   POTASSIUM  mmol/L 4.9 4.7 4.8   CHLORIDE mmol/L 101 97* 93*   CO2 mmol/L 20.9* 19.8* 25.1   BUN mg/dL 23 37* 39*   CREATININE mg/dL 0.40* 0.65* 0.80   GLUCOSE mg/dL 110* 103* 107*   Estimated Creatinine Clearance: 149.7 mL/min (A) (by C-G formula based on SCr of 0.4 mg/dL (L)).    Results from last 7 days  Lab Units 09/30/18  0825 09/29/18  0327 09/29/18  0004   CALCIUM mg/dL 7.8* 8.2* 8.5*   ALBUMIN g/dL 2.50*  --  2.90*     XR Chest 1 View [[id number]] Favio as Reviewed   Order Status: Completed Collected: 09/29/18 0202    Updated: 09/29/18 0206   Narrative:     PORTABLE CHEST X-RAY     CLINICAL HISTORY: hypotension and leukocytosis; R10.84-Generalized  abdominal pain; I48.91-Unspecified atrial fibrillation;  I95.9-Hypotension, unspecified     COMPARISON: 9/23/2018.     FINDINGS: Portable AP view of the chest was obtained with overlying  monitor leads in place. The lungs are under aerated with mild right lung  base atelectasis. There is underlying emphysema and fibrosis. There is a  dominant mass in the right lung base laterally in this patient with  known pulmonary malignancy. Right hilar enlargement corresponds to known  adenopathy as well. Subcentimeter nodules in the left lung noted on  previous CT are not well seen by portable chest radiography. There is no  edema or significant pleural fluid. Normal heart size.            Impression:     1. Emphysema with under aeration and mild right lung base atelectasis.  2. Known malignancy findings are not as well-seen as on previous CT        This report was finalized on 9/29/2018 2:03 AM by Karri Shabazz M.D.      CT Abdomen Pelvis Without Contrast [[id number]] Favio as Reviewed   Order Status: Completed Collected: 09/29/18 0043    Updated: 09/29/18 0053   Narrative:     NONCONTRAST CT SCANS ABDOMEN AND PELVIS     HISTORY: abdominal pain, metastatic lung cancer     COMPARISON: August 30, 2018.     TECHNIQUE:Radiation dose reduction techniques were utilized, including  automated  exposure control and exposure modulation based on body size.   Axial images were obtained from the lung bases to the symphysis pubis  without oral or IV contrast per request.      FINDINGS:  Multiple basilar pulmonary lesions are again seen along with  partial visualization of a right infrahilar mass and probable  postobstructive atelectasis of the right middle lobe. The dominant mass  in the right lower lobe has increase now measuring 2.5 cm, previously  2.0 cm. Also noted are multiple low-density liver lesions which are not  as well seen as on the previous contrasted exam but likely reflect  metastatic foci. These appear to have progressed. For example one of the  larger lesions posteriorly in the right hepatic lobe now measures 1.9  cm, previously 1.3 cm. Ovoid soft tissue nodule beneath the left  hemidiaphragm has increased now measuring 2.7 cm, previously 2.0. This  may reflect adenopathy or peritoneal implant.     There are low-density renal lesions which are also not as well seen but  favored to reflect cysts. There is an enlarging right adrenal metastasis  now measuring 4.2 x 2.6 cm, previously 3.2 x 1.9 cm. Enlarged left  adrenal gland appears stable. Remaining solid organs are otherwise  unremarkable without benefit of IV contrast. Encompassed aorta is  non-aneurysmal. Moderate constipation. The GI tract not opacified for  assessment but non obstructive in appearance. The appendix is unable to  be visualized for evaluation on this exam without oral or IV contrast.  There is a small amount of ascites present.      Known left 10th rib fracture laterally does not show any significant  osseous union. There is persistent swelling of the adjacent muscular  tissues which now appear more rounded and mass like and therefore likely  metastatic in nature with pathologic fracture.         Impression:     1.  Progression of metastatic disease.  2. Stable renal lesions, likely cysts.  3. Moderate constipation without  obstruction.  4. Small amount of ascites               dexamethasone 4 mg Intravenous Q8H   famotidine 20 mg Oral BID   levETIRAcetam 500 mg Oral Q12H   oxyCODONE ER 20 mg Oral Q12H   pantoprazole 40 mg Oral Daily   polyethylene glycol 17 g Oral Daily   sennosides-docusate sodium 2 tablet Oral BID      Diet Full Liquid      Assessment/Plan      Active Hospital Problems    Diagnosis Date Noted   • **Cancer associated pain [G89.3] 09/30/2018   • Drug-induced constipation [K59.03] 09/30/2018   • Generalized abdominal pain [R10.84] 09/29/2018   • Secondary cancer of brain (CMS/HCC) [C79.31] 09/11/2018   • Metastasis to bone (CMS/HCC) [C79.51] 09/10/2018   • Atrial flutter with rapid ventricular response (CMS/HCC) [I48.92] 09/10/2018   • Non-small cell lung cancer (NSCLC) (CMS/HCC) [C34.90] 08/31/2018      Resolved Hospital Problems    Diagnosis Date Noted Date Resolved   No resolved problems to display.       Mr. Leo is a 63 y.o. male with a history of Metastatic NSCLCa who has been admitted with Afib with RVR, abdominal pain, constipation    - Abdominal Pain: Constipation present on CT with metastatic cancer. No obstruction seen.  Scheduled miralax and senna/docusate. PRN bisacodyl. Pain related to metastatic disease including liver mets as well.  -Increased IV agents for pain and agitation/restlessness  -Comfort care only  -Stop labs/testing    - Metastatic NSCLC: Continue decadron/keppra for brain metastasis. Oncology following.    - Dent for urinary retention      D/W RN  Reviewed previous records  Greater than 40 minutes spent with greater than 50% counseling and coordinating care    Edmar Drummond MD  [facility]  10/02/18  7:07 PM

## 2018-10-02 NOTE — PLAN OF CARE
Problem: Patient Care Overview  Goal: Plan of Care Review  Outcome: Ongoing (interventions implemented as appropriate)   10/02/18 0625   Coping/Psychosocial   Plan of Care Reviewed With patient   Plan of Care Review   Progress no change   OTHER   Outcome Summary Medicated pt x3 for pain, slept some during night, restless at times, will cont. to monitor and provide comfort care       Problem: Fall Risk (Adult)  Goal: Absence of Fall  Outcome: Ongoing (interventions implemented as appropriate)      Problem: Skin Injury Risk (Adult)  Goal: Skin Health and Integrity  Outcome: Ongoing (interventions implemented as appropriate)      Problem: Pain, Chronic (Adult)  Goal: Acceptable Pain/Comfort Level and Functional Ability  Outcome: Ongoing (interventions implemented as appropriate)      Problem: Palliative Care (Adult)  Goal: Maximized Comfort  Outcome: Ongoing (interventions implemented as appropriate)    Goal: Enhanced Quality of Life  Outcome: Ongoing (interventions implemented as appropriate)

## 2018-10-03 NOTE — PROGRESS NOTES
Case Management Discharge Note    Final Note: The patient  on 10/3/18 @ 04:50. ALIVIA Mishra RN, CCP.     Destination     No service has been selected for the patient.      Durable Medical Equipment     No service has been selected for the patient.      Dialysis/Infusion     No service has been selected for the patient.      Home Medical Care     No service has been selected for the patient.      Social Care     No service has been selected for the patient.             Final Discharge Disposition Code: 20 -

## 2018-10-04 LAB
BACTERIA SPEC AEROBE CULT: NORMAL
BACTERIA SPEC AEROBE CULT: NORMAL

## 2018-10-06 NOTE — DISCHARGE SUMMARY
Name: Beltran Leo  Age: 63 y.o.  Sex: male  :  1955  MRN: [id number]         Primary Care Physician: Provider, No Known      Date of Admission:  2018  Date and Time of Death:  10/3/2018 at 4:50 AM    Principle Cause of Death:   Non-small cell lung cancer    Secondary Diagnoses:   Principal Problem:    Cancer associated pain  Active Problems:    Non-small cell lung cancer (NSCLC) (CMS/HCC)    Atrial flutter with rapid ventricular response (CMS/HCC)    Metastasis to bone (CMS/HCC)    Secondary cancer of brain (CMS/HCC)    Generalized abdominal pain    Drug-induced constipation        Hospital Course  Mr. Leo is a 63 y.o. male with a history of Metastatic Non small cell lung cancer who has been admitted with Afib with RVR, abdominal pain, constipation.     His abdominal pain has been severe. Has metastatic NSCLC. Mets to bone, brain, liver and other areas. Given bowel agents for constipation. He was needing a high amount of narcotics for pain and ativan for restlessness. He appears to be dying and is unable to tolerate any cancer treatment. Discussion between the patient, family, Oncology, and Palliative teams decided to move to the 4P Palliative Care floor for symptom management. He ultimately  on 10/3/18 @04:50AM    Edmar Drummond MD  [facility]  10/06/18  3:26 AM
